# Patient Record
Sex: MALE | Race: BLACK OR AFRICAN AMERICAN | Employment: UNEMPLOYED | URBAN - METROPOLITAN AREA
[De-identification: names, ages, dates, MRNs, and addresses within clinical notes are randomized per-mention and may not be internally consistent; named-entity substitution may affect disease eponyms.]

---

## 2021-01-01 ENCOUNTER — APPOINTMENT (OUTPATIENT)
Dept: GENERAL RADIOLOGY | Age: 66
DRG: 252 | End: 2021-01-01
Attending: INTERNAL MEDICINE
Payer: MEDICARE

## 2021-01-01 ENCOUNTER — ANESTHESIA (OUTPATIENT)
Dept: SURGERY | Age: 66
DRG: 252 | End: 2021-01-01
Payer: MEDICARE

## 2021-01-01 ENCOUNTER — ANESTHESIA EVENT (OUTPATIENT)
Dept: SURGERY | Age: 66
DRG: 252 | End: 2021-01-01
Payer: MEDICARE

## 2021-01-01 ENCOUNTER — APPOINTMENT (OUTPATIENT)
Dept: NON INVASIVE DIAGNOSTICS | Age: 66
DRG: 871 | End: 2021-01-01
Attending: NURSE PRACTITIONER
Payer: MEDICARE

## 2021-01-01 ENCOUNTER — APPOINTMENT (OUTPATIENT)
Dept: CT IMAGING | Age: 66
DRG: 252 | End: 2021-01-01
Attending: EMERGENCY MEDICINE
Payer: MEDICARE

## 2021-01-01 ENCOUNTER — HOSPITAL ENCOUNTER (INPATIENT)
Age: 66
LOS: 5 days | Discharge: NURSING, REHAB OR LONG-TERM CARE | DRG: 252 | End: 2021-11-12
Attending: EMERGENCY MEDICINE | Admitting: INTERNAL MEDICINE
Payer: MEDICARE

## 2021-01-01 ENCOUNTER — APPOINTMENT (OUTPATIENT)
Dept: GENERAL RADIOLOGY | Age: 66
DRG: 252 | End: 2021-01-01
Attending: EMERGENCY MEDICINE
Payer: MEDICARE

## 2021-01-01 ENCOUNTER — APPOINTMENT (OUTPATIENT)
Dept: CT IMAGING | Age: 66
DRG: 871 | End: 2021-01-01
Attending: INTERNAL MEDICINE
Payer: MEDICARE

## 2021-01-01 ENCOUNTER — APPOINTMENT (OUTPATIENT)
Dept: GENERAL RADIOLOGY | Age: 66
DRG: 871 | End: 2021-01-01
Attending: INTERNAL MEDICINE
Payer: MEDICARE

## 2021-01-01 ENCOUNTER — APPOINTMENT (OUTPATIENT)
Dept: NON INVASIVE DIAGNOSTICS | Age: 66
DRG: 252 | End: 2021-01-01
Attending: INTERNAL MEDICINE
Payer: MEDICARE

## 2021-01-01 ENCOUNTER — HOSPITAL ENCOUNTER (INPATIENT)
Age: 66
LOS: 2 days | DRG: 871 | End: 2021-12-12
Attending: STUDENT IN AN ORGANIZED HEALTH CARE EDUCATION/TRAINING PROGRAM | Admitting: INTERNAL MEDICINE
Payer: MEDICARE

## 2021-01-01 ENCOUNTER — APPOINTMENT (OUTPATIENT)
Dept: GENERAL RADIOLOGY | Age: 66
DRG: 252 | End: 2021-01-01
Attending: ANESTHESIOLOGY
Payer: MEDICARE

## 2021-01-01 ENCOUNTER — APPOINTMENT (OUTPATIENT)
Dept: CT IMAGING | Age: 66
DRG: 252 | End: 2021-01-01
Attending: INTERNAL MEDICINE
Payer: MEDICARE

## 2021-01-01 ENCOUNTER — APPOINTMENT (OUTPATIENT)
Dept: GENERAL RADIOLOGY | Age: 66
DRG: 871 | End: 2021-01-01
Attending: EMERGENCY MEDICINE
Payer: MEDICARE

## 2021-01-01 ENCOUNTER — APPOINTMENT (OUTPATIENT)
Dept: VASCULAR SURGERY | Age: 66
DRG: 252 | End: 2021-01-01
Attending: EMERGENCY MEDICINE
Payer: MEDICARE

## 2021-01-01 ENCOUNTER — APPOINTMENT (OUTPATIENT)
Dept: CT IMAGING | Age: 66
DRG: 871 | End: 2021-01-01
Attending: EMERGENCY MEDICINE
Payer: MEDICARE

## 2021-01-01 VITALS
OXYGEN SATURATION: 100 % | HEIGHT: 69 IN | BODY MASS INDEX: 25.18 KG/M2 | RESPIRATION RATE: 16 BRPM | WEIGHT: 170 LBS | HEART RATE: 110 BPM | TEMPERATURE: 98.8 F | SYSTOLIC BLOOD PRESSURE: 139 MMHG | DIASTOLIC BLOOD PRESSURE: 56 MMHG

## 2021-01-01 VITALS
WEIGHT: 170 LBS | SYSTOLIC BLOOD PRESSURE: 39 MMHG | HEART RATE: 65 BPM | HEIGHT: 66 IN | OXYGEN SATURATION: 63 % | DIASTOLIC BLOOD PRESSURE: 17 MMHG | RESPIRATION RATE: 12 BRPM | BODY MASS INDEX: 27.32 KG/M2 | TEMPERATURE: 95.5 F

## 2021-01-01 DIAGNOSIS — A41.9 SEVERE SEPSIS (HCC): Primary | ICD-10-CM

## 2021-01-01 DIAGNOSIS — I21.4 NSTEMI (NON-ST ELEVATED MYOCARDIAL INFARCTION) (HCC): ICD-10-CM

## 2021-01-01 DIAGNOSIS — R65.20 SEVERE SEPSIS (HCC): Primary | ICD-10-CM

## 2021-01-01 DIAGNOSIS — E87.5 ACUTE HYPERKALEMIA: ICD-10-CM

## 2021-01-01 DIAGNOSIS — N28.9 ACUTE RENAL INSUFFICIENCY: ICD-10-CM

## 2021-01-01 DIAGNOSIS — I70.0 AORTIC OCCLUSION (HCC): Primary | ICD-10-CM

## 2021-01-01 LAB
ABO + RH BLD: NORMAL
ALBUMIN SERPL-MCNC: 1.8 G/DL (ref 3.5–5)
ALBUMIN SERPL-MCNC: 1.9 G/DL (ref 3.5–5)
ALBUMIN SERPL-MCNC: 1.9 G/DL (ref 3.5–5)
ALBUMIN SERPL-MCNC: 2.1 G/DL (ref 3.5–5)
ALBUMIN SERPL-MCNC: 2.6 G/DL (ref 3.5–5)
ALBUMIN SERPL-MCNC: 3.1 G/DL (ref 3.5–5)
ALBUMIN/GLOB SERPL: 0.5 {RATIO} (ref 1.1–2.2)
ALBUMIN/GLOB SERPL: 0.6 {RATIO} (ref 1.1–2.2)
ALBUMIN/GLOB SERPL: 0.6 {RATIO} (ref 1.1–2.2)
ALP SERPL-CCNC: 369 U/L (ref 45–117)
ALP SERPL-CCNC: 45 U/L (ref 45–117)
ALP SERPL-CCNC: 47 U/L (ref 45–117)
ALP SERPL-CCNC: 497 U/L (ref 45–117)
ALP SERPL-CCNC: 81 U/L (ref 45–117)
ALT SERPL-CCNC: 10 U/L (ref 12–78)
ALT SERPL-CCNC: 18 U/L (ref 12–78)
ALT SERPL-CCNC: 721 U/L (ref 12–78)
ALT SERPL-CCNC: 8 U/L (ref 12–78)
ALT SERPL-CCNC: 971 U/L (ref 12–78)
AMMONIA PLAS-SCNC: 12 UMOL/L
AMPHET UR QL SCN: NEGATIVE
ANION GAP SERPL CALC-SCNC: 10 MMOL/L (ref 5–15)
ANION GAP SERPL CALC-SCNC: 10 MMOL/L (ref 5–15)
ANION GAP SERPL CALC-SCNC: 20 MMOL/L (ref 5–15)
ANION GAP SERPL CALC-SCNC: 22 MMOL/L (ref 5–15)
ANION GAP SERPL CALC-SCNC: 23 MMOL/L (ref 5–15)
ANION GAP SERPL CALC-SCNC: 24 MMOL/L (ref 5–15)
ANION GAP SERPL CALC-SCNC: 25 MMOL/L (ref 5–15)
ANION GAP SERPL CALC-SCNC: 26 MMOL/L (ref 5–15)
ANION GAP SERPL CALC-SCNC: 8 MMOL/L (ref 5–15)
ANION GAP SERPL CALC-SCNC: 8 MMOL/L (ref 5–15)
ANION GAP SERPL CALC-SCNC: 9 MMOL/L (ref 5–15)
ANION GAP SERPL CALC-SCNC: 9 MMOL/L (ref 5–15)
APPEARANCE UR: ABNORMAL
APTT PPP: 26.2 SEC (ref 22.1–31)
APTT PPP: 32.4 SEC (ref 22.1–31)
APTT PPP: 50.3 SEC (ref 22.1–31)
APTT PPP: 52.5 SEC (ref 21.2–34.1)
APTT PPP: 79.4 SEC (ref 22.1–31)
APTT PPP: >130 SEC (ref 22.1–31)
ARTERIAL PATENCY WRIST A: ABNORMAL
AST SERPL W P-5'-P-CCNC: 1170 U/L (ref 15–37)
AST SERPL W P-5'-P-CCNC: 1542 U/L (ref 15–37)
AST SERPL-CCNC: 37 U/L (ref 15–37)
AST SERPL-CCNC: 39 U/L (ref 15–37)
AST SERPL-CCNC: 41 U/L (ref 15–37)
ATRIAL RATE: 92 BPM
AV R PG: 44.22 MMHG
BACTERIA SPEC CULT: NORMAL
BACTERIA SPEC CULT: NORMAL
BACTERIA URNS QL MICRO: NEGATIVE /HPF
BARBITURATES UR QL SCN: NEGATIVE
BASE DEFICIT BLDA-SCNC: 16.6 MMOL/L (ref 0–2)
BASE DEFICIT BLDA-SCNC: 6.9 MMOL/L (ref 0–2)
BASE DEFICIT BLDA-SCNC: 9.1 MMOL/L (ref 0–2)
BASOPHILS # BLD: 0 K/UL (ref 0–0.1)
BASOPHILS # BLD: 0.1 K/UL (ref 0–0.1)
BASOPHILS # BLD: 0.1 K/UL (ref 0–0.1)
BASOPHILS NFR BLD: 0 % (ref 0–1)
BASOPHILS NFR BLD: 1 % (ref 0–1)
BASOPHILS NFR BLD: 1 % (ref 0–1)
BDY SITE: ABNORMAL
BENZODIAZ UR QL: NEGATIVE
BILIRUB DIRECT SERPL-MCNC: 0.2 MG/DL (ref 0–0.2)
BILIRUB DIRECT SERPL-MCNC: 0.2 MG/DL (ref 0–0.2)
BILIRUB DIRECT SERPL-MCNC: 1.5 MG/DL (ref 0–0.2)
BILIRUB SERPL-MCNC: 0.4 MG/DL (ref 0.2–1)
BILIRUB SERPL-MCNC: 0.5 MG/DL (ref 0.2–1)
BILIRUB SERPL-MCNC: 0.6 MG/DL (ref 0.2–1)
BILIRUB SERPL-MCNC: 2.2 MG/DL (ref 0.2–1)
BILIRUB SERPL-MCNC: 2.4 MG/DL (ref 0.2–1)
BILIRUB UR QL: NEGATIVE
BLOOD GROUP ANTIBODIES SERPL: NORMAL
BNP SERPL-MCNC: 1218 PG/ML
BUN SERPL-MCNC: 10 MG/DL (ref 6–20)
BUN SERPL-MCNC: 100 MG/DL (ref 6–20)
BUN SERPL-MCNC: 107 MG/DL (ref 6–20)
BUN SERPL-MCNC: 11 MG/DL (ref 6–20)
BUN SERPL-MCNC: 111 MG/DL (ref 6–20)
BUN SERPL-MCNC: 7 MG/DL (ref 6–20)
BUN SERPL-MCNC: 8 MG/DL (ref 6–20)
BUN SERPL-MCNC: 9 MG/DL (ref 6–20)
BUN SERPL-MCNC: 9 MG/DL (ref 6–20)
BUN SERPL-MCNC: 90 MG/DL (ref 6–20)
BUN SERPL-MCNC: 92 MG/DL (ref 6–20)
BUN SERPL-MCNC: 93 MG/DL (ref 6–20)
BUN/CREAT SERPL: 12 (ref 12–20)
BUN/CREAT SERPL: 13 (ref 12–20)
BUN/CREAT SERPL: 16 (ref 12–20)
BUN/CREAT SERPL: 25 (ref 12–20)
BUN/CREAT SERPL: 27 (ref 12–20)
BUN/CREAT SERPL: 29 (ref 12–20)
BUN/CREAT SERPL: 31 (ref 12–20)
BUN/CREAT SERPL: 31 (ref 12–20)
BUN/CREAT SERPL: 32 (ref 12–20)
BUN/CREAT SERPL: 9 (ref 12–20)
CA-I BLD-MCNC: 6 MG/DL (ref 8.5–10.1)
CA-I BLD-MCNC: 6.8 MG/DL (ref 8.5–10.1)
CA-I BLD-MCNC: 7 MG/DL (ref 8.5–10.1)
CA-I BLD-MCNC: 7.1 MG/DL (ref 8.5–10.1)
CA-I BLD-MCNC: 8.2 MG/DL (ref 8.5–10.1)
CA-I BLD-MCNC: 8.6 MG/DL (ref 8.5–10.1)
CALCIUM SERPL-MCNC: 10.1 MG/DL (ref 8.5–10.1)
CALCIUM SERPL-MCNC: 8.1 MG/DL (ref 8.5–10.1)
CALCIUM SERPL-MCNC: 8.1 MG/DL (ref 8.5–10.1)
CALCIUM SERPL-MCNC: 8.5 MG/DL (ref 8.5–10.1)
CALCIUM SERPL-MCNC: 9.2 MG/DL (ref 8.5–10.1)
CALCIUM SERPL-MCNC: 9.4 MG/DL (ref 8.5–10.1)
CALCULATED P AXIS, ECG09: 62 DEGREES
CALCULATED R AXIS, ECG10: -82 DEGREES
CALCULATED T AXIS, ECG11: 91 DEGREES
CANNABINOIDS UR QL SCN: POSITIVE
CHLORIDE SERPL-SCNC: 100 MMOL/L (ref 97–108)
CHLORIDE SERPL-SCNC: 92 MMOL/L (ref 97–108)
CHLORIDE SERPL-SCNC: 94 MMOL/L (ref 97–108)
CHLORIDE SERPL-SCNC: 94 MMOL/L (ref 97–108)
CHLORIDE SERPL-SCNC: 95 MMOL/L (ref 97–108)
CHLORIDE SERPL-SCNC: 96 MMOL/L (ref 97–108)
CHLORIDE SERPL-SCNC: 97 MMOL/L (ref 97–108)
CHOLEST SERPL-MCNC: 165 MG/DL
CO2 SERPL-SCNC: 10 MMOL/L (ref 21–32)
CO2 SERPL-SCNC: 10 MMOL/L (ref 21–32)
CO2 SERPL-SCNC: 12 MMOL/L (ref 21–32)
CO2 SERPL-SCNC: 14 MMOL/L (ref 21–32)
CO2 SERPL-SCNC: 15 MMOL/L (ref 21–32)
CO2 SERPL-SCNC: 16 MMOL/L (ref 21–32)
CO2 SERPL-SCNC: 23 MMOL/L (ref 21–32)
CO2 SERPL-SCNC: 24 MMOL/L (ref 21–32)
CO2 SERPL-SCNC: 26 MMOL/L (ref 21–32)
CO2 SERPL-SCNC: 28 MMOL/L (ref 21–32)
COCAINE UR QL SCN: POSITIVE
COLLECT DATE STL: 2
COLOR UR: ABNORMAL
COVID-19 RAPID TEST, COVR: NOT DETECTED
CREAT SERPL-MCNC: 0.57 MG/DL (ref 0.7–1.3)
CREAT SERPL-MCNC: 0.66 MG/DL (ref 0.7–1.3)
CREAT SERPL-MCNC: 0.76 MG/DL (ref 0.7–1.3)
CREAT SERPL-MCNC: 0.8 MG/DL (ref 0.7–1.3)
CREAT SERPL-MCNC: 0.81 MG/DL (ref 0.7–1.3)
CREAT SERPL-MCNC: 0.95 MG/DL (ref 0.7–1.3)
CREAT SERPL-MCNC: 2.91 MG/DL (ref 0.7–1.3)
CREAT SERPL-MCNC: 2.94 MG/DL (ref 0.7–1.3)
CREAT SERPL-MCNC: 2.98 MG/DL (ref 0.7–1.3)
CREAT SERPL-MCNC: 3.43 MG/DL (ref 0.7–1.3)
CREAT SERPL-MCNC: 3.93 MG/DL (ref 0.7–1.3)
CREAT SERPL-MCNC: 4.47 MG/DL (ref 0.7–1.3)
DIAGNOSIS, 93000: NORMAL
DIFFERENTIAL METHOD BLD: ABNORMAL
DRUG SCRN COMMENT,DRGCM: ABNORMAL
ECHO AO ASC DIAM: 2.68 CM
ECHO AO ROOT DIAM: 2.9 CM
ECHO AR MAX VEL PISA: 332.5 CM/S
ECHO AR MAX VEL PISA: 374 CM/S
ECHO AV AREA PEAK VELOCITY: 3.16 CM2
ECHO AV AREA VTI: 3.29 CM2
ECHO AV AREA/BSA PEAK VELOCITY: 1.6 CM2/M2
ECHO AV AREA/BSA VTI: 1.7 CM2/M2
ECHO AV MEAN GRADIENT: 2.59 MMHG
ECHO AV PEAK GRADIENT: 4.71 MMHG
ECHO AV PEAK VELOCITY: 108.43 CM/S
ECHO AV REGURGITANT PHT: 279.1 MS
ECHO AV REGURGITANT PHT: 361 MS
ECHO AV VTI: 14.48 CM
ECHO EST RA PRESSURE: 8 MMHG
ECHO LA MAJOR AXIS: 3.74 CM
ECHO LA MAJOR AXIS: 4.5 CM
ECHO LA MAJOR AXIS: 4.5 CM
ECHO LA MINOR AXIS: 1.94 CM
ECHO LV E' LATERAL VELOCITY: 3.05 CM/S
ECHO LV E' SEPTAL VELOCITY: 9.86 CM/S
ECHO LV EDV A2C: 187 CM3
ECHO LV ESV A2C: 133 CM3
ECHO LV INTERNAL DIMENSION DIASTOLIC: 5.72 CM (ref 4.2–5.9)
ECHO LV INTERNAL DIMENSION DIASTOLIC: 6.24 CM (ref 4.2–5.9)
ECHO LV INTERNAL DIMENSION SYSTOLIC: 5.1 CM
ECHO LV INTERNAL DIMENSION SYSTOLIC: 5.46 CM
ECHO LV IVSD: 0.89 CM (ref 0.6–1)
ECHO LV IVSD: 1.28 CM (ref 0.6–1)
ECHO LV MASS 2D: 235.7 G (ref 88–224)
ECHO LV MASS 2D: 305.3 G (ref 88–224)
ECHO LV MASS INDEX 2D: 122.1 G/M2 (ref 49–115)
ECHO LV MASS INDEX 2D: 163.3 G/M2 (ref 49–115)
ECHO LV POSTERIOR WALL DIASTOLIC: 0.94 CM (ref 0.6–1)
ECHO LV POSTERIOR WALL DIASTOLIC: 1.21 CM (ref 0.6–1)
ECHO LVOT DIAM: 1.96 CM
ECHO LVOT PEAK GRADIENT: 5.11 MMHG
ECHO LVOT PEAK VELOCITY: 112.98 CM/S
ECHO LVOT SV: 47.6 ML
ECHO LVOT VTI: 15.72 CM
ECHO MV A VELOCITY: 97.08 CM/S
ECHO MV AREA PHT: 14.37 CM2
ECHO MV AREA VTI: 4.56 CM2
ECHO MV E DECELERATION TIME (DT): 74.88 MS
ECHO MV E VELOCITY: 41.11 CM/S
ECHO MV E/A RATIO: 0.42
ECHO MV E/E' LATERAL: 13.48
ECHO MV E/E' RATIO (AVERAGED): 8.82
ECHO MV E/E' SEPTAL: 4.17
ECHO MV MAX VELOCITY: 82.72 CM/S
ECHO MV MEAN GRADIENT: 1.1 MMHG
ECHO MV PEAK GRADIENT: 2.74 MMHG
ECHO MV PRESSURE HALF TIME (PHT): 15.31 MS
ECHO MV REGURGITANT VTIA: 151.61 CM
ECHO MV VTI: 10.44 CM
ECHO RA AREA 4C: 22.21 CM2
ECHO RIGHT VENTRICULAR SYSTOLIC PRESSURE (RVSP): 53 MMHG
ECHO RV INTERNAL DIMENSION: 4.26 CM
ECHO TV MAX VELOCITY: 334 CM/S
ECHO TV MEAN GRADIENT: 65.5 MMHG
ECHO TV REGURGITANT MAX VELOCITY: 141.26 CM/S
ECHO TV REGURGITANT MAX VELOCITY: 494.34 CM/S
ECHO TV REGURGITANT MAX VELOCITY: 510.85 CM/S
ECHO TV REGURGITANT PEAK GRADIENT: 45 MMHG
ECHO TV REGURGITANT PEAK GRADIENT: 7.98 MMHG
EOSINOPHIL # BLD: 0 K/UL (ref 0–0.4)
EOSINOPHIL # BLD: 0.1 K/UL (ref 0–0.4)
EOSINOPHIL NFR BLD: 0 % (ref 0–7)
EOSINOPHIL NFR BLD: 1 % (ref 0–7)
EOSINOPHIL NFR BLD: 2 % (ref 0–7)
EPAP/CPAP/PEEP, PAPEEP: 0
EPAP/CPAP/PEEP, PAPEEP: 6
ERYTHROCYTE [DISTWIDTH] IN BLOOD BY AUTOMATED COUNT: 12.9 % (ref 11.5–14.5)
ERYTHROCYTE [DISTWIDTH] IN BLOOD BY AUTOMATED COUNT: 13 % (ref 11.5–14.5)
ERYTHROCYTE [DISTWIDTH] IN BLOOD BY AUTOMATED COUNT: 13.1 % (ref 11.5–14.5)
ERYTHROCYTE [DISTWIDTH] IN BLOOD BY AUTOMATED COUNT: 13.2 % (ref 11.5–14.5)
ERYTHROCYTE [DISTWIDTH] IN BLOOD BY AUTOMATED COUNT: 15.7 % (ref 11.5–14.5)
ERYTHROCYTE [DISTWIDTH] IN BLOOD BY AUTOMATED COUNT: 15.9 % (ref 11.5–14.5)
ERYTHROCYTE [DISTWIDTH] IN BLOOD BY AUTOMATED COUNT: 16 % (ref 11.5–14.5)
EST. AVERAGE GLUCOSE BLD GHB EST-MCNC: 157 MG/DL
FIO2 ON VENT: 100 %
FIO2 ON VENT: 100 %
GAS FLOW.O2 O2 DELIVERY SYS: 15 L/MIN
GAS FLOW.O2 O2 DELIVERY SYS: 6 L/MIN
GAS FLOW.O2 SETTING OXYMISER: 14 L/MIN
GLOBULIN SER CALC-MCNC: 3.5 G/DL (ref 2–4)
GLOBULIN SER CALC-MCNC: 3.7 G/DL (ref 2–4)
GLOBULIN SER CALC-MCNC: 3.8 G/DL (ref 2–4)
GLOBULIN SER CALC-MCNC: 4.6 G/DL (ref 2–4)
GLOBULIN SER CALC-MCNC: 5.8 G/DL (ref 2–4)
GLUCOSE BLD STRIP.AUTO-MCNC: 108 MG/DL (ref 65–117)
GLUCOSE BLD STRIP.AUTO-MCNC: 126 MG/DL (ref 65–117)
GLUCOSE BLD STRIP.AUTO-MCNC: 138 MG/DL (ref 65–117)
GLUCOSE BLD STRIP.AUTO-MCNC: 149 MG/DL (ref 65–117)
GLUCOSE BLD STRIP.AUTO-MCNC: 154 MG/DL (ref 65–117)
GLUCOSE BLD STRIP.AUTO-MCNC: 158 MG/DL (ref 65–117)
GLUCOSE BLD STRIP.AUTO-MCNC: 166 MG/DL (ref 65–117)
GLUCOSE BLD STRIP.AUTO-MCNC: 170 MG/DL (ref 65–117)
GLUCOSE BLD STRIP.AUTO-MCNC: 176 MG/DL (ref 65–117)
GLUCOSE BLD STRIP.AUTO-MCNC: 177 MG/DL (ref 65–117)
GLUCOSE BLD STRIP.AUTO-MCNC: 181 MG/DL (ref 65–117)
GLUCOSE BLD STRIP.AUTO-MCNC: 182 MG/DL (ref 65–117)
GLUCOSE BLD STRIP.AUTO-MCNC: 188 MG/DL (ref 65–117)
GLUCOSE BLD STRIP.AUTO-MCNC: 189 MG/DL (ref 65–117)
GLUCOSE BLD STRIP.AUTO-MCNC: 191 MG/DL (ref 65–117)
GLUCOSE BLD STRIP.AUTO-MCNC: 194 MG/DL (ref 65–117)
GLUCOSE BLD STRIP.AUTO-MCNC: 197 MG/DL (ref 65–117)
GLUCOSE BLD STRIP.AUTO-MCNC: 211 MG/DL (ref 65–117)
GLUCOSE BLD STRIP.AUTO-MCNC: 213 MG/DL (ref 65–117)
GLUCOSE BLD STRIP.AUTO-MCNC: 266 MG/DL (ref 65–117)
GLUCOSE BLD STRIP.AUTO-MCNC: 290 MG/DL (ref 65–117)
GLUCOSE BLD STRIP.AUTO-MCNC: 292 MG/DL (ref 65–117)
GLUCOSE BLD STRIP.AUTO-MCNC: 80 MG/DL (ref 65–117)
GLUCOSE BLD STRIP.AUTO-MCNC: 88 MG/DL (ref 65–117)
GLUCOSE BLD STRIP.AUTO-MCNC: 93 MG/DL (ref 65–117)
GLUCOSE SERPL-MCNC: 107 MG/DL (ref 65–100)
GLUCOSE SERPL-MCNC: 122 MG/DL (ref 65–100)
GLUCOSE SERPL-MCNC: 144 MG/DL (ref 65–100)
GLUCOSE SERPL-MCNC: 145 MG/DL (ref 65–100)
GLUCOSE SERPL-MCNC: 146 MG/DL (ref 65–100)
GLUCOSE SERPL-MCNC: 148 MG/DL (ref 65–100)
GLUCOSE SERPL-MCNC: 150 MG/DL (ref 65–100)
GLUCOSE SERPL-MCNC: 164 MG/DL (ref 65–100)
GLUCOSE SERPL-MCNC: 173 MG/DL (ref 65–100)
GLUCOSE SERPL-MCNC: 245 MG/DL (ref 65–100)
GLUCOSE SERPL-MCNC: 298 MG/DL (ref 65–100)
GLUCOSE SERPL-MCNC: 299 MG/DL (ref 65–100)
GLUCOSE UR STRIP.AUTO-MCNC: 50 MG/DL
HBA1C MFR BLD: 7.1 % (ref 4–5.6)
HCO3 BLDA-SCNC: 12 MMOL/L (ref 22–26)
HCO3 BLDA-SCNC: 17 MMOL/L (ref 22–26)
HCO3 BLDA-SCNC: 17 MMOL/L (ref 22–26)
HCT VFR BLD AUTO: 24 % (ref 36.6–50.3)
HCT VFR BLD AUTO: 24.5 % (ref 36.6–50.3)
HCT VFR BLD AUTO: 25.6 % (ref 36.6–50.3)
HCT VFR BLD AUTO: 26.5 % (ref 36.6–50.3)
HCT VFR BLD AUTO: 26.5 % (ref 36.6–50.3)
HCT VFR BLD AUTO: 29.3 % (ref 36.6–50.3)
HCT VFR BLD AUTO: 33.2 % (ref 36.6–50.3)
HCT VFR BLD AUTO: 38.8 % (ref 36.6–50.3)
HCT VFR BLD AUTO: 39.5 % (ref 36.6–50.3)
HCT VFR BLD AUTO: 42.4 % (ref 36.6–50.3)
HDLC SERPL-MCNC: 37 MG/DL
HDLC SERPL: 4.5 {RATIO} (ref 0–5)
HEMOCCULT SP1 STL QL: POSITIVE
HGB BLD-MCNC: 10.6 G/DL (ref 12.1–17)
HGB BLD-MCNC: 13.1 G/DL (ref 12.1–17)
HGB BLD-MCNC: 13.1 G/DL (ref 12.1–17)
HGB BLD-MCNC: 14.3 G/DL (ref 12.1–17)
HGB BLD-MCNC: 8.2 G/DL (ref 12.1–17)
HGB BLD-MCNC: 8.7 G/DL (ref 12.1–17)
HGB BLD-MCNC: 8.7 G/DL (ref 12.1–17)
HGB BLD-MCNC: 9.4 G/DL (ref 12.1–17)
HGB UR QL STRIP: ABNORMAL
HYALINE CASTS URNS QL MICRO: ABNORMAL /LPF (ref 0–5)
IMM GRANULOCYTES # BLD AUTO: 0 K/UL
IMM GRANULOCYTES # BLD AUTO: 0 K/UL (ref 0–0.04)
IMM GRANULOCYTES # BLD AUTO: 0 K/UL (ref 0–0.04)
IMM GRANULOCYTES # BLD AUTO: 0.1 K/UL (ref 0–0.04)
IMM GRANULOCYTES # BLD AUTO: 0.1 K/UL (ref 0–0.04)
IMM GRANULOCYTES NFR BLD AUTO: 0 %
IMM GRANULOCYTES NFR BLD AUTO: 0 % (ref 0–0.5)
IMM GRANULOCYTES NFR BLD AUTO: 1 % (ref 0–0.5)
IPAP/PIP, IPAPIP: 0
KETONES UR QL STRIP.AUTO: 5 MG/DL
LACTATE SERPL-SCNC: 11.6 MMOL/L (ref 0.4–2)
LACTATE SERPL-SCNC: 12.3 MMOL/L (ref 0.4–2)
LACTATE SERPL-SCNC: 12.5 MMOL/L (ref 0.4–2)
LDLC SERPL CALC-MCNC: 110.8 MG/DL (ref 0–100)
LEFT ARM BP: 140 MMHG
LEUKOCYTE ESTERASE UR QL STRIP.AUTO: NEGATIVE
LVOT MG: 2.14 MMHG
LYMPHOCYTES # BLD: 1.2 K/UL (ref 0.8–3.5)
LYMPHOCYTES # BLD: 1.4 K/UL (ref 0.8–3.5)
LYMPHOCYTES # BLD: 1.6 K/UL (ref 0.8–3.5)
LYMPHOCYTES # BLD: 1.8 K/UL (ref 0.8–3.5)
LYMPHOCYTES # BLD: 2.3 K/UL (ref 0.8–3.5)
LYMPHOCYTES # BLD: 2.5 K/UL (ref 0.8–3.5)
LYMPHOCYTES # BLD: 2.7 K/UL (ref 0.8–3.5)
LYMPHOCYTES NFR BLD: 11 % (ref 12–49)
LYMPHOCYTES NFR BLD: 11 % (ref 12–49)
LYMPHOCYTES NFR BLD: 12 % (ref 12–49)
LYMPHOCYTES NFR BLD: 13 % (ref 12–49)
LYMPHOCYTES NFR BLD: 20 % (ref 12–49)
LYMPHOCYTES NFR BLD: 33 % (ref 12–49)
LYMPHOCYTES NFR BLD: 7 % (ref 12–49)
MAGNESIUM SERPL-MCNC: 1.2 MG/DL (ref 1.6–2.4)
MAGNESIUM SERPL-MCNC: 1.3 MG/DL (ref 1.6–2.4)
MAGNESIUM SERPL-MCNC: 2.3 MG/DL (ref 1.6–2.4)
MAGNESIUM SERPL-MCNC: 2.3 MG/DL (ref 1.6–2.4)
MCH RBC QN AUTO: 28.7 PG (ref 26–34)
MCH RBC QN AUTO: 29.2 PG (ref 26–34)
MCH RBC QN AUTO: 29.2 PG (ref 26–34)
MCH RBC QN AUTO: 29.3 PG (ref 26–34)
MCH RBC QN AUTO: 29.4 PG (ref 26–34)
MCH RBC QN AUTO: 29.5 PG (ref 26–34)
MCH RBC QN AUTO: 29.5 PG (ref 26–34)
MCH RBC QN AUTO: 29.7 PG (ref 26–34)
MCH RBC QN AUTO: 29.8 PG (ref 26–34)
MCHC RBC AUTO-ENTMCNC: 31.9 G/DL (ref 30–36.5)
MCHC RBC AUTO-ENTMCNC: 32 G/DL (ref 30–36.5)
MCHC RBC AUTO-ENTMCNC: 32.1 G/DL (ref 30–36.5)
MCHC RBC AUTO-ENTMCNC: 32.8 G/DL (ref 30–36.5)
MCHC RBC AUTO-ENTMCNC: 33.2 G/DL (ref 30–36.5)
MCHC RBC AUTO-ENTMCNC: 33.5 G/DL (ref 30–36.5)
MCHC RBC AUTO-ENTMCNC: 33.7 G/DL (ref 30–36.5)
MCHC RBC AUTO-ENTMCNC: 33.8 G/DL (ref 30–36.5)
MCHC RBC AUTO-ENTMCNC: 34.2 G/DL (ref 30–36.5)
MCV RBC AUTO: 87.3 FL (ref 80–99)
MCV RBC AUTO: 87.4 FL (ref 80–99)
MCV RBC AUTO: 87.5 FL (ref 80–99)
MCV RBC AUTO: 87.6 FL (ref 80–99)
MCV RBC AUTO: 88.2 FL (ref 80–99)
MCV RBC AUTO: 89.6 FL (ref 80–99)
MCV RBC AUTO: 90.4 FL (ref 80–99)
MCV RBC AUTO: 91.1 FL (ref 80–99)
MCV RBC AUTO: 92 FL (ref 80–99)
METAMYELOCYTES NFR BLD MANUAL: 2 %
METHADONE UR QL: NEGATIVE
MONOCYTES # BLD: 0.4 K/UL (ref 0–1)
MONOCYTES # BLD: 0.6 K/UL (ref 0–1)
MONOCYTES # BLD: 1.1 K/UL (ref 0–1)
MONOCYTES # BLD: 1.4 K/UL (ref 0–1)
MONOCYTES # BLD: 1.4 K/UL (ref 0–1)
MONOCYTES # BLD: 1.5 K/UL (ref 0–1)
MONOCYTES # BLD: 1.6 K/UL (ref 0–1)
MONOCYTES NFR BLD: 10 % (ref 5–13)
MONOCYTES NFR BLD: 14 % (ref 5–13)
MONOCYTES NFR BLD: 14 % (ref 5–13)
MONOCYTES NFR BLD: 2 % (ref 5–13)
MONOCYTES NFR BLD: 6 % (ref 5–13)
MONOCYTES NFR BLD: 7 % (ref 5–13)
MONOCYTES NFR BLD: 9 % (ref 5–13)
MRSA DNA SPEC QL NAA+PROBE: NOT DETECTED
MUCOUS THREADS URNS QL MICRO: ABNORMAL /LPF
MYELOCYTES NFR BLD MANUAL: 1 %
NEUTS SEG # BLD: 12.2 K/UL (ref 1.8–8)
NEUTS SEG # BLD: 18.6 K/UL (ref 1.8–8)
NEUTS SEG # BLD: 18.8 K/UL (ref 1.8–8)
NEUTS SEG # BLD: 19.2 K/UL (ref 1.8–8)
NEUTS SEG # BLD: 3.9 K/UL (ref 1.8–8)
NEUTS SEG # BLD: 4.4 K/UL (ref 1.8–8)
NEUTS SEG # BLD: 7.7 K/UL (ref 1.8–8)
NEUTS SEG NFR BLD: 50 % (ref 32–75)
NEUTS SEG NFR BLD: 68 % (ref 32–75)
NEUTS SEG NFR BLD: 72 % (ref 32–75)
NEUTS SEG NFR BLD: 78 % (ref 32–75)
NEUTS SEG NFR BLD: 82 % (ref 32–75)
NEUTS SEG NFR BLD: 84 % (ref 32–75)
NEUTS SEG NFR BLD: 86 % (ref 32–75)
NITRITE UR QL STRIP.AUTO: NEGATIVE
NRBC # BLD: 0 K/UL (ref 0–0.01)
NRBC # BLD: 0.3 K/UL (ref 0–0.01)
NRBC # BLD: 0.31 K/UL (ref 0–0.01)
NRBC # BLD: 0.34 K/UL (ref 0–0.01)
NRBC # BLD: 0.64 K/UL
NRBC BLD MANUAL-RTO: 3 PER 100 WBC
NRBC BLD-RTO: 0 PER 100 WBC
NRBC BLD-RTO: 1.3 PER 100 WBC
NRBC BLD-RTO: 1.4 PER 100 WBC
NRBC BLD-RTO: 1.6 PER 100 WBC
OPIATES UR QL: NEGATIVE
P-R INTERVAL, ECG05: 164 MS
PCO2 BLDA: 11 MMHG (ref 35–45)
PCO2 BLDA: 17 MMHG (ref 35–45)
PCO2 BLDA: 27 MMHG (ref 35–45)
PCP UR QL: NEGATIVE
PERFORMED BY, TECHID: ABNORMAL
PERFORMED BY, TECHID: NORMAL
PH BLDA: 7.38 [PH] (ref 7.35–7.45)
PH BLDA: 7.41 [PH] (ref 7.35–7.45)
PH BLDA: 7.49 [PH] (ref 7.35–7.45)
PH UR STRIP: 5 [PH] (ref 5–8)
PHOSPHATE SERPL-MCNC: 1.9 MG/DL (ref 2.6–4.7)
PHOSPHATE SERPL-MCNC: 2.1 MG/DL (ref 2.6–4.7)
PHOSPHATE SERPL-MCNC: 5.9 MG/DL (ref 2.6–4.7)
PLATELET # BLD AUTO: 113 K/UL (ref 150–400)
PLATELET # BLD AUTO: 113 K/UL (ref 150–400)
PLATELET # BLD AUTO: 121 K/UL (ref 150–400)
PLATELET # BLD AUTO: 163 K/UL (ref 150–400)
PLATELET # BLD AUTO: 166 K/UL (ref 150–400)
PLATELET # BLD AUTO: 195 K/UL (ref 150–400)
PLATELET # BLD AUTO: 226 K/UL (ref 150–400)
PLATELET # BLD AUTO: 235 K/UL (ref 150–400)
PLATELET # BLD AUTO: 237 K/UL (ref 150–400)
PLATELET COMMENTS,PCOM: ABNORMAL
PMV BLD AUTO: 10.6 FL (ref 8.9–12.9)
PMV BLD AUTO: 10.8 FL (ref 8.9–12.9)
PMV BLD AUTO: 11 FL (ref 8.9–12.9)
PMV BLD AUTO: 11 FL (ref 8.9–12.9)
PMV BLD AUTO: 11.2 FL (ref 8.9–12.9)
PMV BLD AUTO: 11.3 FL (ref 8.9–12.9)
PMV BLD AUTO: 13 FL (ref 8.9–12.9)
PO2 BLDA: 138 MMHG (ref 75–100)
PO2 BLDA: 214 MMHG (ref 75–100)
PO2 BLDA: 488 MMHG (ref 75–100)
POTASSIUM SERPL-SCNC: 3.3 MMOL/L (ref 3.5–5.1)
POTASSIUM SERPL-SCNC: 3.5 MMOL/L (ref 3.5–5.1)
POTASSIUM SERPL-SCNC: 3.8 MMOL/L (ref 3.5–5.1)
POTASSIUM SERPL-SCNC: 3.8 MMOL/L (ref 3.5–5.1)
POTASSIUM SERPL-SCNC: 4.2 MMOL/L (ref 3.5–5.1)
POTASSIUM SERPL-SCNC: 4.4 MMOL/L (ref 3.5–5.1)
POTASSIUM SERPL-SCNC: 5.7 MMOL/L (ref 3.5–5.1)
POTASSIUM SERPL-SCNC: 6.1 MMOL/L (ref 3.5–5.1)
POTASSIUM SERPL-SCNC: 6.4 MMOL/L (ref 3.5–5.1)
POTASSIUM SERPL-SCNC: 6.4 MMOL/L (ref 3.5–5.1)
POTASSIUM SERPL-SCNC: 6.6 MMOL/L (ref 3.5–5.1)
POTASSIUM SERPL-SCNC: 6.6 MMOL/L (ref 3.5–5.1)
PROCALCITONIN SERPL-MCNC: 4.55 NG/ML
PROCALCITONIN SERPL-MCNC: <0.05 NG/ML
PROT SERPL-MCNC: 5.3 G/DL (ref 6.4–8.2)
PROT SERPL-MCNC: 5.6 G/DL (ref 6.4–8.2)
PROT SERPL-MCNC: 5.9 G/DL (ref 6.4–8.2)
PROT SERPL-MCNC: 7.2 G/DL (ref 6.4–8.2)
PROT SERPL-MCNC: 8.9 G/DL (ref 6.4–8.2)
PROT UR STRIP-MCNC: >300 MG/DL
Q-T INTERVAL, ECG07: 442 MS
QRS DURATION, ECG06: 154 MS
QTC CALCULATION (BEZET), ECG08: 546 MS
RBC # BLD AUTO: 2.75 M/UL (ref 4.1–5.7)
RBC # BLD AUTO: 2.8 M/UL (ref 4.1–5.7)
RBC # BLD AUTO: 2.81 M/UL (ref 4.1–5.7)
RBC # BLD AUTO: 2.93 M/UL (ref 4.1–5.7)
RBC # BLD AUTO: 3.27 M/UL (ref 4.1–5.7)
RBC # BLD AUTO: 3.61 M/UL (ref 4.1–5.7)
RBC # BLD AUTO: 4.44 M/UL (ref 4.1–5.7)
RBC # BLD AUTO: 4.48 M/UL (ref 4.1–5.7)
RBC # BLD AUTO: 4.84 M/UL (ref 4.1–5.7)
RBC #/AREA URNS HPF: ABNORMAL /HPF (ref 0–5)
RBC MORPH BLD: ABNORMAL
RIGHT ABI: 0.24
RIGHT ARM BP: 143 MMHG
RIGHT POSTERIOR TIBIAL: 34 MMHG
RIGHT TBI: 0
RIGHT TOE PRESSURE: 0 MMHG
SAO2 % BLD: 101 %
SAO2 % BLD: 101 %
SAO2 % BLD: >101 %
SAO2% DEVICE SAO2% SENSOR NAME: ABNORMAL
SAO2% DEVICE SAO2% SENSOR NAME: ABNORMAL
SERVICE CMNT-IMP: ABNORMAL
SERVICE CMNT-IMP: NORMAL
SODIUM SERPL-SCNC: 130 MMOL/L (ref 136–145)
SODIUM SERPL-SCNC: 131 MMOL/L (ref 136–145)
SODIUM SERPL-SCNC: 131 MMOL/L (ref 136–145)
SODIUM SERPL-SCNC: 132 MMOL/L (ref 136–145)
SODIUM SERPL-SCNC: 134 MMOL/L (ref 136–145)
SODIUM SERPL-SCNC: 134 MMOL/L (ref 136–145)
SOURCE, COVRS: NORMAL
SOURCE, COVRS: NORMAL
SP GR UR REFRACTOMETRY: 1.02 (ref 1–1.03)
SPECIAL REQUESTS,SREQ: NORMAL
SPECIMEN EXP DATE BLD: NORMAL
SPECIMEN SOURCE: NORMAL
THERAPEUTIC RANGE,PTTT: ABNORMAL SEC (ref 82–109)
THERAPEUTIC RANGE,PTTT: ABNORMAL SECS (ref 58–77)
THERAPEUTIC RANGE,PTTT: NORMAL SECS (ref 58–77)
TRIGL SERPL-MCNC: 86 MG/DL (ref ?–150)
TROPONIN-HIGH SENSITIVITY: 1040 NG/L (ref 0–76)
TROPONIN-HIGH SENSITIVITY: 1591 NG/L (ref 0–76)
TROPONIN-HIGH SENSITIVITY: 214 NG/L (ref 0–76)
TROPONIN-HIGH SENSITIVITY: 301 NG/L (ref 0–76)
TROPONIN-HIGH SENSITIVITY: 4093 NG/L (ref 0–76)
UA: UC IF INDICATED,UAUC: ABNORMAL
UROBILINOGEN UR QL STRIP.AUTO: 2 EU/DL (ref 0.1–1)
VANCOMYCIN SERPL-MCNC: 15.7 UG/ML
VAS RIGHT DORSALIS PEDIS BP: 0 MMHG
VENTILATION MODE VENT: ABNORMAL
VENTRICULAR RATE, ECG03: 92 BPM
VLDLC SERPL CALC-MCNC: 17.2 MG/DL
VT SETTING VENT: 420 ML
WBC # BLD AUTO: 10.3 K/UL (ref 4.1–11.1)
WBC # BLD AUTO: 10.6 K/UL (ref 4.1–11.1)
WBC # BLD AUTO: 15.6 K/UL (ref 4.1–11.1)
WBC # BLD AUTO: 21.3 K/UL (ref 4.1–11.1)
WBC # BLD AUTO: 22.4 K/UL (ref 4.1–11.1)
WBC # BLD AUTO: 22.9 K/UL (ref 4.1–11.1)
WBC # BLD AUTO: 6.3 K/UL (ref 4.1–11.1)
WBC # BLD AUTO: 7.1 K/UL (ref 4.1–11.1)
WBC # BLD AUTO: 7.7 K/UL (ref 4.1–11.1)
WBC URNS QL MICRO: ABNORMAL /HPF (ref 0–4)

## 2021-01-01 PROCEDURE — 36415 COLL VENOUS BLD VENIPUNCTURE: CPT

## 2021-01-01 PROCEDURE — 51798 US URINE CAPACITY MEASURE: CPT

## 2021-01-01 PROCEDURE — 74011000250 HC RX REV CODE- 250: Performed by: INTERNAL MEDICINE

## 2021-01-01 PROCEDURE — 74011250636 HC RX REV CODE- 250/636: Performed by: STUDENT IN AN ORGANIZED HEALTH CARE EDUCATION/TRAINING PROGRAM

## 2021-01-01 PROCEDURE — 65270000029 HC RM PRIVATE

## 2021-01-01 PROCEDURE — 77030018673: Performed by: SURGERY

## 2021-01-01 PROCEDURE — 80076 HEPATIC FUNCTION PANEL: CPT

## 2021-01-01 PROCEDURE — 74011250637 HC RX REV CODE- 250/637: Performed by: INTERNAL MEDICINE

## 2021-01-01 PROCEDURE — 82962 GLUCOSE BLOOD TEST: CPT

## 2021-01-01 PROCEDURE — 73630 X-RAY EXAM OF FOOT: CPT

## 2021-01-01 PROCEDURE — 83036 HEMOGLOBIN GLYCOSYLATED A1C: CPT

## 2021-01-01 PROCEDURE — 71045 X-RAY EXAM CHEST 1 VIEW: CPT

## 2021-01-01 PROCEDURE — 83735 ASSAY OF MAGNESIUM: CPT

## 2021-01-01 PROCEDURE — 74011000250 HC RX REV CODE- 250: Performed by: SURGERY

## 2021-01-01 PROCEDURE — 85025 COMPLETE CBC W/AUTO DIFF WBC: CPT

## 2021-01-01 PROCEDURE — 74011250636 HC RX REV CODE- 250/636: Performed by: NURSE ANESTHETIST, CERTIFIED REGISTERED

## 2021-01-01 PROCEDURE — 2709999900 HC NON-CHARGEABLE SUPPLY

## 2021-01-01 PROCEDURE — 74011250636 HC RX REV CODE- 250/636: Performed by: SURGERY

## 2021-01-01 PROCEDURE — 75635 CT ANGIO ABDOMINAL ARTERIES: CPT

## 2021-01-01 PROCEDURE — 76010000134 HC OR TIME 3.5 TO 4 HR: Performed by: SURGERY

## 2021-01-01 PROCEDURE — 77030013798 HC KT TRNSDUC PRSSR EDWD -B: Performed by: ANESTHESIOLOGY

## 2021-01-01 PROCEDURE — 04UK0JZ SUPPLEMENT RIGHT FEMORAL ARTERY WITH SYNTHETIC SUBSTITUTE, OPEN APPROACH: ICD-10-PCS | Performed by: SURGERY

## 2021-01-01 PROCEDURE — 74011000636 HC RX REV CODE- 636: Performed by: EMERGENCY MEDICINE

## 2021-01-01 PROCEDURE — 74011250637 HC RX REV CODE- 250/637: Performed by: STUDENT IN AN ORGANIZED HEALTH CARE EDUCATION/TRAINING PROGRAM

## 2021-01-01 PROCEDURE — 77030008463 HC STPLR SKN PROX J&J -B: Performed by: SURGERY

## 2021-01-01 PROCEDURE — 74011250636 HC RX REV CODE- 250/636: Performed by: INTERNAL MEDICINE

## 2021-01-01 PROCEDURE — 80061 LIPID PANEL: CPT

## 2021-01-01 PROCEDURE — 97535 SELF CARE MNGMENT TRAINING: CPT

## 2021-01-01 PROCEDURE — 87641 MR-STAPH DNA AMP PROBE: CPT

## 2021-01-01 PROCEDURE — C1757 CATH, THROMBECTOMY/EMBOLECT: HCPCS | Performed by: SURGERY

## 2021-01-01 PROCEDURE — 36600 WITHDRAWAL OF ARTERIAL BLOOD: CPT

## 2021-01-01 PROCEDURE — 65620000000 HC RM CCU GENERAL

## 2021-01-01 PROCEDURE — 94762 N-INVAS EAR/PLS OXIMTRY CONT: CPT

## 2021-01-01 PROCEDURE — 80048 BASIC METABOLIC PNL TOTAL CA: CPT

## 2021-01-01 PROCEDURE — 81001 URINALYSIS AUTO W/SCOPE: CPT

## 2021-01-01 PROCEDURE — 74011636637 HC RX REV CODE- 636/637: Performed by: SURGERY

## 2021-01-01 PROCEDURE — 74011636637 HC RX REV CODE- 636/637: Performed by: INTERNAL MEDICINE

## 2021-01-01 PROCEDURE — 76210000017 HC OR PH I REC 1.5 TO 2 HR: Performed by: SURGERY

## 2021-01-01 PROCEDURE — 77010033678 HC OXYGEN DAILY

## 2021-01-01 PROCEDURE — 76060000038 HC ANESTHESIA 3.5 TO 4 HR: Performed by: SURGERY

## 2021-01-01 PROCEDURE — 74011000250 HC RX REV CODE- 250: Performed by: HOSPITALIST

## 2021-01-01 PROCEDURE — 74011250637 HC RX REV CODE- 250/637: Performed by: SURGERY

## 2021-01-01 PROCEDURE — 74011000250 HC RX REV CODE- 250

## 2021-01-01 PROCEDURE — 96365 THER/PROPH/DIAG IV INF INIT: CPT

## 2021-01-01 PROCEDURE — C1751 CATH, INF, PER/CENT/MIDLINE: HCPCS | Performed by: ANESTHESIOLOGY

## 2021-01-01 PROCEDURE — 82272 OCCULT BLD FECES 1-3 TESTS: CPT

## 2021-01-01 PROCEDURE — 85730 THROMBOPLASTIN TIME PARTIAL: CPT

## 2021-01-01 PROCEDURE — 97165 OT EVAL LOW COMPLEX 30 MIN: CPT

## 2021-01-01 PROCEDURE — 82803 BLOOD GASES ANY COMBINATION: CPT

## 2021-01-01 PROCEDURE — 96375 TX/PRO/DX INJ NEW DRUG ADDON: CPT

## 2021-01-01 PROCEDURE — 77030002924 HC SUT GORTX WLGO -B: Performed by: SURGERY

## 2021-01-01 PROCEDURE — 93321 DOPPLER ECHO F-UP/LMTD STD: CPT

## 2021-01-01 PROCEDURE — 83605 ASSAY OF LACTIC ACID: CPT

## 2021-01-01 PROCEDURE — 93005 ELECTROCARDIOGRAM TRACING: CPT

## 2021-01-01 PROCEDURE — 80053 COMPREHEN METABOLIC PANEL: CPT

## 2021-01-01 PROCEDURE — 70450 CT HEAD/BRAIN W/O DYE: CPT

## 2021-01-01 PROCEDURE — 99285 EMERGENCY DEPT VISIT HI MDM: CPT

## 2021-01-01 PROCEDURE — 87635 SARS-COV-2 COVID-19 AMP PRB: CPT

## 2021-01-01 PROCEDURE — 74011000250 HC RX REV CODE- 250: Performed by: NURSE ANESTHETIST, CERTIFIED REGISTERED

## 2021-01-01 PROCEDURE — 86901 BLOOD TYPING SEROLOGIC RH(D): CPT

## 2021-01-01 PROCEDURE — 85027 COMPLETE CBC AUTOMATED: CPT

## 2021-01-01 PROCEDURE — 74011636637 HC RX REV CODE- 636/637: Performed by: STUDENT IN AN ORGANIZED HEALTH CARE EDUCATION/TRAINING PROGRAM

## 2021-01-01 PROCEDURE — 97530 THERAPEUTIC ACTIVITIES: CPT

## 2021-01-01 PROCEDURE — 94760 N-INVAS EAR/PLS OXIMETRY 1: CPT

## 2021-01-01 PROCEDURE — 87040 BLOOD CULTURE FOR BACTERIA: CPT

## 2021-01-01 PROCEDURE — 65610000006 HC RM INTENSIVE CARE

## 2021-01-01 PROCEDURE — 2709999900 HC NON-CHARGEABLE SUPPLY: Performed by: SURGERY

## 2021-01-01 PROCEDURE — 80202 ASSAY OF VANCOMYCIN: CPT

## 2021-01-01 PROCEDURE — 77030008684 HC TU ET CUF COVD -B: Performed by: ANESTHESIOLOGY

## 2021-01-01 PROCEDURE — 84145 PROCALCITONIN (PCT): CPT

## 2021-01-01 PROCEDURE — 84484 ASSAY OF TROPONIN QUANT: CPT

## 2021-01-01 PROCEDURE — 77030038692 HC WND DEB SYS IRMX -B: Performed by: SURGERY

## 2021-01-01 PROCEDURE — 77030014008 HC SPNG HEMSTAT J&J -C: Performed by: SURGERY

## 2021-01-01 PROCEDURE — 97161 PT EVAL LOW COMPLEX 20 MIN: CPT

## 2021-01-01 PROCEDURE — 85018 HEMOGLOBIN: CPT

## 2021-01-01 PROCEDURE — 74011000258 HC RX REV CODE- 258: Performed by: INTERNAL MEDICINE

## 2021-01-01 PROCEDURE — 77030005513 HC CATH URETH FOL11 MDII -B: Performed by: SURGERY

## 2021-01-01 PROCEDURE — 84100 ASSAY OF PHOSPHORUS: CPT

## 2021-01-01 PROCEDURE — 77030019908 HC STETH ESOPH SIMS -A: Performed by: ANESTHESIOLOGY

## 2021-01-01 PROCEDURE — 83880 ASSAY OF NATRIURETIC PEPTIDE: CPT

## 2021-01-01 PROCEDURE — 80307 DRUG TEST PRSMV CHEM ANLYZR: CPT

## 2021-01-01 PROCEDURE — 77030002986 HC SUT PROL J&J -A: Performed by: SURGERY

## 2021-01-01 PROCEDURE — 77030010938 HC CLP LIG TELE -A: Performed by: SURGERY

## 2021-01-01 PROCEDURE — C1768 GRAFT, VASCULAR: HCPCS | Performed by: SURGERY

## 2021-01-01 PROCEDURE — 77030026438 HC STYL ET INTUB CARD -A: Performed by: ANESTHESIOLOGY

## 2021-01-01 PROCEDURE — 74011000250 HC RX REV CODE- 250: Performed by: STUDENT IN AN ORGANIZED HEALTH CARE EDUCATION/TRAINING PROGRAM

## 2021-01-01 PROCEDURE — 93306 TTE W/DOPPLER COMPLETE: CPT | Performed by: INTERNAL MEDICINE

## 2021-01-01 PROCEDURE — 77030005401 HC CATH RAD ARRO -A: Performed by: ANESTHESIOLOGY

## 2021-01-01 PROCEDURE — 03150J6 BYPASS RIGHT AXILLARY ARTERY TO RIGHT UPPER LEG ARTERY WITH SYNTHETIC SUBSTITUTE, OPEN APPROACH: ICD-10-PCS | Performed by: SURGERY

## 2021-01-01 PROCEDURE — 80069 RENAL FUNCTION PANEL: CPT

## 2021-01-01 PROCEDURE — 93922 UPR/L XTREMITY ART 2 LEVELS: CPT

## 2021-01-01 PROCEDURE — 77030002916 HC SUT ETHLN J&J -A: Performed by: SURGERY

## 2021-01-01 PROCEDURE — 82140 ASSAY OF AMMONIA: CPT

## 2021-01-01 PROCEDURE — 77030031139 HC SUT VCRL2 J&J -A: Performed by: SURGERY

## 2021-01-01 PROCEDURE — 87086 URINE CULTURE/COLONY COUNT: CPT

## 2021-01-01 PROCEDURE — 93306 TTE W/DOPPLER COMPLETE: CPT

## 2021-01-01 DEVICE — STRETCH VASCULAR GRAFT TW RR 8MMX100CM 70CM RINGS
Type: IMPLANTABLE DEVICE | Site: ARTERIAL | Status: FUNCTIONAL
Brand: GORE-TEX   VASCULAR GRAFT

## 2021-01-01 RX ORDER — SODIUM CHLORIDE 9 MG/ML
75 INJECTION, SOLUTION INTRAVENOUS CONTINUOUS
Status: DISCONTINUED | OUTPATIENT
Start: 2021-01-01 | End: 2021-01-01

## 2021-01-01 RX ORDER — POLYETHYLENE GLYCOL 3350 17 G/17G
17 POWDER, FOR SOLUTION ORAL DAILY PRN
Status: DISCONTINUED | OUTPATIENT
Start: 2021-01-01 | End: 2021-01-01 | Stop reason: HOSPADM

## 2021-01-01 RX ORDER — IBUPROFEN 200 MG
1 TABLET ORAL EVERY 24 HOURS
COMMUNITY

## 2021-01-01 RX ORDER — ATORVASTATIN CALCIUM 80 MG/1
80 TABLET, FILM COATED ORAL DAILY
COMMUNITY

## 2021-01-01 RX ORDER — PROPOFOL 10 MG/ML
INJECTION, EMULSION INTRAVENOUS AS NEEDED
Status: DISCONTINUED | OUTPATIENT
Start: 2021-01-01 | End: 2021-01-01 | Stop reason: HOSPADM

## 2021-01-01 RX ORDER — HEPARIN SODIUM 10000 [USP'U]/100ML
12-25 INJECTION, SOLUTION INTRAVENOUS
Status: DISCONTINUED | OUTPATIENT
Start: 2021-01-01 | End: 2021-01-01 | Stop reason: CLARIF

## 2021-01-01 RX ORDER — HEPARIN SODIUM 1000 [USP'U]/ML
INJECTION, SOLUTION INTRAVENOUS; SUBCUTANEOUS AS NEEDED
Status: DISCONTINUED | OUTPATIENT
Start: 2021-01-01 | End: 2021-01-01 | Stop reason: HOSPADM

## 2021-01-01 RX ORDER — NITROGLYCERIN 0.4 MG/1
0.4 TABLET SUBLINGUAL
COMMUNITY
End: 2021-01-01

## 2021-01-01 RX ORDER — GUAIFENESIN 100 MG/5ML
81 LIQUID (ML) ORAL DAILY
Status: DISCONTINUED | OUTPATIENT
Start: 2021-01-01 | End: 2021-01-01 | Stop reason: HOSPADM

## 2021-01-01 RX ORDER — DEXTROSE 50 % IN WATER (D50W) INTRAVENOUS SYRINGE
25 ONCE
Status: COMPLETED | OUTPATIENT
Start: 2021-01-01 | End: 2021-01-01

## 2021-01-01 RX ORDER — MIDAZOLAM HYDROCHLORIDE 1 MG/ML
INJECTION, SOLUTION INTRAMUSCULAR; INTRAVENOUS AS NEEDED
Status: DISCONTINUED | OUTPATIENT
Start: 2021-01-01 | End: 2021-01-01 | Stop reason: HOSPADM

## 2021-01-01 RX ORDER — METOPROLOL TARTRATE 25 MG/1
12.5 TABLET, FILM COATED ORAL EVERY 12 HOURS
Status: DISCONTINUED | OUTPATIENT
Start: 2021-01-01 | End: 2021-01-01

## 2021-01-01 RX ORDER — SUCCINYLCHOLINE CHLORIDE 20 MG/ML
INJECTION INTRAMUSCULAR; INTRAVENOUS AS NEEDED
Status: DISCONTINUED | OUTPATIENT
Start: 2021-01-01 | End: 2021-01-01 | Stop reason: HOSPADM

## 2021-01-01 RX ORDER — SODIUM BICARBONATE 1 MEQ/ML
50 SYRINGE (ML) INTRAVENOUS ONCE
Status: COMPLETED | OUTPATIENT
Start: 2021-01-01 | End: 2021-01-01

## 2021-01-01 RX ORDER — SODIUM CHLORIDE 0.9 % (FLUSH) 0.9 %
5-10 SYRINGE (ML) INJECTION AS NEEDED
Status: DISCONTINUED | OUTPATIENT
Start: 2021-01-01 | End: 2021-01-01 | Stop reason: HOSPADM

## 2021-01-01 RX ORDER — HEPARIN SODIUM 1000 [USP'U]/ML
40 INJECTION, SOLUTION INTRAVENOUS; SUBCUTANEOUS AS NEEDED
Status: DISCONTINUED | OUTPATIENT
Start: 2021-01-01 | End: 2021-01-01

## 2021-01-01 RX ORDER — ONDANSETRON 2 MG/ML
4 INJECTION INTRAMUSCULAR; INTRAVENOUS
Status: DISCONTINUED | OUTPATIENT
Start: 2021-01-01 | End: 2021-01-01 | Stop reason: HOSPADM

## 2021-01-01 RX ORDER — HEPARIN SODIUM 10000 [USP'U]/100ML
12-25 INJECTION, SOLUTION INTRAVENOUS
Status: DISCONTINUED | OUTPATIENT
Start: 2021-01-01 | End: 2021-01-01

## 2021-01-01 RX ORDER — NOREPINEPHRINE BITARTRATE/D5W 8 MG/250ML
.5-3 PLASTIC BAG, INJECTION (ML) INTRAVENOUS
Status: DISCONTINUED | OUTPATIENT
Start: 2021-01-01 | End: 2021-01-01

## 2021-01-01 RX ORDER — FUROSEMIDE 10 MG/ML
40 INJECTION INTRAMUSCULAR; INTRAVENOUS 2 TIMES DAILY
Status: DISCONTINUED | OUTPATIENT
Start: 2021-01-01 | End: 2021-01-01

## 2021-01-01 RX ORDER — PROPOFOL 10 MG/ML
INJECTION, EMULSION INTRAVENOUS
Status: DISCONTINUED
Start: 2021-01-01 | End: 2021-01-01 | Stop reason: HOSPADM

## 2021-01-01 RX ORDER — ATORVASTATIN CALCIUM 40 MG/1
40 TABLET, FILM COATED ORAL
Status: DISCONTINUED | OUTPATIENT
Start: 2021-01-01 | End: 2021-01-01 | Stop reason: HOSPADM

## 2021-01-01 RX ORDER — HYDROMORPHONE HYDROCHLORIDE 1 MG/ML
.2-.5 INJECTION, SOLUTION INTRAMUSCULAR; INTRAVENOUS; SUBCUTANEOUS
Status: DISCONTINUED | OUTPATIENT
Start: 2021-01-01 | End: 2021-01-01 | Stop reason: HOSPADM

## 2021-01-01 RX ORDER — MORPHINE SULFATE 2 MG/ML
2 INJECTION, SOLUTION INTRAMUSCULAR; INTRAVENOUS
Status: DISCONTINUED | OUTPATIENT
Start: 2021-01-01 | End: 2021-01-01 | Stop reason: HOSPADM

## 2021-01-01 RX ORDER — SODIUM CHLORIDE, SODIUM LACTATE, POTASSIUM CHLORIDE, CALCIUM CHLORIDE 600; 310; 30; 20 MG/100ML; MG/100ML; MG/100ML; MG/100ML
INJECTION, SOLUTION INTRAVENOUS
Status: DISCONTINUED | OUTPATIENT
Start: 2021-01-01 | End: 2021-01-01 | Stop reason: HOSPADM

## 2021-01-01 RX ORDER — LOSARTAN POTASSIUM 25 MG/1
25 TABLET ORAL DAILY
COMMUNITY
End: 2021-01-01

## 2021-01-01 RX ORDER — LABETALOL 100 MG/1
100 TABLET, FILM COATED ORAL 2 TIMES DAILY
Status: DISCONTINUED | OUTPATIENT
Start: 2021-01-01 | End: 2021-01-01

## 2021-01-01 RX ORDER — FUROSEMIDE 40 MG/1
40 TABLET ORAL DAILY
COMMUNITY
End: 2021-01-01

## 2021-01-01 RX ORDER — ASPIRIN 300 MG/1
300 SUPPOSITORY RECTAL DAILY
Status: DISCONTINUED | OUTPATIENT
Start: 2021-01-01 | End: 2021-01-01 | Stop reason: HOSPADM

## 2021-01-01 RX ORDER — HEPARIN SODIUM 1000 [USP'U]/ML
80 INJECTION, SOLUTION INTRAVENOUS; SUBCUTANEOUS AS NEEDED
Status: DISCONTINUED | OUTPATIENT
Start: 2021-01-01 | End: 2021-01-01

## 2021-01-01 RX ORDER — ISOSORBIDE MONONITRATE 30 MG/1
30 TABLET, EXTENDED RELEASE ORAL DAILY
COMMUNITY
End: 2021-01-01

## 2021-01-01 RX ORDER — LIDOCAINE HYDROCHLORIDE 20 MG/ML
INJECTION, SOLUTION EPIDURAL; INFILTRATION; INTRACAUDAL; PERINEURAL AS NEEDED
Status: DISCONTINUED | OUTPATIENT
Start: 2021-01-01 | End: 2021-01-01 | Stop reason: HOSPADM

## 2021-01-01 RX ORDER — SODIUM BICARBONATE 1 MEQ/ML
100 SYRINGE (ML) INTRAVENOUS ONCE
Status: COMPLETED | OUTPATIENT
Start: 2021-01-01 | End: 2021-01-01

## 2021-01-01 RX ORDER — ISOSORBIDE MONONITRATE 30 MG/1
30 TABLET, EXTENDED RELEASE ORAL DAILY
Status: DISCONTINUED | OUTPATIENT
Start: 2021-01-01 | End: 2021-01-01 | Stop reason: HOSPADM

## 2021-01-01 RX ORDER — CEPHALEXIN 500 MG/1
500 CAPSULE ORAL 4 TIMES DAILY
Qty: 8 CAPSULE | Refills: 0 | Status: SHIPPED
Start: 2021-01-01 | End: 2021-01-01

## 2021-01-01 RX ORDER — METOPROLOL TARTRATE 25 MG/1
25 TABLET, FILM COATED ORAL EVERY 12 HOURS
Status: DISCONTINUED | OUTPATIENT
Start: 2021-01-01 | End: 2021-01-01

## 2021-01-01 RX ORDER — METOPROLOL SUCCINATE 25 MG/1
25 TABLET, EXTENDED RELEASE ORAL DAILY
COMMUNITY
End: 2021-01-01

## 2021-01-01 RX ORDER — OXYCODONE AND ACETAMINOPHEN 5; 325 MG/1; MG/1
2 TABLET ORAL
Status: DISCONTINUED | OUTPATIENT
Start: 2021-01-01 | End: 2021-01-01 | Stop reason: HOSPADM

## 2021-01-01 RX ORDER — CALCIUM GLUCONATE 20 MG/ML
2 INJECTION, SOLUTION INTRAVENOUS ONCE
Status: COMPLETED | OUTPATIENT
Start: 2021-01-01 | End: 2021-01-01

## 2021-01-01 RX ORDER — ONDANSETRON 4 MG/1
4 TABLET, ORALLY DISINTEGRATING ORAL
Status: DISCONTINUED | OUTPATIENT
Start: 2021-01-01 | End: 2021-01-01 | Stop reason: HOSPADM

## 2021-01-01 RX ORDER — FENTANYL CITRATE 50 UG/ML
25 INJECTION, SOLUTION INTRAMUSCULAR; INTRAVENOUS
Status: DISCONTINUED | OUTPATIENT
Start: 2021-01-01 | End: 2021-01-01 | Stop reason: HOSPADM

## 2021-01-01 RX ORDER — FENTANYL CITRATE 50 UG/ML
INJECTION, SOLUTION INTRAMUSCULAR; INTRAVENOUS AS NEEDED
Status: DISCONTINUED | OUTPATIENT
Start: 2021-01-01 | End: 2021-01-01 | Stop reason: HOSPADM

## 2021-01-01 RX ORDER — HYDROMORPHONE HYDROCHLORIDE 2 MG/ML
INJECTION, SOLUTION INTRAMUSCULAR; INTRAVENOUS; SUBCUTANEOUS AS NEEDED
Status: DISCONTINUED | OUTPATIENT
Start: 2021-01-01 | End: 2021-01-01 | Stop reason: HOSPADM

## 2021-01-01 RX ORDER — FENTANYL CITRATE 50 UG/ML
50 INJECTION, SOLUTION INTRAMUSCULAR; INTRAVENOUS AS NEEDED
Status: DISCONTINUED | OUTPATIENT
Start: 2021-01-01 | End: 2021-01-01

## 2021-01-01 RX ORDER — HEPARIN SODIUM 1000 [USP'U]/ML
2000 INJECTION, SOLUTION INTRAVENOUS; SUBCUTANEOUS AS NEEDED
Status: DISCONTINUED | OUTPATIENT
Start: 2021-01-01 | End: 2021-01-01

## 2021-01-01 RX ORDER — ACETAMINOPHEN 325 MG/1
650 TABLET ORAL
Status: DISCONTINUED | OUTPATIENT
Start: 2021-01-01 | End: 2021-01-01 | Stop reason: HOSPADM

## 2021-01-01 RX ORDER — HEPARIN 100 UNIT/ML
SYRINGE INTRAVENOUS
Status: DISPENSED
Start: 2021-01-01 | End: 2021-01-01

## 2021-01-01 RX ORDER — NOREPINEPHRINE BITARTRATE/D5W 8 MG/250ML
.5-3 PLASTIC BAG, INJECTION (ML) INTRAVENOUS
Status: DISCONTINUED | OUTPATIENT
Start: 2021-01-01 | End: 2021-01-01 | Stop reason: HOSPADM

## 2021-01-01 RX ORDER — ENOXAPARIN SODIUM 100 MG/ML
1 INJECTION SUBCUTANEOUS EVERY 12 HOURS
Status: DISCONTINUED | OUTPATIENT
Start: 2021-01-01 | End: 2021-01-01

## 2021-01-01 RX ORDER — ESMOLOL HYDROCHLORIDE 10 MG/ML
INJECTION INTRAVENOUS AS NEEDED
Status: DISCONTINUED | OUTPATIENT
Start: 2021-01-01 | End: 2021-01-01 | Stop reason: HOSPADM

## 2021-01-01 RX ORDER — MAGNESIUM SULFATE HEPTAHYDRATE 40 MG/ML
2 INJECTION, SOLUTION INTRAVENOUS ONCE
Status: COMPLETED | OUTPATIENT
Start: 2021-01-01 | End: 2021-01-01

## 2021-01-01 RX ORDER — SODIUM CHLORIDE 0.9 % (FLUSH) 0.9 %
5-40 SYRINGE (ML) INJECTION EVERY 8 HOURS
Status: DISCONTINUED | OUTPATIENT
Start: 2021-01-01 | End: 2021-01-01 | Stop reason: HOSPADM

## 2021-01-01 RX ORDER — ROCURONIUM BROMIDE 10 MG/ML
INJECTION, SOLUTION INTRAVENOUS AS NEEDED
Status: DISCONTINUED | OUTPATIENT
Start: 2021-01-01 | End: 2021-01-01 | Stop reason: HOSPADM

## 2021-01-01 RX ORDER — INSULIN LISPRO 100 [IU]/ML
INJECTION, SOLUTION INTRAVENOUS; SUBCUTANEOUS
Status: DISCONTINUED | OUTPATIENT
Start: 2021-01-01 | End: 2021-01-01 | Stop reason: HOSPADM

## 2021-01-01 RX ORDER — CALCIUM GLUCONATE 20 MG/ML
1 INJECTION, SOLUTION INTRAVENOUS ONCE
Status: COMPLETED | OUTPATIENT
Start: 2021-01-01 | End: 2021-01-01

## 2021-01-01 RX ORDER — ONDANSETRON 2 MG/ML
INJECTION INTRAMUSCULAR; INTRAVENOUS AS NEEDED
Status: DISCONTINUED | OUTPATIENT
Start: 2021-01-01 | End: 2021-01-01 | Stop reason: HOSPADM

## 2021-01-01 RX ORDER — ASPIRIN 81 MG/1
81 TABLET ORAL DAILY
COMMUNITY

## 2021-01-01 RX ORDER — LORAZEPAM 2 MG/ML
1 INJECTION INTRAMUSCULAR
Status: DISCONTINUED | OUTPATIENT
Start: 2021-01-01 | End: 2021-01-01 | Stop reason: HOSPADM

## 2021-01-01 RX ORDER — LIDOCAINE HYDROCHLORIDE 10 MG/ML
0.1 INJECTION, SOLUTION EPIDURAL; INFILTRATION; INTRACAUDAL; PERINEURAL AS NEEDED
Status: DISCONTINUED | OUTPATIENT
Start: 2021-01-01 | End: 2021-01-01

## 2021-01-01 RX ORDER — HYDROCORTISONE SODIUM SUCCINATE 100 MG/2ML
100 INJECTION, POWDER, FOR SOLUTION INTRAMUSCULAR; INTRAVENOUS EVERY 8 HOURS
Status: DISCONTINUED | OUTPATIENT
Start: 2021-01-01 | End: 2021-01-01 | Stop reason: HOSPADM

## 2021-01-01 RX ORDER — NOREPINEPHRINE BITARTRATE/D5W 8 MG/250ML
PLASTIC BAG, INJECTION (ML) INTRAVENOUS
Status: COMPLETED
Start: 2021-01-01 | End: 2021-01-01

## 2021-01-01 RX ORDER — EPINEPHRINE 0.1 MG/ML
INJECTION INTRACARDIAC; INTRAVENOUS
Status: DISCONTINUED
Start: 2021-01-01 | End: 2021-01-01 | Stop reason: WASHOUT

## 2021-01-01 RX ORDER — ONDANSETRON 2 MG/ML
4 INJECTION INTRAMUSCULAR; INTRAVENOUS AS NEEDED
Status: DISCONTINUED | OUTPATIENT
Start: 2021-01-01 | End: 2021-01-01 | Stop reason: HOSPADM

## 2021-01-01 RX ORDER — LORAZEPAM 2 MG/ML
2 INJECTION INTRAMUSCULAR
Status: DISCONTINUED | OUTPATIENT
Start: 2021-01-01 | End: 2021-01-01 | Stop reason: HOSPADM

## 2021-01-01 RX ORDER — MAGNESIUM SULFATE 100 %
4 CRYSTALS MISCELLANEOUS AS NEEDED
Status: DISCONTINUED | OUTPATIENT
Start: 2021-01-01 | End: 2021-01-01 | Stop reason: HOSPADM

## 2021-01-01 RX ORDER — SPIRONOLACTONE 25 MG/1
12.5 TABLET ORAL 2 TIMES DAILY
COMMUNITY
End: 2021-01-01

## 2021-01-01 RX ORDER — ACETAMINOPHEN 650 MG/1
650 SUPPOSITORY RECTAL
Status: DISCONTINUED | OUTPATIENT
Start: 2021-01-01 | End: 2021-01-01 | Stop reason: HOSPADM

## 2021-01-01 RX ORDER — PROTAMINE SULFATE 10 MG/ML
INJECTION, SOLUTION INTRAVENOUS AS NEEDED
Status: DISCONTINUED | OUTPATIENT
Start: 2021-01-01 | End: 2021-01-01 | Stop reason: HOSPADM

## 2021-01-01 RX ORDER — LEVOFLOXACIN 5 MG/ML
750 INJECTION, SOLUTION INTRAVENOUS
Status: DISCONTINUED | OUTPATIENT
Start: 2021-01-01 | End: 2021-01-01 | Stop reason: HOSPADM

## 2021-01-01 RX ORDER — CALCIUM GLUCONATE 94 MG/ML
1 INJECTION, SOLUTION INTRAVENOUS
Status: COMPLETED | OUTPATIENT
Start: 2021-01-01 | End: 2021-01-01

## 2021-01-01 RX ORDER — METOPROLOL SUCCINATE 25 MG/1
25 TABLET, EXTENDED RELEASE ORAL DAILY
Status: DISCONTINUED | OUTPATIENT
Start: 2021-01-01 | End: 2021-01-01

## 2021-01-01 RX ORDER — CEFAZOLIN SODIUM 1 G/3ML
INJECTION, POWDER, FOR SOLUTION INTRAMUSCULAR; INTRAVENOUS AS NEEDED
Status: DISCONTINUED | OUTPATIENT
Start: 2021-01-01 | End: 2021-01-01 | Stop reason: HOSPADM

## 2021-01-01 RX ORDER — ENOXAPARIN SODIUM 100 MG/ML
30 INJECTION SUBCUTANEOUS EVERY 24 HOURS
Status: DISCONTINUED | OUTPATIENT
Start: 2021-01-01 | End: 2021-01-01

## 2021-01-01 RX ORDER — SODIUM BICARBONATE 1 MEQ/ML
SYRINGE (ML) INTRAVENOUS
Status: COMPLETED
Start: 2021-01-01 | End: 2021-01-01

## 2021-01-01 RX ORDER — DEXTROSE MONOHYDRATE AND SODIUM CHLORIDE 5; .45 G/100ML; G/100ML
75 INJECTION, SOLUTION INTRAVENOUS CONTINUOUS
Status: DISCONTINUED | OUTPATIENT
Start: 2021-01-01 | End: 2021-01-01

## 2021-01-01 RX ORDER — LOSARTAN POTASSIUM 25 MG/1
25 TABLET ORAL DAILY
Status: DISCONTINUED | OUTPATIENT
Start: 2021-01-01 | End: 2021-01-01 | Stop reason: HOSPADM

## 2021-01-01 RX ORDER — IBUPROFEN 200 MG
1 TABLET ORAL EVERY 24 HOURS
Status: DISCONTINUED | OUTPATIENT
Start: 2021-01-01 | End: 2021-01-01 | Stop reason: HOSPADM

## 2021-01-01 RX ORDER — SODIUM CHLORIDE, SODIUM LACTATE, POTASSIUM CHLORIDE, CALCIUM CHLORIDE 600; 310; 30; 20 MG/100ML; MG/100ML; MG/100ML; MG/100ML
25 INJECTION, SOLUTION INTRAVENOUS CONTINUOUS
Status: DISCONTINUED | OUTPATIENT
Start: 2021-01-01 | End: 2021-01-01

## 2021-01-01 RX ORDER — NOREPINEPHRINE BITARTRATE/D5W 8 MG/250ML
.5-16 PLASTIC BAG, INJECTION (ML) INTRAVENOUS
Status: DISCONTINUED | OUTPATIENT
Start: 2021-01-01 | End: 2021-01-01

## 2021-01-01 RX ORDER — DEXTROSE 50 % IN WATER (D50W) INTRAVENOUS SYRINGE
12.5-25 AS NEEDED
Status: DISCONTINUED | OUTPATIENT
Start: 2021-01-01 | End: 2021-01-01 | Stop reason: HOSPADM

## 2021-01-01 RX ORDER — FUROSEMIDE 40 MG/1
40 TABLET ORAL DAILY
Status: DISCONTINUED | OUTPATIENT
Start: 2021-01-01 | End: 2021-01-01

## 2021-01-01 RX ORDER — MIDAZOLAM HYDROCHLORIDE 1 MG/ML
1 INJECTION, SOLUTION INTRAMUSCULAR; INTRAVENOUS AS NEEDED
Status: DISCONTINUED | OUTPATIENT
Start: 2021-01-01 | End: 2021-01-01

## 2021-01-01 RX ORDER — HEPARIN SODIUM 10000 [USP'U]/100ML
18-36 INJECTION, SOLUTION INTRAVENOUS
Status: DISCONTINUED | OUTPATIENT
Start: 2021-01-01 | End: 2021-01-01

## 2021-01-01 RX ORDER — HEPARIN SODIUM 1000 [USP'U]/ML
4000 INJECTION, SOLUTION INTRAVENOUS; SUBCUTANEOUS AS NEEDED
Status: DISCONTINUED | OUTPATIENT
Start: 2021-01-01 | End: 2021-01-01

## 2021-01-01 RX ORDER — PHENYLEPHRINE HCL IN 0.9% NACL 0.4MG/10ML
SYRINGE (ML) INTRAVENOUS
Status: DISCONTINUED | OUTPATIENT
Start: 2021-01-01 | End: 2021-01-01 | Stop reason: HOSPADM

## 2021-01-01 RX ORDER — ENOXAPARIN SODIUM 100 MG/ML
40 INJECTION SUBCUTANEOUS EVERY 24 HOURS
Status: DISCONTINUED | OUTPATIENT
Start: 2021-01-01 | End: 2021-01-01 | Stop reason: HOSPADM

## 2021-01-01 RX ORDER — SODIUM CHLORIDE, SODIUM LACTATE, POTASSIUM CHLORIDE, CALCIUM CHLORIDE 600; 310; 30; 20 MG/100ML; MG/100ML; MG/100ML; MG/100ML
25 INJECTION, SOLUTION INTRAVENOUS CONTINUOUS
Status: DISCONTINUED | OUTPATIENT
Start: 2021-01-01 | End: 2021-01-01 | Stop reason: HOSPADM

## 2021-01-01 RX ORDER — SODIUM CHLORIDE 0.9 % (FLUSH) 0.9 %
5-40 SYRINGE (ML) INJECTION AS NEEDED
Status: DISCONTINUED | OUTPATIENT
Start: 2021-01-01 | End: 2021-01-01 | Stop reason: HOSPADM

## 2021-01-01 RX ORDER — METFORMIN HYDROCHLORIDE 500 MG/1
1000 TABLET, EXTENDED RELEASE ORAL 2 TIMES DAILY WITH MEALS
COMMUNITY

## 2021-01-01 RX ADMIN — Medication 10 MCG/MIN: at 18:06

## 2021-01-01 RX ADMIN — ASPIRIN 81 MG: 81 TABLET, CHEWABLE ORAL at 09:55

## 2021-01-01 RX ADMIN — PROPOFOL 60 MG: 10 INJECTION, EMULSION INTRAVENOUS at 14:14

## 2021-01-01 RX ADMIN — INSULIN LISPRO 2 UNITS: 100 INJECTION, SOLUTION INTRAVENOUS; SUBCUTANEOUS at 17:07

## 2021-01-01 RX ADMIN — HEPARIN SODIUM 18 UNITS/KG/HR: 10000 INJECTION, SOLUTION INTRAVENOUS at 20:53

## 2021-01-01 RX ADMIN — Medication 10 ML: at 18:40

## 2021-01-01 RX ADMIN — PIPERACILLIN SODIUM AND TAZOBACTAM SODIUM 3.38 G: 3; .375 INJECTION, POWDER, LYOPHILIZED, FOR SOLUTION INTRAVENOUS at 00:38

## 2021-01-01 RX ADMIN — Medication 7 MCG/MIN: at 12:22

## 2021-01-01 RX ADMIN — Medication 3 MCG/MIN: at 16:28

## 2021-01-01 RX ADMIN — FENTANYL CITRATE 50 MCG: 50 INJECTION, SOLUTION INTRAMUSCULAR; INTRAVENOUS at 14:42

## 2021-01-01 RX ADMIN — SODIUM BICARBONATE: 84 INJECTION, SOLUTION INTRAVENOUS at 18:43

## 2021-01-01 RX ADMIN — SODIUM BICARBONATE: 84 INJECTION, SOLUTION INTRAVENOUS at 04:00

## 2021-01-01 RX ADMIN — FUROSEMIDE 40 MG: 10 INJECTION, SOLUTION INTRAMUSCULAR; INTRAVENOUS at 17:12

## 2021-01-01 RX ADMIN — CEFAZOLIN SODIUM 2 G: 1 INJECTION, POWDER, FOR SOLUTION INTRAMUSCULAR; INTRAVENOUS at 18:39

## 2021-01-01 RX ADMIN — ROCURONIUM BROMIDE 35 MG: 10 INJECTION INTRAVENOUS at 14:30

## 2021-01-01 RX ADMIN — ACETAMINOPHEN 650 MG: 325 TABLET ORAL at 16:33

## 2021-01-01 RX ADMIN — ONDANSETRON 4 MG: 2 INJECTION INTRAMUSCULAR; INTRAVENOUS at 04:51

## 2021-01-01 RX ADMIN — Medication 5 MCG/MIN: at 10:37

## 2021-01-01 RX ADMIN — Medication 8 MCG/MIN: at 14:13

## 2021-01-01 RX ADMIN — INSULIN LISPRO 2 UNITS: 100 INJECTION, SOLUTION INTRAVENOUS; SUBCUTANEOUS at 11:50

## 2021-01-01 RX ADMIN — ONDANSETRON HYDROCHLORIDE 4 MG: 2 INJECTION, SOLUTION INTRAMUSCULAR; INTRAVENOUS at 17:55

## 2021-01-01 RX ADMIN — Medication 10 ML: at 14:24

## 2021-01-01 RX ADMIN — DEXTROSE MONOHYDRATE 25 G: 25 INJECTION, SOLUTION INTRAVENOUS at 04:35

## 2021-01-01 RX ADMIN — DEXTROSE MONOHYDRATE 25 G: 25 INJECTION, SOLUTION INTRAVENOUS at 01:02

## 2021-01-01 RX ADMIN — Medication 20 MCG/MIN: at 14:14

## 2021-01-01 RX ADMIN — SODIUM BICARBONATE 50 MEQ: 84 INJECTION, SOLUTION INTRAVENOUS at 15:48

## 2021-01-01 RX ADMIN — MIDAZOLAM HYDROCHLORIDE 2 MG: 1 INJECTION, SOLUTION INTRAMUSCULAR; INTRAVENOUS at 10:43

## 2021-01-01 RX ADMIN — HYDROMORPHONE HYDROCHLORIDE 0.2 MG: 2 INJECTION, SOLUTION INTRAMUSCULAR; INTRAVENOUS; SUBCUTANEOUS at 15:44

## 2021-01-01 RX ADMIN — Medication 10 MCG/MIN: at 18:16

## 2021-01-01 RX ADMIN — CALCIUM GLUCONATE 1 G: 98 INJECTION, SOLUTION INTRAVENOUS at 15:48

## 2021-01-01 RX ADMIN — Medication 10 ML: at 17:07

## 2021-01-01 RX ADMIN — CALCIUM GLUCONATE 2 G: 20 INJECTION, SOLUTION INTRAVENOUS at 02:45

## 2021-01-01 RX ADMIN — Medication 120 MCG/MIN: at 02:05

## 2021-01-01 RX ADMIN — METOPROLOL TARTRATE 12.5 MG: 25 TABLET, FILM COATED ORAL at 22:32

## 2021-01-01 RX ADMIN — ENOXAPARIN SODIUM 80 MG: 80 INJECTION SUBCUTANEOUS at 11:23

## 2021-01-01 RX ADMIN — SODIUM BICARBONATE 100 MEQ: 84 INJECTION, SOLUTION INTRAVENOUS at 06:13

## 2021-01-01 RX ADMIN — IOPAMIDOL 100 ML: 755 INJECTION, SOLUTION INTRAVENOUS at 19:01

## 2021-01-01 RX ADMIN — CEFAZOLIN 2 G: 1 INJECTION, POWDER, FOR SOLUTION INTRAMUSCULAR; INTRAVENOUS; PARENTERAL at 14:30

## 2021-01-01 RX ADMIN — ACETAMINOPHEN 650 MG: 325 TABLET ORAL at 02:56

## 2021-01-01 RX ADMIN — SODIUM CHLORIDE 250 ML: 9 INJECTION, SOLUTION INTRAVENOUS at 11:19

## 2021-01-01 RX ADMIN — PIPERACILLIN SODIUM AND TAZOBACTAM SODIUM 3.38 G: 3; .375 INJECTION, POWDER, LYOPHILIZED, FOR SOLUTION INTRAVENOUS at 09:23

## 2021-01-01 RX ADMIN — Medication 10 ML: at 03:54

## 2021-01-01 RX ADMIN — INSULIN LISPRO 2 UNITS: 100 INJECTION, SOLUTION INTRAVENOUS; SUBCUTANEOUS at 09:07

## 2021-01-01 RX ADMIN — CALCIUM GLUCONATE 1000 MG: 20 INJECTION, SOLUTION INTRAVENOUS at 01:03

## 2021-01-01 RX ADMIN — PHENYLEPHRINE HYDROCHLORIDE 300 MCG/MIN: 10 INJECTION INTRAVENOUS at 06:53

## 2021-01-01 RX ADMIN — Medication 4 MCG/MIN: at 20:59

## 2021-01-01 RX ADMIN — CEFAZOLIN SODIUM 2 G: 1 INJECTION, POWDER, FOR SOLUTION INTRAMUSCULAR; INTRAVENOUS at 23:09

## 2021-01-01 RX ADMIN — PIPERACILLIN SODIUM AND TAZOBACTAM SODIUM 3.38 G: 3; .375 INJECTION, POWDER, LYOPHILIZED, FOR SOLUTION INTRAVENOUS at 18:18

## 2021-01-01 RX ADMIN — SODIUM ZIRCONIUM CYCLOSILICATE 15 G: 10 POWDER, FOR SUSPENSION ORAL at 02:49

## 2021-01-01 RX ADMIN — SODIUM CHLORIDE 75 ML/HR: 9 INJECTION, SOLUTION INTRAVENOUS at 19:04

## 2021-01-01 RX ADMIN — METOPROLOL TARTRATE 25 MG: 25 TABLET, FILM COATED ORAL at 09:07

## 2021-01-01 RX ADMIN — HEPARIN SODIUM 12 UNITS/KG/HR: 10000 INJECTION, SOLUTION INTRAVENOUS at 04:03

## 2021-01-01 RX ADMIN — HEPARIN SODIUM 6170 UNITS: 1000 INJECTION INTRAVENOUS; SUBCUTANEOUS at 01:29

## 2021-01-01 RX ADMIN — CEFEPIME HYDROCHLORIDE 2 G: 2 INJECTION, POWDER, FOR SOLUTION INTRAVENOUS at 15:15

## 2021-01-01 RX ADMIN — ASPIRIN 81 MG: 81 TABLET, CHEWABLE ORAL at 09:07

## 2021-01-01 RX ADMIN — Medication 120 MCG/MIN: at 05:17

## 2021-01-01 RX ADMIN — INSULIN LISPRO 3 UNITS: 100 INJECTION, SOLUTION INTRAVENOUS; SUBCUTANEOUS at 12:22

## 2021-01-01 RX ADMIN — INSULIN HUMAN 10 UNITS: 100 INJECTION, SOLUTION PARENTERAL at 01:02

## 2021-01-01 RX ADMIN — INSULIN LISPRO 2 UNITS: 100 INJECTION, SOLUTION INTRAVENOUS; SUBCUTANEOUS at 08:33

## 2021-01-01 RX ADMIN — DEXTROSE MONOHYDRATE 25 G: 25 INJECTION, SOLUTION INTRAVENOUS at 15:49

## 2021-01-01 RX ADMIN — HEPARIN SODIUM 1000 UNITS: 1000 INJECTION, SOLUTION INTRAVENOUS; SUBCUTANEOUS at 16:40

## 2021-01-01 RX ADMIN — SODIUM CHLORIDE 75 ML/HR: 9 INJECTION, SOLUTION INTRAVENOUS at 10:11

## 2021-01-01 RX ADMIN — Medication 10 ML: at 06:10

## 2021-01-01 RX ADMIN — INSULIN LISPRO 2 UNITS: 100 INJECTION, SOLUTION INTRAVENOUS; SUBCUTANEOUS at 11:23

## 2021-01-01 RX ADMIN — CEFAZOLIN SODIUM 2 G: 1 INJECTION, POWDER, FOR SOLUTION INTRAMUSCULAR; INTRAVENOUS at 10:09

## 2021-01-01 RX ADMIN — CEFAZOLIN SODIUM 2 G: 1 INJECTION, POWDER, FOR SOLUTION INTRAMUSCULAR; INTRAVENOUS at 16:33

## 2021-01-01 RX ADMIN — MAGNESIUM SULFATE IN WATER 2 G: 40 INJECTION, SOLUTION INTRAVENOUS at 03:58

## 2021-01-01 RX ADMIN — ATORVASTATIN CALCIUM 40 MG: 40 TABLET, FILM COATED ORAL at 23:12

## 2021-01-01 RX ADMIN — HEPARIN SODIUM 5000 UNITS: 1000 INJECTION, SOLUTION INTRAVENOUS; SUBCUTANEOUS at 15:42

## 2021-01-01 RX ADMIN — CEFAZOLIN SODIUM 2 G: 1 INJECTION, POWDER, FOR SOLUTION INTRAMUSCULAR; INTRAVENOUS at 22:39

## 2021-01-01 RX ADMIN — ASPIRIN 81 MG: 81 TABLET, CHEWABLE ORAL at 08:34

## 2021-01-01 RX ADMIN — INSULIN LISPRO 2 UNITS: 100 INJECTION, SOLUTION INTRAVENOUS; SUBCUTANEOUS at 07:55

## 2021-01-01 RX ADMIN — ASPIRIN 300 MG: 300 SUPPOSITORY RECTAL at 09:23

## 2021-01-01 RX ADMIN — Medication 6 MCG/MIN: at 16:50

## 2021-01-01 RX ADMIN — FUROSEMIDE 40 MG: 10 INJECTION, SOLUTION INTRAMUSCULAR; INTRAVENOUS at 08:44

## 2021-01-01 RX ADMIN — LABETALOL HYDROCHLORIDE 100 MG: 100 TABLET, FILM COATED ORAL at 18:10

## 2021-01-01 RX ADMIN — ENOXAPARIN SODIUM 40 MG: 80 INJECTION SUBCUTANEOUS at 23:01

## 2021-01-01 RX ADMIN — ENOXAPARIN SODIUM 80 MG: 80 INJECTION SUBCUTANEOUS at 23:09

## 2021-01-01 RX ADMIN — Medication 100 MEQ: at 06:13

## 2021-01-01 RX ADMIN — ATORVASTATIN CALCIUM 40 MG: 40 TABLET, FILM COATED ORAL at 23:01

## 2021-01-01 RX ADMIN — Medication 10 ML: at 23:12

## 2021-01-01 RX ADMIN — Medication 10 ML: at 06:00

## 2021-01-01 RX ADMIN — Medication 10 ML: at 21:53

## 2021-01-01 RX ADMIN — INSULIN HUMAN 10 UNITS: 100 INJECTION, SOLUTION PARENTERAL at 15:48

## 2021-01-01 RX ADMIN — CEFAZOLIN SODIUM 2 G: 1 INJECTION, POWDER, FOR SOLUTION INTRAMUSCULAR; INTRAVENOUS at 09:07

## 2021-01-01 RX ADMIN — SODIUM CHLORIDE, POTASSIUM CHLORIDE, SODIUM LACTATE AND CALCIUM CHLORIDE: 600; 310; 30; 20 INJECTION, SOLUTION INTRAVENOUS at 14:02

## 2021-01-01 RX ADMIN — SODIUM BICARBONATE: 84 INJECTION, SOLUTION INTRAVENOUS at 11:59

## 2021-01-01 RX ADMIN — ATORVASTATIN CALCIUM 40 MG: 40 TABLET, FILM COATED ORAL at 21:53

## 2021-01-01 RX ADMIN — INSULIN HUMAN 5 UNITS: 100 INJECTION, SOLUTION PARENTERAL at 04:35

## 2021-01-01 RX ADMIN — SODIUM CHLORIDE 250 ML: 9 INJECTION, SOLUTION INTRAVENOUS at 14:53

## 2021-01-01 RX ADMIN — SODIUM CHLORIDE 500 ML: 9 INJECTION, SOLUTION INTRAVENOUS at 14:45

## 2021-01-01 RX ADMIN — Medication 120 MCG/MIN: at 06:05

## 2021-01-01 RX ADMIN — SUCCINYLCHOLINE CHLORIDE 120 MG: 20 INJECTION, SOLUTION INTRAMUSCULAR; INTRAVENOUS at 14:14

## 2021-01-01 RX ADMIN — SODIUM CHLORIDE 75 ML/HR: 9 INJECTION, SOLUTION INTRAVENOUS at 20:53

## 2021-01-01 RX ADMIN — FENTANYL CITRATE 100 MCG: 50 INJECTION, SOLUTION INTRAMUSCULAR; INTRAVENOUS at 10:44

## 2021-01-01 RX ADMIN — Medication 10 ML: at 23:01

## 2021-01-01 RX ADMIN — Medication 40 ML: at 22:50

## 2021-01-01 RX ADMIN — ISOSORBIDE MONONITRATE 30 MG: 30 TABLET, EXTENDED RELEASE ORAL at 08:34

## 2021-01-01 RX ADMIN — INSULIN LISPRO 2 UNITS: 100 INJECTION, SOLUTION INTRAVENOUS; SUBCUTANEOUS at 16:42

## 2021-01-01 RX ADMIN — VANCOMYCIN HYDROCHLORIDE 1000 MG: 1 INJECTION, POWDER, LYOPHILIZED, FOR SOLUTION INTRAVENOUS at 15:17

## 2021-01-01 RX ADMIN — PIPERACILLIN SODIUM AND TAZOBACTAM SODIUM 3.38 G: 3; .375 INJECTION, POWDER, LYOPHILIZED, FOR SOLUTION INTRAVENOUS at 17:14

## 2021-01-01 RX ADMIN — INSULIN LISPRO 2 UNITS: 100 INJECTION, SOLUTION INTRAVENOUS; SUBCUTANEOUS at 08:34

## 2021-01-01 RX ADMIN — SODIUM CHLORIDE 1000 ML: 9 INJECTION, SOLUTION INTRAVENOUS at 16:53

## 2021-01-01 RX ADMIN — LOSARTAN POTASSIUM 25 MG: 25 TABLET, FILM COATED ORAL at 09:07

## 2021-01-01 RX ADMIN — ENOXAPARIN SODIUM 80 MG: 80 INJECTION SUBCUTANEOUS at 22:35

## 2021-01-01 RX ADMIN — FENTANYL CITRATE 50 MCG: 50 INJECTION, SOLUTION INTRAMUSCULAR; INTRAVENOUS at 14:14

## 2021-01-01 RX ADMIN — HYDROMORPHONE HYDROCHLORIDE 0.4 MG: 2 INJECTION, SOLUTION INTRAMUSCULAR; INTRAVENOUS; SUBCUTANEOUS at 15:37

## 2021-01-01 RX ADMIN — ACETAMINOPHEN 650 MG: 325 TABLET ORAL at 03:31

## 2021-01-01 RX ADMIN — LEVOFLOXACIN 750 MG: 5 INJECTION, SOLUTION INTRAVENOUS at 16:56

## 2021-01-01 RX ADMIN — ATORVASTATIN CALCIUM 40 MG: 40 TABLET, FILM COATED ORAL at 22:35

## 2021-01-01 RX ADMIN — ENOXAPARIN SODIUM 30 MG: 100 INJECTION SUBCUTANEOUS at 18:18

## 2021-01-01 RX ADMIN — Medication 10 ML: at 21:58

## 2021-01-01 RX ADMIN — LABETALOL HYDROCHLORIDE 100 MG: 100 TABLET, FILM COATED ORAL at 08:34

## 2021-01-01 RX ADMIN — Medication 15 MCG/MIN: at 14:51

## 2021-01-01 RX ADMIN — CEFAZOLIN SODIUM 2 G: 1 INJECTION, POWDER, FOR SOLUTION INTRAMUSCULAR; INTRAVENOUS at 09:58

## 2021-01-01 RX ADMIN — ASPIRIN 81 MG: 81 TABLET, CHEWABLE ORAL at 08:35

## 2021-01-01 RX ADMIN — SODIUM CHLORIDE 1 MCG/MIN: 9 INJECTION, SOLUTION INTRAVENOUS at 03:00

## 2021-01-01 RX ADMIN — ROCURONIUM BROMIDE 10 MG: 10 INJECTION INTRAVENOUS at 15:31

## 2021-01-01 RX ADMIN — LIDOCAINE HYDROCHLORIDE 100 MG: 20 INJECTION, SOLUTION INTRAVENOUS at 14:13

## 2021-01-01 RX ADMIN — INSULIN LISPRO 2 UNITS: 100 INJECTION, SOLUTION INTRAVENOUS; SUBCUTANEOUS at 09:55

## 2021-01-01 RX ADMIN — ESMOLOL HYDROCHLORIDE 20 MG: 10 INJECTION, SOLUTION INTRAVENOUS at 17:16

## 2021-01-01 RX ADMIN — Medication 10 ML: at 05:17

## 2021-01-01 RX ADMIN — LABETALOL HYDROCHLORIDE 100 MG: 100 TABLET, FILM COATED ORAL at 09:25

## 2021-01-01 RX ADMIN — HYDROMORPHONE HYDROCHLORIDE 0.4 MG: 2 INJECTION, SOLUTION INTRAMUSCULAR; INTRAVENOUS; SUBCUTANEOUS at 15:09

## 2021-01-01 RX ADMIN — INSULIN LISPRO 3 UNITS: 100 INJECTION, SOLUTION INTRAVENOUS; SUBCUTANEOUS at 18:10

## 2021-01-01 RX ADMIN — SODIUM BICARBONATE 50 MEQ: 84 INJECTION, SOLUTION INTRAVENOUS at 23:18

## 2021-01-01 RX ADMIN — INSULIN LISPRO 2 UNITS: 100 INJECTION, SOLUTION INTRAVENOUS; SUBCUTANEOUS at 23:11

## 2021-01-01 RX ADMIN — HEPARIN SODIUM 21 UNITS/KG/HR: 10000 INJECTION, SOLUTION INTRAVENOUS at 00:09

## 2021-01-01 RX ADMIN — VANCOMYCIN HYDROCHLORIDE 750 MG: 750 INJECTION, POWDER, LYOPHILIZED, FOR SOLUTION INTRAVENOUS at 10:46

## 2021-01-01 RX ADMIN — PROTAMINE SULFATE 25 MG: 10 INJECTION, SOLUTION INTRAVENOUS at 16:58

## 2021-01-01 RX ADMIN — PHENYLEPHRINE HYDROCHLORIDE 30 MCG/MIN: 10 INJECTION INTRAVENOUS at 02:30

## 2021-01-01 RX ADMIN — HEPARIN SODIUM 17 UNITS/KG/HR: 10000 INJECTION, SOLUTION INTRAVENOUS at 19:42

## 2021-01-01 RX ADMIN — ROCURONIUM BROMIDE 5 MG: 10 INJECTION INTRAVENOUS at 14:13

## 2021-01-01 RX ADMIN — SODIUM CHLORIDE 75 ML/HR: 9 INJECTION, SOLUTION INTRAVENOUS at 09:57

## 2021-01-01 RX ADMIN — MORPHINE SULFATE 2 MG: 2 INJECTION, SOLUTION INTRAMUSCULAR; INTRAVENOUS at 04:10

## 2021-01-01 RX ADMIN — CEFAZOLIN SODIUM 2 G: 1 INJECTION, POWDER, FOR SOLUTION INTRAMUSCULAR; INTRAVENOUS at 23:01

## 2021-01-01 RX ADMIN — LOSARTAN POTASSIUM 25 MG: 25 TABLET, FILM COATED ORAL at 09:00

## 2021-01-01 RX ADMIN — HYDROCORTISONE SODIUM SUCCINATE 100 MG: 100 INJECTION, POWDER, FOR SOLUTION INTRAMUSCULAR; INTRAVENOUS at 02:44

## 2021-01-01 RX ADMIN — SODIUM CHLORIDE 500 ML: 9 INJECTION, SOLUTION INTRAVENOUS at 15:22

## 2021-11-07 PROBLEM — I73.9 PAD (PERIPHERAL ARTERY DISEASE) (HCC): Status: ACTIVE | Noted: 2021-01-01

## 2021-11-07 NOTE — ED PROVIDER NOTES
EMERGENCY DEPARTMENT HISTORY AND PHYSICAL EXAM      Date: 11/7/2021  Patient Name: Garret Garcia    History of Presenting Illness     Chief Complaint   Patient presents with    Toe Pain     pt states his toe nail came off on his right foot and is worried about the pain and the infection. pt is homeless and left AKA. HPI: Garret Garcia, 77 y.o. male with history of poorly controlled diabetes status post left AKA and apparent known right big toe infection presenting to ED with loss of right big toenail as well as purulent drainage from right toe. He states he is on antibiotics for this. He states he is followed by a physician for this but he is not sure who. He otherwise feels well. No old records on file here or at [facility]. There are no other complaints, changes, or physical findings at this time. PCP: None    No current facility-administered medications on file prior to encounter. No current outpatient medications on file prior to encounter. Past History     Past Medical History:  No past medical history on file. Past Surgical History:  No past surgical history on file. Family History:  No family history on file. Social History:  Social History     Tobacco Use    Smoking status: Not on file    Smokeless tobacco: Not on file   Substance Use Topics    Alcohol use: Not on file    Drug use: Not on file       Allergies:  No Known Allergies      Review of Systems   Review of Systems   Constitutional: Negative for chills and fever. HENT: Negative for sore throat. Eyes: Negative for redness. Respiratory: Negative for shortness of breath. Cardiovascular: Negative for chest pain. Gastrointestinal: Negative for abdominal pain. Genitourinary: Negative for dysuria. Musculoskeletal: Negative for back pain. Skin: Positive for wound. Neurological: Negative for syncope. Psychiatric/Behavioral: The patient is not nervous/anxious.     All other systems reviewed and are negative. Physical Exam   Physical Exam  Vitals and nursing note reviewed. Constitutional:       Appearance: Normal appearance. HENT:      Head: Normocephalic and atraumatic. Mouth/Throat:      Mouth: Mucous membranes are moist.   Cardiovascular:      Rate and Rhythm: Normal rate and regular rhythm. Pulmonary:      Effort: Pulmonary effort is normal.      Breath sounds: Normal breath sounds. Abdominal:      Palpations: Abdomen is soft. Tenderness: There is no abdominal tenderness. Musculoskeletal:         General: No deformity. Cervical back: Neck supple. Comments: L AKA. R big toe w/o toenail and vaseline in place, no obvious purulent drainage noted, no erythema/warmth. Skin:     General: Skin is warm and dry. Neurological:      General: No focal deficit present. Mental Status: He is alert. Psychiatric:         Mood and Affect: Mood normal.         Behavior: Behavior normal.         Diagnostic Study Results     Labs -     Recent Results (from the past 24 hour(s))   CBC WITH AUTOMATED DIFF    Collection Time: 11/07/21  3:00 PM   Result Value Ref Range    WBC 6.3 4.1 - 11.1 K/uL    RBC 4.84 4.10 - 5.70 M/uL    HGB 14.3 12.1 - 17.0 g/dL    HCT 42.4 36.6 - 50.3 %    MCV 87.6 80.0 - 99.0 FL    MCH 29.5 26.0 - 34.0 PG    MCHC 33.7 30.0 - 36.5 g/dL    RDW 12.9 11.5 - 14.5 %    PLATELET 155 371 - 095 K/uL    MPV 11.0 8.9 - 12.9 FL    NRBC 0.0 0  WBC    ABSOLUTE NRBC 0.00 0.00 - 0.01 K/uL    NEUTROPHILS 68 32 - 75 %    LYMPHOCYTES 20 12 - 49 %    MONOCYTES 9 5 - 13 %    EOSINOPHILS 1 0 - 7 %    BASOPHILS 1 0 - 1 %    IMMATURE GRANULOCYTES 1 (H) 0.0 - 0.5 %    ABS. NEUTROPHILS 4.4 1.8 - 8.0 K/UL    ABS. LYMPHOCYTES 1.2 0.8 - 3.5 K/UL    ABS. MONOCYTES 0.6 0.0 - 1.0 K/UL    ABS. EOSINOPHILS 0.0 0.0 - 0.4 K/UL    ABS. BASOPHILS 0.1 0.0 - 0.1 K/UL    ABS. IMM.  GRANS. 0.0 0.00 - 0.04 K/UL    DF AUTOMATED     METABOLIC PANEL, COMPREHENSIVE    Collection Time: 11/07/21  3:00 PM Result Value Ref Range    Sodium 130 (L) 136 - 145 mmol/L    Potassium 4.4 3.5 - 5.1 mmol/L    Chloride 97 97 - 108 mmol/L    CO2 24 21 - 32 mmol/L    Anion gap 9 5 - 15 mmol/L    Glucose 122 (H) 65 - 100 mg/dL    BUN 11 6 - 20 MG/DL    Creatinine 0.95 0.70 - 1.30 MG/DL    BUN/Creatinine ratio 12 12 - 20      GFR est AA >60 >60 ml/min/1.73m2    GFR est non-AA >60 >60 ml/min/1.73m2    Calcium 10.1 8.5 - 10.1 MG/DL    Bilirubin, total 0.4 0.2 - 1.0 MG/DL    ALT (SGPT) 18 12 - 78 U/L    AST (SGOT) 41 (H) 15 - 37 U/L    Alk. phosphatase 81 45 - 117 U/L    Protein, total 8.9 (H) 6.4 - 8.2 g/dL    Albumin 3.1 (L) 3.5 - 5.0 g/dL    Globulin 5.8 (H) 2.0 - 4.0 g/dL    A-G Ratio 0.5 (L) 1.1 - 2.2     ANKLE BRACHIAL INDEX    Collection Time: 11/07/21  4:58 PM   Result Value Ref Range    Left arm  mmHg    Right arm  mmHg    Right posterior tibial 34 mmHg    Right dorsalis pedis BP 0 mmHg    Right toe pressure 0 mmHg    Right SHARLENE 0.24     Right TBI 0.00        Radiologic Studies -   CTA ABD ART W RUNOFF W WO CONT   Final Result   Vascular:   1. Acute occlusion of the infrarenal abdominal aorta, with occlusion of nearly   all lower extremity vessels as above. There is reconstitution of the right   popliteal artery and right proximal runoff vessels from profunda femoris   collaterals. The runoff vessels are not well opacified distally, which may be   due to occlusion or flow limitation from the proximal occlusions. Abdomen/Pelvis:   1. No other acute process in the abdomen or pelvis. 2. Few small patchy groundglass opacities in the bilateral lower lobes, likely   infectious or inflammatory. The findings were called to Dr. Rosario Botello on 11/7/2021 7:16 PM by Daron Sam MD.  789         ANKLE BRACHIAL INDEX         XR FOOT RT MIN 3 V   Final Result   No acute bony abnormality. First digit soft tissue swelling without   soft tissue gas.       CT HEAD WO CONT    (Results Pending)     CT Results  (Last 48 hours)               11/07/21 1901  CTA ABD ART W RUNOFF W WO CONT Final result    Impression:  Vascular:   1. Acute occlusion of the infrarenal abdominal aorta, with occlusion of nearly   all lower extremity vessels as above. There is reconstitution of the right   popliteal artery and right proximal runoff vessels from profunda femoris   collaterals. The runoff vessels are not well opacified distally, which may be   due to occlusion or flow limitation from the proximal occlusions. Abdomen/Pelvis:   1. No other acute process in the abdomen or pelvis. 2. Few small patchy groundglass opacities in the bilateral lower lobes, likely   infectious or inflammatory. The findings were called to Dr. Freddy Wheeler on 11/7/2021 7:16 PM by Lynder Leventhal, MD.  789           Narrative:  EXAM:  CTA ABD ART W RUNOFF W WO CONT       INDICATION: decreased pulses       COMPARISON: None. CONTRAST:  100 mL of Isovue-370. TECHNIQUE:    Following the uneventful intravenous administration of contrast, thin axial   images were obtained through the abdomen, pelvis, and lower extremities. Coronal   and sagittal reconstructions were generated. MIP reconstructions were also   generated. Oral contrast was not administered. CT dose reduction was achieved   through use of a standardized protocol tailored for this examination and   automatic exposure control for dose modulation. FINDINGS:    Vascular:    Abdominal aorta: There is acute occlusion of the abdominal aorta just distal to   the origins of the renal arteries. There is severe calcific atherosclerosis of   the distal thoracic aorta and abdominal aorta. Celiac trunk: Patent. SMA: Patent. Renal arteries: Patent. LEDY: Occluded at the origin, reconstituted from SMA collaterals. Right lower extremity:   ?   Common iliac artery: Occluded. External iliac artery: Occluded. Internal iliac artery: Occluded. Common femoral artery: Occluded.  There is occlusion of a femoral-femoral bypass   graft. Profunda femoral artery: Patent with discontinuous opacification from collateral   vessels. Superficial femoral artery: Occluded. Popliteal artery: Reconstituted from collateral vessels. Runoff vessels: Patent proximally, not well-visualized at the mid and distal   aspect, which may be due to occlusion or flow-limiting lesion from proximal   occlusions. ? Left lower extremity:   Status post left above-the-knee amputation. ? Common iliac artery: Occluded. External iliac artery: Occluded. Internal iliac artery: Occluded. Common femoral artery: Occluded. There is occlusion of a femoral-femoral bypass   graft. There is occlusion of a left axillary femoral bypass graft. Profunda femoral artery: Patent with discontinuous opacification from collateral   vessels. Superficial femoral artery: Occluded. ? Abdomen/Pelvis:   Lower chest: Few small patchy areas of groundglass opacity in the bilateral   lower lobes. No pleural effusion. Liver:  No focal liver lesions. Biliary system: Gallbladder is unremarkable. No intrahepatic or extrahepatic   biliary ductal dilatation. Spleen: Normal.       Pancreas: Normal.       Kidneys/Ureters/Bladder: No renal masses. No renal or ureteral calculi. No   hydronephrosis or hydroureter. The bladder is normal.       Adrenals: Normal.       Stomach/bowel: No dilation or abnormal wall thickening is present. No free   intraperitoneal air noted. Reproductive Organs: Prostate is mildly enlarged. Nodes: No pathologically enlarged lymph nodes. Fluid: No free fluid. Bones/Soft Tissue: No acute fractures or aggressive osseous lesions are seen. Multilevel degenerative disc disease in the lumbar spine. CXR Results  (Last 48 hours)    None            Medical Decision Making   I am the first provider for this patient.     I reviewed the vital signs, available nursing notes, past medical history, past surgical history, family history and social history. Vital Signs-Reviewed the patient's vital signs. Patient Vitals for the past 24 hrs:   Temp Pulse Resp BP SpO2   11/07/21 1833 98.6 °F (37 °C) 90 14 133/70 100 %   11/07/21 1802  76  (!) 140/49 99 %   11/07/21 1700  89 18 136/81 100 %   11/07/21 1359 97.8 °F (36.6 °C) (!) 110 18 121/77 92 %         Provider Notes (Medical Decision Making):   76 yo pw primary cc of toe infection. Already has had AKA, reportedly gets care [facility] but no records on file. RLE w no pulse, cold foot but no sig pain and has been ambulating outside in cold. SHARLENE notable for extremely low flow, likely chronic since no sig pain and AKA on L but will plan for vascular consult. No signs osteo, sepsis or other on labs. Care transferred to Dr Melvin Marie at end of shift pending vascular eval, likely admission. ED Course:     Initial assessment performed. The patients presenting problems have been discussed, and they are in agreement with the care plan formulated and outlined with them. I have encouraged them to ask questions as they arise throughout their visit. Disposition:  admit    PLAN:  1. There are no discharge medications for this patient.     2.   Follow-up Information    None       Return to ED if worse     Diagnosis     Clinical Impression: aortic occlusion

## 2021-11-07 NOTE — PROGRESS NOTES
Called re: worsening R foot wound. Has been present for some time. Now reportedly with some purulence.  Patient is also a diabetic with a difficult social situation  --Agree with admission to medicine service  --Recommend CTA Abdomen pelvis w/ RLE runoff  --Will come up with a formal plan plan tomorrow once imaging has been obtained

## 2021-11-07 NOTE — PROGRESS NOTES
ED [facility] Vascular  Preliminary Report: Ankle Brachial Index    Pressure (mmHg) Right    Left    Brachial:  143   140  Ankle PTA:  34     Ankle DPA:  Unable to obtain     Great Toe:  0       Waveform:  Right   Left  Thigh:   abnormal     Calf:   abnormal     Ankle:   abnormal     Metatarsal:  abnormal    Great Toe:  Absent      Right SHARLENE:   0.24  Left SHARLENE:  Right TBI: 0  Left TBI:    Unable to obtain left SHARLENE/TBI due to LT AKA. Right SHARLENE appears critical unable to obtain toe pressures and right DP. Preliminary verbally given to ED Dr. Carpenter Later. Final report to follow.     CFA = Common Femoral Artery  PTA = Posterior Tibial Artery  DPA = Dorsalis Pedis Artery  SHARLENE = Ankle Brachial Index  TBI = Toe Brachial Index  NC = Non-compressible

## 2021-11-08 NOTE — H&P
Hospitalist Admission Note    NAME: Prashant Nguyen   :  1955   MRN:  [id number]     Date/Time:  2021 8:23 PM    Patient PCP: None  ________________________________________________________________________    My assessment of this patient's clinical condition and my plan of care is as follows. Assessment / Plan:  Acute occlusion of infrarenal abdominal aorta  Right leg ischemia with chronic ulcer  History of peripheral vascular disease s/p left AKA  -Vascular surgery was consulted and they recommended heparin drip and keeping the patient n.p.o.  -Start aspirin. Keep n.p.o. from midnight. Continue heparin drip per protocol  -Check hemoglobin A1c and fasting lipid panel  -Patient is currently hemodynamically stable--consult wound care    Acute encephalopathy may be metabolic  -Patient is currently drowsy but wakes up to commands but is having difficulty maintaining alertness  -Does not answer my questions appropriately and goes back to sleep  -Check urine drug screen. Check CT head. Ammonia level. Check urinalysis with reflex culture    Incidental bilateral lung infiltrates infiltrates on CT abdomen  -Patient does not report any shortness of breath but has cough  -Check for SARS-CoV-2 rapid and if negative, check PCR  -Get a chest x-ray.  -He has no leukocytosis or fever so holding off on antibiotics  -Check procalcitonin level in a.m. tomorrow    Diabetes mellitus type 2  Hypertension  -Check hemoglobin A1c. Start insulin sliding scale with blood sugar checks  -Currently blood pressure is controlled    I tried to reach both patient's son and also daughter numbers listed but both numbers are not accurate and were attended by people that do not know the patient.   Case management consulted for family search          Code Status: Full code by default  Surrogate Decision Maker: Unknown    DVT Prophylaxis: Heparin drip      Baseline: Patient is homeless        Subjective:   CHIEF COMPLAINT: Right leg pain    HISTORY OF PRESENT ILLNESS:     Joy Adan is a 77 y.o.   male who presents with past medical's of hypertension, diabetes mellitus is coming the hospital chief complaints of right leg pain. Patient is a poor historian so information is limited. He reports that he started having right leg pain since the last 1 week or so. He does not clearly describe the nature of pain. He does not answer my questions appropriately and goes back to sleep. Information is limited secondary to his drowsiness. On arrival to ED, he was noted to be tachycardic. On labs CBC was normal.  BMP was normal.  LFTs are normal.  He had a CTA which shows evidence of acute occlusion of infrarenal abdominal aorta with occlusion of nearly all lower extremity results. Vascular surgery was consulted and the recommended heparin drip and keeping the patient NPO. We were asked to admit for work up and evaluation of the above problems. Past medical history  Hypertension  Diabetes mellitus  Homeless    Past surgical history  Unknown    Social History     Tobacco Use    Smoking status: Not on file    Smokeless tobacco: Not on file   Substance Use Topics    Alcohol use: Not on file   Unknown as patient is a poor historian    Family history  Unknown as patient is a poor historian    No Known Allergies     Prior to Admission medications    Not on File       REVIEW OF SYSTEMS:     I am not able to complete the review of systems because:    The patient is intubated and sedated   y The patient has altered mental status due to his acute medical problems    The patient has baseline aphasia from prior stroke(s)    The patient has baseline dementia and is not reliable historian    The patient is in acute medical distress and unable to provide information           Total of 12 systems reviewed as follows:       POSITIVE= underlined text  Negative = text not underlined  General:  fever, chills, sweats, generalized weakness, weight loss/gain,      loss of appetite   Eyes:    blurred vision, eye pain, loss of vision, double vision  ENT:    rhinorrhea, pharyngitis   Respiratory:   cough, sputum production, SOB, VELASCO, wheezing, pleuritic pain   Cardiology:   chest pain, palpitations, orthopnea, PND, edema, syncope   Gastrointestinal:  abdominal pain , N/V, diarrhea, dysphagia, constipation, bleeding   Genitourinary:  frequency, urgency, dysuria, hematuria, incontinence   Muskuloskeletal :  arthralgia, myalgia, back pain  Hematology:  easy bruising, nose or gum bleeding, lymphadenopathy   Dermatological: rash, ulceration, pruritis, color change / jaundice  Endocrine:   hot flashes or polydipsia   Neurological:  headache, dizziness, confusion, focal weakness, paresthesia,     Speech difficulties, memory loss, gait difficulty  Psychological: Feelings of anxiety, depression, agitation    Objective:   VITALS:    Visit Vitals  /70   Pulse 90   Temp 98.6 °F (37 °C)   Resp 14   Ht 5' 9\" (1.753 m)   Wt 77.1 kg (170 lb)   SpO2 100%   BMI 25.10 kg/m²       PHYSICAL EXAM:    General:    no distress, appears stated age. HEENT: Atraumatic, anicteric sclerae, pink conjunctivae     No oral ulcers, mucosa moist, throat clear, dentition fair  Neck:  Supple, symmetrical,  thyroid: non tender  Lungs:   Clear to auscultation bilaterally. No Wheezing or Rhonchi. No rales. Chest wall:  No tenderness  No Accessory muscle use. Heart:   Regular  rhythm,  No  murmur   No edema  Abdomen:   Soft, non-tender. Not distended. Bowel sounds normal  Extremities: Right leg is cold to touch  Skin:     Not pale.   Not Jaundiced  No rashes   Psych:  Drowsy  Neurologic: Drowsy, wakes up to commands, does not answer my questions appropriately, follows commands    _______________________________________________________________________  Care Plan discussed with:    Comments   Patient y    Family      RN y    Care Manager                    Consultant: _______________________________________________________________________  Expected  Disposition:   Home with Family y   HH/PT/OT/RN    SNF/LTC    CODI    ________________________________________________________________________  TOTAL TIME:  61 Minutes    Critical Care Provided     Minutes non procedure based      Comments    y Reviewed previous records   >50% of visit spent in counseling and coordination of care y Discussion with patient and/or family and questions answered       ________________________________________________________________________  Signed: Familia Queen MD    Procedures: see electronic medical records for all procedures/Xrays and details which were not copied into this note but were reviewed prior to creation of Plan. LAB DATA REVIEWED:    Recent Results (from the past 24 hour(s))   CBC WITH AUTOMATED DIFF    Collection Time: 11/07/21  3:00 PM   Result Value Ref Range    WBC 6.3 4.1 - 11.1 K/uL    RBC 4.84 4.10 - 5.70 M/uL    HGB 14.3 12.1 - 17.0 g/dL    HCT 42.4 36.6 - 50.3 %    MCV 87.6 80.0 - 99.0 FL    MCH 29.5 26.0 - 34.0 PG    MCHC 33.7 30.0 - 36.5 g/dL    RDW 12.9 11.5 - 14.5 %    PLATELET 543 902 - 971 K/uL    MPV 11.0 8.9 - 12.9 FL    NRBC 0.0 0  WBC    ABSOLUTE NRBC 0.00 0.00 - 0.01 K/uL    NEUTROPHILS 68 32 - 75 %    LYMPHOCYTES 20 12 - 49 %    MONOCYTES 9 5 - 13 %    EOSINOPHILS 1 0 - 7 %    BASOPHILS 1 0 - 1 %    IMMATURE GRANULOCYTES 1 (H) 0.0 - 0.5 %    ABS. NEUTROPHILS 4.4 1.8 - 8.0 K/UL    ABS. LYMPHOCYTES 1.2 0.8 - 3.5 K/UL    ABS. MONOCYTES 0.6 0.0 - 1.0 K/UL    ABS. EOSINOPHILS 0.0 0.0 - 0.4 K/UL    ABS. BASOPHILS 0.1 0.0 - 0.1 K/UL    ABS. IMM.  GRANS. 0.0 0.00 - 0.04 K/UL    DF AUTOMATED     METABOLIC PANEL, COMPREHENSIVE    Collection Time: 11/07/21  3:00 PM   Result Value Ref Range    Sodium 130 (L) 136 - 145 mmol/L    Potassium 4.4 3.5 - 5.1 mmol/L    Chloride 97 97 - 108 mmol/L    CO2 24 21 - 32 mmol/L    Anion gap 9 5 - 15 mmol/L    Glucose 122 (H) 65 - 100 mg/dL    BUN 11 6 - 20 MG/DL    Creatinine 0.95 0.70 - 1.30 MG/DL    BUN/Creatinine ratio 12 12 - 20      GFR est AA >60 >60 ml/min/1.73m2    GFR est non-AA >60 >60 ml/min/1.73m2    Calcium 10.1 8.5 - 10.1 MG/DL    Bilirubin, total 0.4 0.2 - 1.0 MG/DL    ALT (SGPT) 18 12 - 78 U/L    AST (SGOT) 41 (H) 15 - 37 U/L    Alk.  phosphatase 81 45 - 117 U/L    Protein, total 8.9 (H) 6.4 - 8.2 g/dL    Albumin 3.1 (L) 3.5 - 5.0 g/dL    Globulin 5.8 (H) 2.0 - 4.0 g/dL    A-G Ratio 0.5 (L) 1.1 - 2.2     ANKLE BRACHIAL INDEX    Collection Time: 11/07/21  4:58 PM   Result Value Ref Range    Left arm  mmHg    Right arm  mmHg    Right posterior tibial 34 mmHg    Right dorsalis pedis BP 0 mmHg    Right toe pressure 0 mmHg    Right SHARLENE 0.24     Right TBI 0.00

## 2021-11-08 NOTE — ED NOTES
Patient signed out to me by Dr. Walker Ordonez pending vascular consultation for abnormal ABIs and foot pain. ED Course as of 11/07/21 2139   [facility] Nov 07, 2021   1647 Per vascular tech, critical SHARLENE on right [QUIQUE]   1812 Discussed with vascular surgery, Dr. Shae Lema recommends, CTA with runoff, hospitalist admit 1 [facility] Per radiology: Aorta occluded below the renal arteries [QUIQUE]   1944 Spoke with Dr. Emilie Garrison, vascular surgery, regarding CT results. NPO after midnight, heparin gtt, admit to medicine. Pt still able to wiggle foot/toes so less likely an acute occlusion [QUIQUE]   2006 Total critical care time: 45 minutes excluding separately billed procedures. Due to high probability of imminent or life threatening deterioration, the patient required my highest level of preparedness to intervene emergently. Vital organ system affected: vascular  Critical intervention: specialist consult, heparin gtt   [QUIQUE]      ED Course User Index  Marysol Beach MD     Discussed with Dr. Cecilia Carey, hospitalist, for admission.   Olga Lawler MD

## 2021-11-08 NOTE — PROGRESS NOTES
Transition of Care Plan:    RUR: 7% low   Disposition: unable to determine, daily planet   Follow up appointments: new PCP  DME needed: Pt owns a wheel chair   Transportation at Discharge: wheel chair [employer] or means to access home:  Pt is homeless       IM Medicare Letter: N/a   Is patient a BCPI-A Bundle: If yes, was Bundle Letter given?:     Caregiver Contact: inactive numbers on chart, pt declined providing a contact   Discharge Caregiver contacted prior to discharge? Reason for Admission:  Acute occlusion of infrarenal abdominal aorta   Right leg ischemia with chronic ulcer                      RUR Score:   7%                  Plan for utilizing home health:  Not indicated at this time        PCP: First and Last name:  None     Name of Practice: Pt has no PCP   Are you a current patient: Yes/No: N/a   Approximate date of last visit: N/a     Can you participate in a virtual visit with your PCP: N/a                    Current Advanced Directive/Advance Care Plan: Full Code      Healthcare Decision Maker:   Click here to complete [employer] including selection of the Healthcare Decision Maker Relationship (ie \"Primary\")                             Transition of Care Plan:      Pt is a 77year old male admitted to [facility] on 11/7/2021. CM met with Pt at bedside to complete initial assessment. Pt reported that he has been homeless since August 2021. Pt reports that he had an apartment but could not afford the rent. Pt reported that he receives about $1200 a month. Pt is uninsured. [employer] will meet Pt in the AM.     CM inquired if Pt had any family or anyone to list as an emergency contact, Pt declined. Pt stated that he has a son and daughter who he hasn't spoken to in about a year. Pt has his wheelchair at bedside.              Care Management Interventions  PCP Verified by CM: No  Transition of Care Consult (CM Consult): Discharge Planning  Support Systems: No Support Systems  Discharge Location  Discharge Placement: Unable to determine at this time      Heather Phillips, [facility], [address] [place], [place] [id number]

## 2021-11-08 NOTE — PROGRESS NOTES
Spiritual Care Partner Volunteer visited patient at [facility] in [facility] 2 MED TELE on 11/8/2021     Documented by:  Rosa Hendricks M.Div,., Zach 605 Provider   Paging Service [phone])

## 2021-11-08 NOTE — PROGRESS NOTES
Bedside shift change report given to Bhumika daly (oncoming nurse) by Emi Suarez (offgoing nurse). Report included the following information SBAR, Kardex, Procedure Summary, Intake/Output, MAR, Recent Results and Cardiac Rhythm NSR.

## 2021-11-08 NOTE — PROGRESS NOTES
End of Shift Note    Bedside shift change report given to Brenda [place] [id number] (oncoming nurse) by Danitza Null RN (offgoing nurse). Report included the following information SBAR, Kardex, Intake/Output, MAR, Accordion, Recent Results and Med Rec Status    Shift worked:  7pm-7am     Shift summary and any significant changes:     Pt admitted from ED. NPO  After midnight.  Vascular surgery on board Heparin drip on progress     Concerns for physician to address:  As above     Zone phone for oncoming shift:            Danitza Null RN

## 2021-11-08 NOTE — CONSULTS
Vascular Surgery Consult Note  11/8/2021    Subjective: Ellen Barr is a 77 y.o. with PMHx of PAD (s/p L AKA) and DM who we have been asked to evaluate for a pulseless right foot. According to the ED note, the patient presented on 11/7/21 with complaints of R toe pain and drainage after his hallux toenail had fallen off. He reported ABX treatment but was unable to recall the abx or his provider. SHARLENE completed: R 0.24; TBI 0. A CTA A/P w/ runoff revealed acute occlusion of infrarenal aorta with occlusion of fem-fem bypass, and b/l SFA occlusions w/ right reconstitution from collateral vessels. This morning, Mr. Jeff Tavarez was not cooperative with exam and possibly confused. Inez Brennan He is a poor historian and unable to relay vascular hx of both bypass and L AKA. He denied pain. No past medical history on file. No past surgical history on file. No family history on file. Social History     Tobacco Use    Smoking status: Not on file    Smokeless tobacco: Not on file   Substance Use Topics    Alcohol use: Not on file       Prior to Admission medications    Medication Sig Start Date End Date Taking? Authorizing Provider   aspirin delayed-release 81 mg tablet Take 81 mg by mouth daily. Yes Provider, Historical   isosorbide mononitrate ER (IMDUR) 30 mg tablet Take 30 mg by mouth daily. Yes Provider, Historical   metFORMIN ER (GLUCOPHAGE XR) 500 mg tablet Take 1,000 mg by mouth two (2) times daily (with meals). Yes Provider, Historical   spironolactone (ALDACTONE) 25 mg tablet Take 12.5 mg by mouth two (2) times a day. Yes Provider, Historical   losartan (COZAAR) 25 mg tablet Take 25 mg by mouth daily. Yes Provider, Historical   atorvastatin (LIPITOR) 80 mg tablet Take 80 mg by mouth daily. Yes Provider, Historical   nitroglycerin (NITROSTAT) 0.4 mg SL tablet 0.4 mg by SubLINGual route every five (5) minutes as needed for Chest Pain. Up to 3 doses.    Yes Provider, Historical   metoprolol succinate (TOPROL-XL) 25 mg XL tablet Take 25 mg by mouth daily. Yes Provider, Historical   furosemide (LASIX) 40 mg tablet Take 40 mg by mouth daily. Yes Provider, Historical   nicotine (NICODERM CQ) 21 mg/24 hr 1 Patch by TransDERmal route every twenty-four (24) hours. Yes Provider, Historical     No Known Allergies     Review of Systems:  Review of Systems   Unable to perform ROS: Other   Patient not cooperative. Objective:       Patient Vitals for the past 24 hrs:   BP Temp Pulse Resp SpO2 Height Weight   11/08/21 1112 (!) 104/54 98.6 °F (37 °C) 89 18 99 %     11/08/21 0739 122/86 98.8 °F (37.1 °C) 84 16 100 %     11/08/21 0338 (!) 136/96 98 °F (36.7 °C) (!) 110 18 100 %     11/08/21 0005 134/60 98.7 °F (37.1 °C) 89 18 100 %     11/07/21 2146 137/64 98.1 °F (36.7 °C) 91 18 98 %     11/07/21 1833 133/70 98.6 °F (37 °C) 90 14 100 %     11/07/21 1802 (!) 140/49  76  99 %     11/07/21 1700 136/81  89 18 100 %     11/07/21 1359 121/77 97.8 °F (36.6 °C) (!) 110 18 92 % 5' 9\" (1.753 m) 77.1 kg (170 lb)     ---------------------------------------------------------------------------------------------------------    Physical Exam:   Physical Exam  Vitals and nursing note reviewed. Constitutional:       General: He is not in acute distress. Appearance: He is well-developed. He is not ill-appearing, toxic-appearing or diaphoretic. Cardiovascular:      Rate and Rhythm: Normal rate. Pulses:           Femoral pulses are 0 on the right side and 0 on the left side. Dorsalis pedis pulses are 0 on the right side and 0 on the left side. Posterior tibial pulses are 0 on the right side and 0 on the left side. Pulmonary:      Effort: No respiratory distress. Abdominal:      General: Abdomen is flat. There is no distension. Palpations: Abdomen is soft. Tenderness: There is no abdominal tenderness. There is no guarding.    Musculoskeletal:      Right lower leg: No edema. Left lower leg: No edema. Feet:      Comments: Rt toe- no drainage noted, no foul odor. Skin:     General: Skin is dry. Psychiatric:         Behavior: Behavior is uncooperative. Pertinent Test Results:   Recent Results (from the past 24 hour(s))   CBC WITH AUTOMATED DIFF    Collection Time: 11/07/21  3:00 PM   Result Value Ref Range    WBC 6.3 4.1 - 11.1 K/uL    RBC 4.84 4.10 - 5.70 M/uL    HGB 14.3 12.1 - 17.0 g/dL    HCT 42.4 36.6 - 50.3 %    MCV 87.6 80.0 - 99.0 FL    MCH 29.5 26.0 - 34.0 PG    MCHC 33.7 30.0 - 36.5 g/dL    RDW 12.9 11.5 - 14.5 %    PLATELET 739 191 - 919 K/uL    MPV 11.0 8.9 - 12.9 FL    NRBC 0.0 0  WBC    ABSOLUTE NRBC 0.00 0.00 - 0.01 K/uL    NEUTROPHILS 68 32 - 75 %    LYMPHOCYTES 20 12 - 49 %    MONOCYTES 9 5 - 13 %    EOSINOPHILS 1 0 - 7 %    BASOPHILS 1 0 - 1 %    IMMATURE GRANULOCYTES 1 (H) 0.0 - 0.5 %    ABS. NEUTROPHILS 4.4 1.8 - 8.0 K/UL    ABS. LYMPHOCYTES 1.2 0.8 - 3.5 K/UL    ABS. MONOCYTES 0.6 0.0 - 1.0 K/UL    ABS. EOSINOPHILS 0.0 0.0 - 0.4 K/UL    ABS. BASOPHILS 0.1 0.0 - 0.1 K/UL    ABS. IMM. GRANS. 0.0 0.00 - 0.04 K/UL    DF AUTOMATED     METABOLIC PANEL, COMPREHENSIVE    Collection Time: 11/07/21  3:00 PM   Result Value Ref Range    Sodium 130 (L) 136 - 145 mmol/L    Potassium 4.4 3.5 - 5.1 mmol/L    Chloride 97 97 - 108 mmol/L    CO2 24 21 - 32 mmol/L    Anion gap 9 5 - 15 mmol/L    Glucose 122 (H) 65 - 100 mg/dL    BUN 11 6 - 20 MG/DL    Creatinine 0.95 0.70 - 1.30 MG/DL    BUN/Creatinine ratio 12 12 - 20      GFR est AA >60 >60 ml/min/1.73m2    GFR est non-AA >60 >60 ml/min/1.73m2    Calcium 10.1 8.5 - 10.1 MG/DL    Bilirubin, total 0.4 0.2 - 1.0 MG/DL    ALT (SGPT) 18 12 - 78 U/L    AST (SGOT) 41 (H) 15 - 37 U/L    Alk.  phosphatase 81 45 - 117 U/L    Protein, total 8.9 (H) 6.4 - 8.2 g/dL    Albumin 3.1 (L) 3.5 - 5.0 g/dL    Globulin 5.8 (H) 2.0 - 4.0 g/dL    A-G Ratio 0.5 (L) 1.1 - 2.2     CULTURE, BLOOD    Collection Time: 11/07/21  3:00 PM    Specimen: Blood   Result Value Ref Range    Special Requests: NO SPECIAL REQUESTS      Culture result: NO GROWTH AFTER 16 HOURS     ANKLE BRACHIAL INDEX    Collection Time: 11/07/21  4:58 PM   Result Value Ref Range    Left arm  mmHg    Right arm  mmHg    Right posterior tibial 34 mmHg    Right dorsalis pedis BP 0 mmHg    Right toe pressure 0 mmHg    Right SHARLENE 0.24     Right TBI 0.00    PTT    Collection Time: 11/07/21  8:58 PM   Result Value Ref Range    aPTT 26.2 22.1 - 31.0 sec    aPTT, therapeutic range     58.0 - 77.0 SECS   GLUCOSE, POC    Collection Time: 11/07/21  9:35 PM   Result Value Ref Range    Glucose (POC) 108 65 - 117 mg/dL    Performed by German Ziegler (TRV LPN)    SLSUT-36 RAPID TEST    Collection Time: 11/07/21 10:59 PM   Result Value Ref Range    Specimen source Nasopharyngeal      COVID-19 rapid test Not detected NOTD     PTT    Collection Time: 11/08/21  2:49 AM   Result Value Ref Range    aPTT 50.3 (H) 22.1 - 31.0 sec    aPTT, therapeutic range     58.0 - 77.0 SECS   CBC WITH AUTOMATED DIFF    Collection Time: 11/08/21  2:49 AM   Result Value Ref Range    WBC 7.7 4.1 - 11.1 K/uL    RBC 4.44 4.10 - 5.70 M/uL    HGB 13.1 12.1 - 17.0 g/dL    HCT 38.8 36.6 - 50.3 %    MCV 87.4 80.0 - 99.0 FL    MCH 29.5 26.0 - 34.0 PG    MCHC 33.8 30.0 - 36.5 g/dL    RDW 13.0 11.5 - 14.5 %    PLATELET 971 215 - 566 K/uL    MPV 10.6 8.9 - 12.9 FL    NRBC 0.0 0  WBC    ABSOLUTE NRBC 0.00 0.00 - 0.01 K/uL    NEUTROPHILS 50 32 - 75 %    LYMPHOCYTES 33 12 - 49 %    MONOCYTES 14 (H) 5 - 13 %    EOSINOPHILS 2 0 - 7 %    BASOPHILS 1 0 - 1 %    IMMATURE GRANULOCYTES 0 0.0 - 0.5 %    ABS. NEUTROPHILS 3.9 1.8 - 8.0 K/UL    ABS. LYMPHOCYTES 2.5 0.8 - 3.5 K/UL    ABS. MONOCYTES 1.1 (H) 0.0 - 1.0 K/UL    ABS. EOSINOPHILS 0.1 0.0 - 0.4 K/UL    ABS. BASOPHILS 0.1 0.0 - 0.1 K/UL    ABS. IMM.  GRANS. 0.0 0.00 - 0.04 K/UL    DF AUTOMATED     METABOLIC PANEL, BASIC    Collection Time: 11/08/21  2:55 AM   Result Value Ref Range    Sodium 132 (L) 136 - 145 mmol/L    Potassium 3.5 3.5 - 5.1 mmol/L    Chloride 100 97 - 108 mmol/L    CO2 23 21 - 32 mmol/L    Anion gap 9 5 - 15 mmol/L    Glucose 107 (H) 65 - 100 mg/dL    BUN 8 6 - 20 MG/DL    Creatinine 0.66 (L) 0.70 - 1.30 MG/DL    BUN/Creatinine ratio 12 12 - 20      GFR est AA >60 >60 ml/min/1.73m2    GFR est non-AA >60 >60 ml/min/1.73m2    Calcium 9.4 8.5 - 10.1 MG/DL   HEMOGLOBIN A1C WITH EAG    Collection Time: 11/08/21  2:55 AM   Result Value Ref Range    Hemoglobin A1c 7.1 (H) 4.0 - 5.6 %    Est. average glucose 157 mg/dL   LIPID PANEL    Collection Time: 11/08/21  2:55 AM   Result Value Ref Range    Cholesterol, total 165 <200 MG/DL    Triglyceride 86 <150 MG/DL    HDL Cholesterol 37 MG/DL    LDL, calculated 110.8 (H) 0 - 100 MG/DL    VLDL, calculated 17.2 MG/DL    CHOL/HDL Ratio 4.5 0.0 - 5.0     AMMONIA    Collection Time: 11/08/21  2:55 AM   Result Value Ref Range    Ammonia 12 <32 UMOL/L   PROCALCITONIN    Collection Time: 11/08/21  2:55 AM   Result Value Ref Range    Procalcitonin <0.05 ng/mL   GLUCOSE, POC    Collection Time: 11/08/21  7:50 AM   Result Value Ref Range    Glucose (POC) 149 (H) 65 - 117 mg/dL    Performed by Danae Munoz    PTT    Collection Time: 11/08/21 10:25 AM   Result Value Ref Range    aPTT 79.4 (H) 22.1 - 31.0 sec    aPTT, therapeutic range     58.0 - 77.0 SECS   DRUG SCREEN, URINE    Collection Time: 11/08/21 11:10 AM   Result Value Ref Range    AMPHETAMINES Negative NEG      BARBITURATES Negative NEG      BENZODIAZEPINES Negative NEG      COCAINE Positive (A) NEG      METHADONE Negative NEG      OPIATES Negative NEG      PCP(PHENCYCLIDINE) Negative NEG      THC (TH-CANNABINOL) Positive (A) NEG      Drug screen comment (NOTE)    GLUCOSE, POC    Collection Time: 11/08/21 12:30 PM   Result Value Ref Range    Glucose (POC) 80 65 - 117 mg/dL    Performed by Ida Schultz (ABIGAIL RN)        Assessment/Plan:   Occlusion of infrarenal abdominal aorta  Right leg ischemia with chronic ulcer  History of peripheral vascular disease s/p left AKA  - SHARLENE: R 0.24, TBI 0. L surgically absent  - CTA a/p w/ runoff 11/7/21: infrarenal abdominal aorta occlusion with b/l LE occlusions  - reconstitution with collateral vessels below poplitealA  - Right toe dressing CDI, no drainage.  - wound care consult  - numbness, decreased movement  - on heparin gtt  - aPTT 79.4  - NS 75mL/hr  - asa/statin  - OR in AM for R ax-fem and fem-pop bypass  - NPO at midnight  - verify consent    Acute encephalopathy may be metabolic  - drowsy, uncooperative  - tox screen positive for THC and cocaine  - urine drug screen positive for THC and cocaine  - blood cx NG      Diabetes mellitus type 2  - HA1C; 7.1  - SSI    Hypertension    VTE Prophylaxis: Heparin gtt    Disposition:  NPO, OR in am w/ Dr. Kasandra Lopes    Signed By: Silviano Wayne NP     November 8, 2021

## 2021-11-08 NOTE — PROGRESS NOTES
S/p remote L ax-fem and fem-fem at [facility] which he says never worked and resulted in a L AKA  He now has ischemic rest pain R foot with a threatened great toe  No patent named vessel on CTA from suprarenal aorta to R reconstituted profunda and above knee popliteal   Strong R axillary and brachial pulses  RLE vein harvest for CABG   He needs a R ax-fem and fem-pop PTFE bypass if he is to avoid R AKA    Have posted for tomorrow afternoon

## 2021-11-08 NOTE — WOUND CARE
Wound care nurse consult: consult placed to wound care by Dr Gerber Thomas for \" rt foot ulcer\". Patient is a 78 y/o AAM admitted for PAD with pain in right leg and foot. Vascular surgery also consulted to see patient and please see their notes for their assessments. No past medical history on file. PMHX: PAD with left AKA  DM type 2  Htn  Patient is homeless and toxicology + for cocaine and THC    Right foot/1st toe:      Dr Alisha Garcia plans on taking patient to [facility] lab tomorrow. Wound is not currently draining. Please defer to any wound care to Vascular team and they may consult [facility] team if the feel our input is needed after surgery.     Rip Hathaway RN, [employer]

## 2021-11-08 NOTE — PROGRESS NOTES
Hospitalist Progress Note    NAME: Marianne Sanchez   :  1955   MRN:  [id number]       Assessment / Plan:  Shortness of breath  -Will stop fluids. Give laisx. Get CXR  -Check bnp  -start ativan and ciwa protocol for cocaine as high risk of withdrawal.        Acute occlusion of infrarenal abdominal aorta  Right leg ischemia with chronic ulcer  History of peripheral vascular disease s/p left AKA  -Continue heparin drip. Vascular surgery is following  -Continue aspirin, Lipitor  -Wound care consulted    Acute encephalopathy may be metabolic  -Mental status is improved now. He is alert, awake and oriented x1  -Ammonia level is normal.  UA is pending  -UDS positive for cocaine and marijuana. Monitor for withdrawal.     Incidental bilateral lung infiltrates infiltrates on CT abdomen  -SARS-CoV-2 negative. Check chest x-ray     Diabetes mellitus type 2  Hypertension  -Hemoglobin A1c 7.1. Start insulin sliding scale with blood sugar checks  -Continue home losartan. Hold metoprolol due to cocaine use.                Code Status: Full code by default  Surrogate Decision Maker: Unknown     DVT Prophylaxis: Heparin drip        Baseline: Patient is homeless    Disposition:  MARIA T:   Barriers: Clinical improvement, patient is homeless      Body mass index is 25.1 kg/m². Subjective:     Chief Complaint / Reason for Physician Visit  Reports right foot pain. No fever. He is more alert and awake now but still has some confusion      Review of Systems:  Symptom Y/N Comments  Symptom Y/N Comments   Fever/Chills    Chest Pain     Poor Appetite    Edema     Cough    Abdominal Pain     Sputum    Joint Pain     SOB/VELASCO    Pruritis/Rash     Nausea/vomit    Tolerating PT/OT     Diarrhea    Tolerating Diet     Constipation    Other       Could NOT obtain due to:      Objective:     VITALS:   Last 24hrs VS reviewed since prior progress note.  Most recent are:  Patient Vitals for the past 24 hrs:   Temp Pulse Resp BP SpO2   11/08/21 1112 98.6 °F (37 °C) 89 18 (!) 104/54 99 %   11/08/21 0739 98.8 °F (37.1 °C) 84 16 122/86 100 %   11/08/21 0338 98 °F (36.7 °C) (!) 110 18 (!) 136/96 100 %   11/08/21 0005 98.7 °F (37.1 °C) 89 18 134/60 100 %   11/07/21 2146 98.1 °F (36.7 °C) 91 18 137/64 98 %   11/07/21 1833 98.6 °F (37 °C) 90 14 133/70 100 %   11/07/21 1802  76  (!) 140/49 99 %   11/07/21 1700  89 18 136/81 100 %   11/07/21 1359 97.8 °F (36.6 °C) (!) 110 18 121/77 92 %       Intake/Output Summary (Last 24 hours) at 11/8/2021 1316  Last data filed at 11/8/2021 0008  Gross per 24 hour   Intake    Output 325 ml   Net -325 ml        PHYSICAL EXAM:  General: Alert, cooperative, no acute distress    EENT:  EOMI. Anicteric sclerae. MMM  Resp:  CTA bilaterally, no wheezing or rales.   No accessory muscle use  CV:  Regular  rhythm,  No edema  GI:  Soft, Non distended, Non tender.  +Bowel sounds  Neurologic:  Alert and awake,  Psych:   Pleasant  Skin:  Right foot is cold    Reviewed most current lab test results and cultures  YES  Reviewed most current radiology test results   YES  Review and summation of old records today    NO  Reviewed patient's current orders and MAR    YES  PMH/SH reviewed - no change compared to H&P          Current Facility-Administered Medications:     atorvastatin (LIPITOR) tablet 40 mg, 40 mg, Oral, QHS, Olivia Belle MD    [START ON 11/9/2021] furosemide (LASIX) tablet 40 mg, 40 mg, Oral, DAILY, Olivia Belle MD    [START ON 11/9/2021] isosorbide mononitrate ER (IMDUR) tablet 30 mg, 30 mg, Oral, DAILY, Olivia Belle MD    [START ON 11/9/2021] losartan (COZAAR) tablet 25 mg, 25 mg, Oral, DAILY, Olivia Belle MD    [START ON 11/9/2021] metoprolol succinate (TOPROL-XL) XL tablet 25 mg, 25 mg, Oral, DAILY, Olivia Belle MD    nicotine (NICODERM CQ) 21 mg/24 hr patch 1 Patch, 1 Patch, TransDERmal, Q24H, Mainor Belle MD    heparin 25,000 units in D5W 250 ml infusion, 18-36 Units/kg/hr, IntraVENous, TITRATE, Andrei Link MD, Last Rate: 13.1 mL/hr at 11/08/21 1116, 17 Units/kg/hr at 11/08/21 1116    heparin (porcine) 1,000 unit/mL injection 3,080 Units, 40 Units/kg, IntraVENous, PRN **OR** heparin (porcine) 1,000 unit/mL injection 6,170 Units, 80 Units/kg, IntraVENous, PRN, Dorys Mendieta MD    sodium chloride (NS) flush 5-40 mL, 5-40 mL, IntraVENous, Q8H, Mainor Belle MD, 10 mL at 11/08/21 0517    sodium chloride (NS) flush 5-40 mL, 5-40 mL, IntraVENous, PRN, Andrei Link MD    acetaminophen (TYLENOL) tablet 650 mg, 650 mg, Oral, Q6H PRN, 650 mg at 11/08/21 0331 **OR** acetaminophen (TYLENOL) suppository 650 mg, 650 mg, Rectal, Q6H PRN, Naomie Moro, Eliberto Schilder, MD    polyethylene glycol (MIRALAX) packet 17 g, 17 g, Oral, DAILY PRN, Naomie Moro, Eliberto Schilder, MD    ondansetron (ZOFRAN ODT) tablet 4 mg, 4 mg, Oral, Q8H PRN **OR** ondansetron (ZOFRAN) injection 4 mg, 4 mg, IntraVENous, Q6H PRN, Mainor Darby MD, 4 mg at 11/08/21 0451    aspirin chewable tablet 81 mg, 81 mg, Oral, DAILY, Mainor Belle MD, 81 mg at 11/08/21 0955    insulin lispro (HUMALOG) injection, , SubCUTAneous, AC&HS, Andrei Link MD, 2 Units at 11/08/21 0955    glucose chewable tablet 16 g, 4 Tablet, Oral, PRN, Naomie Moro, Eliberto Schilder, MD    dextrose (D50W) injection syrg 12.5-25 g, 12.5-25 g, IntraVENous, PRN, Naomie Moro, Eliberto Schilder, MD    glucagon (GLUCAGEN) injection 1 mg, 1 mg, IntraMUSCular, PRN, Naomie Moro, Eliberto Schilder, MD    0.9% sodium chloride infusion, 75 mL/hr, IntraVENous, CONTINUOUS, Andrei Link MD, Last Rate: 75 mL/hr at 11/08/21 0957, 75 mL/hr at 11/08/21 0957  ________________________________________________________________________  Care Plan discussed with:    Comments   Patient y    Family      RN y    Care Manager     Consultant                        Multidiciplinary team rounds were held today with , nursing, pharmacist and clinical coordinator.   Patient's plan of care was discussed; medications were reviewed and discharge planning was addressed. ________________________________________________________________________  Total NON critical care TIME: 35    Minutes    Total CRITICAL CARE TIME Spent:   Minutes non procedure based      Comments   >50% of visit spent in counseling and coordination of care     ________________________________________________________________________  Anna Benton MD     Procedures: see electronic medical records for all procedures/Xrays and details which were not copied into this note but were reviewed prior to creation of Plan. LABS:  I reviewed today's most current labs and imaging studies.   Pertinent labs include:  Recent Labs     11/08/21  0249 11/07/21  1500   WBC 7.7 6.3   HGB 13.1 14.3   HCT 38.8 42.4    235     Recent Labs     11/08/21  0255 11/07/21  1500   * 130*   K 3.5 4.4    97   CO2 23 24   * 122*   BUN 8 11   CREA 0.66* 0.95   CA 9.4 10.1   ALB  --  3.1*   TBILI  --  0.4   ALT  --  18       Signed: Anna Betnon MD

## 2021-11-09 NOTE — ANESTHESIA PROCEDURE NOTES
Central Line Placement    Start time: 11/9/2021 11:25 AM  End time: 11/9/2021 11:41 AM  Performed by: Yari Leija CRNA  Authorized by: Carole Vargas MD     Indications: vascular access, central pressure monitoring and need for vasopressors  Preanesthetic Checklist: patient identified, risks and benefits discussed, anesthesia consent, site marked, patient being monitored and timeout performed      Pre-procedure: All elements of maximal sterile barrier technique followed?  Yes    2% Chlorhexidine for cutaneous antisepsis, Hand hygiene performed prior to catheter insertion and Ultrasound guidance    Sterile Ultrasound Technique followed?: Yes            Procedure:   Prep:  Chlorhexidine  Location: internal jugular  Orientation:  Right  Patient position:  Trendelenburg  Catheter type:  Triple lumen  Catheter size:  7 Fr  Catheter length:  16 cm  Number of attempts:  1  Successful placement: Yes      Assessment:   Post-procedure:  Catheter secured and sterile dressing with CHG applied  Assessment:  Blood return through all ports  Insertion:  Uncomplicated  Patient tolerance:  Patient tolerated the procedure well with no immediate complications

## 2021-11-09 NOTE — ANESTHESIA PREPROCEDURE EVALUATION
Relevant Problems   CARDIOVASCULAR   (+) PAD (peripheral artery disease) (HCC)       Anesthetic History   No history of anesthetic complications            Review of Systems / Medical History  Patient summary reviewed, nursing notes reviewed and pertinent labs reviewed    Pulmonary  Within defined limits                 Neuro/Psych   Within defined limits           Cardiovascular      Valvular problems/murmurs: mitral insufficiency        PAD    Exercise tolerance: <4 METS  Comments: EF 20-25%, moderate MR   GI/Hepatic/Renal                Endo/Other    Diabetes: type 2         Other Findings              Physical Exam    Airway  Mallampati: II  TM Distance: 4 - 6 cm  Neck ROM: normal range of motion   Mouth opening: Normal     Cardiovascular    Rhythm: regular  Rate: normal         Dental    Dentition: Poor dentition     Pulmonary  Breath sounds clear to auscultation               Abdominal  GI exam deferred       Other Findings            Anesthetic Plan    ASA: 4  Anesthesia type: general    Monitoring Plan: Arterial line and CVP      Induction: Intravenous  Anesthetic plan and risks discussed with: Patient

## 2021-11-09 NOTE — PERIOP NOTES
TRANSFER - OUT REPORT:    Verbal report given to [facility]  on Diony Davis  being transferred to [facility](unit) for routine post - op       Report consisted of patients Situation, Background, Assessment and   Recommendations(SBAR). Information from the following report(s) SBAR, OR Summary, Procedure Summary, Intake/Output, MAR, Recent Results and Cardiac Rhythm NSR was reviewed with the receiving nurse. Opportunity for questions and clarification was provided.       Patient transported with:   Monitor  O2 @ 2 liters  Registered Nurse  [facility]

## 2021-11-09 NOTE — PROGRESS NOTES
Received critical aPTT results -greater 130 . Heparin infusion held at 0830 . Notified MD and pharmacist.    10.30    pt refused lab been drawn . Pt currently out of flour.

## 2021-11-09 NOTE — PERIOP NOTES
Patient admitted to [facility]. Patient stated name and date of birth and said today he was having work done on right side to foot. Patient aware of surgery, but unaware of year. This nurse and Luis Manuel Najeras witnessed patient verbalizing surgery being done today, in his own words.

## 2021-11-09 NOTE — PERIOP NOTES
Irrloanpt Wound Debridement and Cleansing System  Ref: [id number] Van: [id number] LOT: [id number] Expiration Date: 2023/05/31

## 2021-11-09 NOTE — ANESTHESIA PROCEDURE NOTES
Arterial Line Placement    Start time: 11/9/2021 12:30 PM  End time: 11/9/2021 12:37 PM  Performed by: Eusebio Torres MD  Authorized by:  Eusebio Torres MD     Pre-Procedure  Indications:  Arterial pressure monitoring  Preanesthetic Checklist: patient identified, risks and benefits discussed, anesthesia consent, site marked, patient being monitored, timeout performed and patient being monitored      Procedure:   Prep:  Chlorhexidine  Seldinger Technique?: Yes    Orientation:  Left  Location:  Brachical artery  Catheter size:  20 G  Number of attempts:  1  Cont Cardiac Output Sensor: No      Assessment:   Post-procedure:  Line secured and sterile dressing applied  Patient Tolerance:  Patient tolerated the procedure well with no immediate complications

## 2021-11-09 NOTE — PERIOP NOTES
TRANSFER - IN REPORT:    Verbal report received from Liana MORA(name) on Katheren Day  being received from Room 2142(unit) for ordered procedure      Report consisted of patients Situation, Background, Assessment and   Recommendations(SBAR). Information from the following report(s) SBAR, Kardex, Intake/Output, MAR, Recent Results, Med Rec Status, Cardiac Rhythm NSR/Tachy and Procedure Verification was reviewed with the receiving nurse. Opportunity for questions and clarification was provided. Assessment completed upon patients arrival to unit and care assumed.

## 2021-11-09 NOTE — PERIOP NOTES
Handoff Report from Operating Room to PACU    Report received from Shira MORA  and [address] [place] [address] regarding Gayle Ao. Surgeon(s):  Denisse Acevedo MD  And Procedure(s) (LRB):  RIGHT AXILLO-FEMORAL BYPASS (Right)  confirmed   with allergies and dressings discussed. Anesthesia type, drugs, patient history, complications, estimated blood loss, vital signs, intake and output, and last pain medication and reversal medications were reviewed.

## 2021-11-09 NOTE — ANESTHESIA POSTPROCEDURE EVALUATION
Procedure(s):  RIGHT AXILLO-FEMORAL BYPASS. general    Anesthesia Post Evaluation        Patient location during evaluation: PACU  Note status: Adequate. Level of consciousness: responsive to verbal stimuli and sleepy but conscious  Pain management: satisfactory to patient  Airway patency: patent  Anesthetic complications: no  Cardiovascular status: acceptable  Respiratory status: acceptable  Hydration status: acceptable  Comments: +Post-Anesthesia Evaluation and Assessment    Patient: Ellen Barr MRN: [id number]  SSN: [id number]   YOB: 1955  Age: 77 y.o. Sex: male      Cardiovascular Function/Vital Signs    BP (!) 132/53   Pulse 93   Temp 36.4 °C (97.6 °F)   Resp 19   Ht 5' 9\" (1.753 m)   Wt 77.1 kg (170 lb)   SpO2 100%   BMI 25.10 kg/m²     Patient is status post Procedure(s):  RIGHT AXILLO-FEMORAL BYPASS. Nausea/Vomiting: Controlled. Postoperative hydration reviewed and adequate. Pain:  Pain Scale 1: Visual (11/09/21 1750)  Pain Intensity 1: 0 (11/09/21 1044)   Managed. Neurological Status:   Neuro (WDL): Exceptions to WDL (L BKA) (11/09/21 1051)   At baseline. Mental Status and Level of Consciousness: Arousable. Pulmonary Status:   O2 Device: Nasal cannula (11/09/21 1757)   Adequate oxygenation and airway patent. Complications related to anesthesia: None    Post-anesthesia assessment completed. No concerns. Signed By: Shirin Hernandez MD    11/9/2021  Post anesthesia nausea and vomiting:  controlled      INITIAL Post-op Vital signs:   Vitals Value Taken Time   /46 11/09/21 1845   Temp 36.4 °C (97.6 °F) 11/09/21 1757   Pulse 98 11/09/21 1855   Resp 15 11/09/21 1855   SpO2 100 % 11/09/21 1845   Vitals shown include unvalidated device data.

## 2021-11-09 NOTE — BRIEF OP NOTE
Brief Postoperative Note    Patient: Jung Vergara  YOB: 1955  MRN: [id number]    Date of Procedure: 11/9/2021     Pre-Op Diagnosis: CLTI RLE    Post-Op Diagnosis: same      Procedure(s): 1. Right axillary=>above knee popliteal bypass (8mm ringed Propaten)                        2.  Right profunda femoris Carrel patch to PTFE graft right groin     Surgeon(s):  Don Krause MD    Anesthesia: General     Estimated Blood Loss (mL): 968OX    Complications: none    Findings: 2+ R PT pulse    Implants:   Implant Name Type Inv.  Item Serial No.  Lot No. LRB No. Used Action   GRAFT VASC STR STD WALL N RNGD STRTCH 8MM RAS 70CM JOSE J GORTX - [id number]  GRAFT VASC STR STD WALL N RNGD STRTCH 8MM RAS 70CM JOSE J Celina Darius [id number]  [employer] N/A Right 1 Implanted       Electronically Signed by Ralph Wick MD on 11/9/2021 at 5:48 PM

## 2021-11-09 NOTE — PROGRESS NOTES
Hospitalist Progress Note    NAME: Junior Jones   :  1955   MRN:  [id number]       Assessment / Plan:  Acute pulmonary edema  -Chest x-ray shows evidence of mild to moderate pulmonary edema. Check proBNP  -2D echo shows ejection fraction of 25 to 30% with moderate aortic valve regurgitation and also severe mitral valve regurgitation.  -Cards consulted    Polysubstance abuse  -High risk for withdrawal  -On CIWA protocol. Continue Ativan per CIWA protocol. Mild troponin elevation rule out non-STEMI  -Troponin peaked at 301 and is now trending down. Most likely type II non-STEMI  -Cardiology consulted  -Continue aspirin, Lipitor, labetalol    Acute occlusion of infrarenal abdominal aorta  Right leg ischemia with chronic ulcer  History of peripheral vascular disease s/p left AKA  -Continue heparin drip. Going to the OR today for vascular surgery.  -Continue aspirin, Lipitor  -Wound care     Acute encephalopathy may be metabolic  -Mental status is improved now. He is alert, awake and oriented x2  -Ammonia level is normal.  UA is still pending  -UDS positive for cocaine and marijuana. Monitor for withdrawal.     Incidental bilateral lung infiltrates infiltrates on CT abdomen  -SARS-CoV-2 negative. Chest x-ray shows mild pulmonary edema     Diabetes mellitus type 2  Hypertension  -Hemoglobin A1c 7.1. Start insulin sliding scale with blood sugar checks  -Continue home losartan. Hold metoprolol due to cocaine use. Change metoprolol to labetalol. Code Status: Full code by default  Surrogate Decision Maker: Unknown     DVT Prophylaxis: Heparin drip    Baseline: Patient is homeless    Disposition:  MARIA T:   Barriers: Clinical improvement, patient is homeless      Body mass index is 25.1 kg/m². Subjective:     Chief Complaint / Reason for Physician Visit  Reports right foot pain. No fever.   He is more alert and awake now but still has some confusion      Review of Systems:  Symptom Y/N Comments  Symptom Y/N Comments   Fever/Chills    Chest Pain     Poor Appetite    Edema     Cough    Abdominal Pain     Sputum    Joint Pain     SOB/VELASCO    Pruritis/Rash     Nausea/vomit    Tolerating PT/OT     Diarrhea    Tolerating Diet     Constipation    Other       Could NOT obtain due to:      Objective:     VITALS:   Last 24hrs VS reviewed since prior progress note.  Most recent are:  Patient Vitals for the past 24 hrs:   Temp Pulse Resp BP SpO2   11/09/21 1305  87 16 (!) 118/57 95 %   11/09/21 1300  88 14 124/62 94 %   11/09/21 1255  86 12 120/63 95 %   11/09/21 1250  86 12 (!) 102/57 96 %   11/09/21 1245  87 10 (!) 111/55 96 %   11/09/21 1240  88 12 (!) 116/50 98 %   11/09/21 1235  94 18 (!) 116/57 96 %   11/09/21 1230  88 18 113/68 97 %   11/09/21 1215  91 16 (!) 113/59 98 %   11/09/21 1210  92 14 (!) 112/57 94 %   11/09/21 1205  96 12 (!) 112/57 93 %   11/09/21 1200  81 10 (!) 98/58 94 %   11/09/21 1155  92 16 (!) 110/55 94 %   11/09/21 1150  86 14 101/62 96 %   11/09/21 1145  90 12 (!) 103/54 96 %   11/09/21 1140  87 17 (!) 107/57 96 %   11/09/21 1135  97 16 104/61 95 %   11/09/21 1130  96 14 109/64 95 %   11/09/21 1125  (!) 105 13 106/65 97 %   11/09/21 1120  98 13 107/66 98 %   11/09/21 1115  (!) 103 16 108/64 98 %   11/09/21 1110  96 19 (!) 94/56 99 %   11/09/21 1044 98.2 °F (36.8 °C) 80 11 104/65 93 %   11/09/21 0841 97.8 °F (36.6 °C) 90 20 (!) 140/65 100 %   11/09/21 0300 97.7 °F (36.5 °C) 70 16 (!) 150/60 97 %   11/09/21 0137 98.5 °F (36.9 °C) 72 16 128/62 94 %   11/08/21 2007 98.4 °F (36.9 °C) 73 16 (!) 105/48 95 %   11/08/21 1906    (!) 152/68    11/08/21 1839 97.8 °F (36.6 °C) (!) 108 18 (!) 152/68 98 %   11/08/21 1750 97.8 °F (36.6 °C) (!) 116 18 (!) 140/63 100 %   11/08/21 1645  (!) 137  (!) 162/79        Intake/Output Summary (Last 24 hours) at 11/9/2021 1402  Last data filed at 11/9/2021 0501  Gross per 24 hour   Intake    Output 1775 ml   Net -1775 ml PHYSICAL EXAM:  General: Alert, cooperative, no acute distress    EENT:  EOMI. Anicteric sclerae. MMM  Resp:  CTA bilaterally, no wheezing or rales.   No accessory muscle use  CV:  Regular  rhythm,  No edema  GI:  Soft, Non distended, Non tender.  +Bowel sounds  Neurologic:  Alert and awake,  Psych:   Pleasant  Skin:  Right foot is cold    Reviewed most current lab test results and cultures  YES  Reviewed most current radiology test results   YES  Review and summation of old records today    NO  Reviewed patient's current orders and MAR    YES  PMH/SH reviewed - no change compared to H&P          Current Facility-Administered Medications:     heparin (porcine) 1,000 unit/mL injection 3,080 Units, 40 Units/kg, IntraVENous, PRN **OR** heparin (porcine) 1,000 unit/mL injection 6,170 Units, 80 Units/kg, IntraVENous, PRN, Layla Mcmullen MD, 6,170 Units at 11/09/21 0129    atorvastatin (LIPITOR) tablet 40 mg, 40 mg, Oral, QHS, Mainor Belle MD, 40 mg at 11/08/21 2312    isosorbide mononitrate ER (IMDUR) tablet 30 mg, 30 mg, Oral, DAILY, Jose Daniel Belle MD    losartan (COZAAR) tablet 25 mg, 25 mg, Oral, DAILY, Jose Daniel Belle MD    nicotine (NICODERM CQ) 21 mg/24 hr patch 1 Patch, 1 Patch, TransDERmal, Q24H, Mainor Belle MD    LORazepam (ATIVAN) injection 1 mg, 1 mg, IntraVENous, Q4H PRN, CristineJose Daniel Younger MD    furosemide (LASIX) injection 40 mg, 40 mg, IntraVENous, BID, Mainor Belle MD, 40 mg at 11/08/21 1712    labetaloL (NORMODYNE) tablet 100 mg, 100 mg, Oral, BID, Mainor Belle MD, 100 mg at 11/09/21 0925    heparin 25,000 units in D5W 250 ml infusion, 18-36 Units/kg/hr, IntraVENous, TITRATE, Layla Mcmullen MD, Held at 11/09/21 0830    sodium chloride (NS) flush 5-40 mL, 5-40 mL, IntraVENous, Q8H, Mainor Belle MD, 10 mL at 11/08/21 2312    sodium chloride (NS) flush 5-40 mL, 5-40 mL, IntraVENous, PRN, Layla Mcmullen MD    acetaminophen (TYLENOL) tablet 650 mg, 650 mg, Oral, Q6H PRN, 650 mg at 11/09/21 0256 **OR** acetaminophen (TYLENOL) suppository 650 mg, 650 mg, Rectal, Q6H PRN, Saw Lopez MD    polyethylene glycol (MIRALAX) packet 17 g, 17 g, Oral, DAILY PRN, Saw Belle MD    ondansetron (ZOFRAN ODT) tablet 4 mg, 4 mg, Oral, Q8H PRN **OR** ondansetron (ZOFRAN) injection 4 mg, 4 mg, IntraVENous, Q6H PRN, Mainor Lopez MD, 4 mg at 11/08/21 0451    aspirin chewable tablet 81 mg, 81 mg, Oral, DAILY, Mainor Belle MD, 81 mg at 11/08/21 0955    insulin lispro (HUMALOG) injection, , SubCUTAneous, AC&HS, Kathryn Keller MD, 2 Units at 11/09/21 0755    glucose chewable tablet 16 g, 4 Tablet, Oral, PRN, Saw Lopez MD    dextrose (D50W) injection syrg 12.5-25 g, 12.5-25 g, IntraVENous, PRN, Saw Lopez MD    glucagon (GLUCAGEN) injection 1 mg, 1 mg, IntraMUSCular, PRN, Kathryn Keller MD  ________________________________________________________________________  Care Plan discussed with:    Comments   Patient y    Family      RN y    Care Manager     Consultant                        Multidiciplinary team rounds were held today with , nursing, pharmacist and clinical coordinator. Patient's plan of care was discussed; medications were reviewed and discharge planning was addressed. ________________________________________________________________________  Total NON critical care TIME: 35    Minutes    Total CRITICAL CARE TIME Spent:   Minutes non procedure based      Comments   >50% of visit spent in counseling and coordination of care     ________________________________________________________________________  Kylie Brown MD     Procedures: see electronic medical records for all procedures/Xrays and details which were not copied into this note but were reviewed prior to creation of Plan. LABS:  I reviewed today's most current labs and imaging studies.   Pertinent labs include:  Recent Labs     11/09/21  0630 11/08/21  0249 11/07/21  1500   WBC 7.1 7.7 6.3 HGB 13.1 13.1 14.3   HCT 39.5 38.8 42.4    226 235     Recent Labs     11/09/21  0630 11/08/21  0255 11/07/21  1500   * 132* 130*   K 3.8 3.5 4.4   CL 96* 100 97   CO2 28 23 24   * 107* 122*   BUN 10 8 11   CREA 0.80 0.66* 0.95   CA 9.2 9.4 10.1   ALB  --   --  3.1*   TBILI  --   --  0.4   ALT  --   --  18       Signed: Familia Queen MD

## 2021-11-09 NOTE — ROUTINE PROCESS
End of Shift Note    Bedside shift change report given to na  (oncoming nurse) by Mable Faustin RN (offgoing nurse). Report included the following information SBAR    Shift worked:  afternoon     Shift summary and any significant changes:     patient complained of sob. He had diminished lungs and crackles in bases. Notified Dr. Erickson Schwab and he ordered lasix which was given and patient relaxed and voided and was more comfortable with breathing. Heparin was off when I took over the care of the patient. PTT was sent and awaiting the results. Chest xray done. Awaiting results. Patient said doctor went over the planned procedure tomorrow but he does not know what he plans to do. No complaints of pain     Concerns for physician to address:  see above     Zone phone for oncoming shift:   na        Activity:  Activity Level: Bed Rest  Number times ambulated in hallways past shift: 0  Number of times OOB to chair past shift: 0    Cardiac:   Cardiac Monitoring: Yes      Cardiac Rhythm: Sinus Tachy    Access:   Current line(s): PIV     Genitourinary:   Urinary status: voiding    Respiratory:   O2 Device: None (Room air)  Chronic home O2 use?: NO  Incentive spirometer at bedside: NO     GI:     Current diet:  ADULT DIET Regular; 3 carb choices (45 gm/meal)  DIET NPO  DIET NPO  Passing flatus: NO  Tolerating current diet: YES       Pain Management:   Patient states pain is manageable on current regimen: N/A    Skin:  Parth Score: 17  Interventions: increase time out of bed    Patient Safety:  Fall Score:  Total Score: 3  Interventions: pt to call before getting OOB  High Fall Risk: Yes    Length of Stay:  Expected LOS: 3d 21h  Actual LOS: 1      Mable Faustin RN

## 2021-11-10 NOTE — PROGRESS NOTES
Vascular Surgery Progress Note  Awa Gar, AGACNP-BC          Admit Date: 2021   LOS: 3 days        Daily Progress Note: 11/10/2021    POD:1 Day Post-Op    S/P: Procedure(s):  Right axillary=>AKpopliteal bypass w PTFE  Right profunda femoris carrel patch to PTFE graft, right groin. Subjective: Jordan Thompson is a 77 y.o. with PMHx of PAD (s/p L AKA) and DM who was found to have a left ax-fem and  fem-fem bypass occlusion with threatened RLE ischemia. He underwent a Right axillary=>AKpopliteal bypass w PTFE and Right profunda femoris carrel patch to PTFE graft at the right groin on 21  for RLE CLTI under general anesthesia. He tolerated the procedure well with no complications. There were no acute events overnight. This morning Mr. Bebe Garcia appeared cantankerous. He reported RLE pain and sensitivity to his R foot, understandably. Denies numbness, tingling, chest pain, leg pain, nausea, vomiting, difficulty swallowing, headache, and dyspnea. Objective:     Vital signs  Temp (24hrs), Av.9 °F (36.6 °C), Min:97.3 °F (36.3 °C), Max:98.8 °F (37.1 °C)   11/10 0701 - 11/10 1900  In: 300 [P.O.:300]  Out: -   1901 - 11/10 0700  In: 1200 [I.V.:1200]  Out:  [Urine:1940]    Visit Vitals  BP (!) 129/51 (BP 1 Location: Left upper arm)   Pulse (!) 112   Temp 98.7 °F (37.1 °C)   Resp 18   Ht 5' 9\" (1.753 m)   Wt 77.1 kg (170 lb)   SpO2 100%   BMI 25.10 kg/m²    O2 Flow Rate (L/min): 2 l/min O2 Device: Nasal cannula     Pain control  Pain Assessment  Pain Scale 1: Numeric (0 - 10)  Pain Intensity 1: 0  Pain Onset 1: 2 days  Pain Location 1: Leg  Pain Orientation 1: Left  Pain Description 1: Aching  Pain Intervention(s) 1: Medication (see MAR)         Physical Exam  Vitals and nursing note reviewed. Constitutional:       General: He is not in acute distress. Appearance: He is well-developed. He is not ill-appearing, toxic-appearing or diaphoretic.    Cardiovascular:      Rate and Rhythm: Normal rate. Pulses:           Femoral pulses are 0 on the right side and 0 on the left side. Dorsalis pedis pulses are 0 on the right side. Posterior tibial pulses are 1+ on the right side. Pulmonary:      Effort: No respiratory distress. Chest:      Comments: Right axilla to right lateral chest to right lateral abdomen to groin, dressing in place, CDI. Skin warm. Abdominal:      General: Abdomen is flat. There is no distension. Palpations: Abdomen is soft. Tenderness: There is no abdominal tenderness. There is no guarding. Musculoskeletal:      Right lower leg: No edema. Left lower leg: No edema. Left Lower Extremity: Left leg is amputated below knee. Feet:      Comments: Rt foot, movement intact with sensation. Sensitive to touch. Warm. Skin:     General: Skin is dry. Psychiatric:         Behavior: Behavior is uncooperative.             24 hour results:    Recent Results (from the past 24 hour(s))   TYPE & SCREEN    Collection Time: 11/09/21  2:30 PM   Result Value Ref Range    Crossmatch Expiration 11/12/2021,2359     ABO/Rh(D) [place] POSITIVE     Antibody screen NEG    GLUCOSE, POC    Collection Time: 11/09/21  5:58 PM   Result Value Ref Range    Glucose (POC) 126 (H) 65 - 117 mg/dL    Performed by Marcie King" [place]    GLUCOSE, POC    Collection Time: 11/09/21  9:15 PM   Result Value Ref Range    Glucose (POC) 138 (H) 65 - 117 mg/dL    Performed by Natalio Mendez PCT    METABOLIC PANEL, BASIC    Collection Time: 11/10/21  3:55 AM   Result Value Ref Range    Sodium 134 (L) 136 - 145 mmol/L    Potassium 4.2 3.5 - 5.1 mmol/L    Chloride 100 97 - 108 mmol/L    CO2 26 21 - 32 mmol/L    Anion gap 8 5 - 15 mmol/L    Glucose 145 (H) 65 - 100 mg/dL    BUN 7 6 - 20 MG/DL    Creatinine 0.81 0.70 - 1.30 MG/DL    BUN/Creatinine ratio 9 (L) 12 - 20      GFR est AA >60 >60 ml/min/1.73m2    GFR est non-AA >60 >60 ml/min/1.73m2    Calcium 8.5 8.5 - 10.1 MG/DL CBC W/O DIFF    Collection Time: 11/10/21  3:55 AM   Result Value Ref Range    WBC 10.3 4.1 - 11.1 K/uL    RBC 3.61 (L) 4.10 - 5.70 M/uL    HGB 10.6 (L) 12.1 - 17.0 g/dL    HCT 33.2 (L) 36.6 - 50.3 %    MCV 92.0 80.0 - 99.0 FL    MCH 29.4 26.0 - 34.0 PG    MCHC 31.9 30.0 - 36.5 g/dL    RDW 12.9 11.5 - 14.5 %    PLATELET 007 345 - 441 K/uL    MPV 11.0 8.9 - 12.9 FL    NRBC 0.0 0  WBC    ABSOLUTE NRBC 0.00 0.00 - 0.01 K/uL   NT-PRO BNP    Collection Time: 11/10/21  3:55 AM   Result Value Ref Range    NT pro-BNP 1,218 (H) <125 PG/ML   GLUCOSE, POC    Collection Time: 11/10/21  7:23 AM   Result Value Ref Range    Glucose (POC) 154 (H) 65 - 117 mg/dL    Performed by Zenobia Sauer PCT           Assessment/Plan:     Active Problems:    PAD (peripheral artery disease) ([facility] [address] (11/7/2021)      Occlusion of infrarenal abdominal aorta  Right critical limb threatening ischemia with chronic ulcer  peripheral vascular disease s/p left AKA  - s/p Right axillary=>AKpopliteal bypass w PTFE, Right profunda femoris carrel patch to PTFE graft to right groin 11/9/21  - R axillary, left lateral chest/abd dressings cdi  - R leg warm, well-perfused   - pain regimen  - pt/ot  - diet advanced  - asa/statin  - ABX ancef     Acute pulmonary edema  CHF  - per xray 11/9/21: decreased pulmonary edema  - Echo 11/8/21:EF 25-30%  Elevated troponin  - 11/9/21 peaked at 301, trending down  - cardiology consulted  - managed by primary team    Acute encephalopathy may be metabolic  Polysubstance abuse  - awake, oriented  - tox screen positive for THC and cocaine  - urine drug screen positive for THC and cocaine  - blood cx NG  - CIWA protocol  - managed by primary team        Diabetes mellitus type 2  - HA1C; 7.1  - SSI  - managed by primary team     Hypertension  - losartan/labetalol  - managed by primary team      Activity: PT/OT  DVT ppx: enoxaparin 80mg subq  Dispo:     Keila d/w Aileen Goldsmith NP

## 2021-11-10 NOTE — PROGRESS NOTES
Physical Therapy    Received PT order. Upon chart review, note that Dr. Anthony Pratt attestation to note today at [phone] states \"Bedrest today - mobilize tomorrow \". Will follow up tomorrow for eval as appropriate.     Moriah Jackson, PT

## 2021-11-10 NOTE — PROGRESS NOTES
Transition of Care Plan:     RUR: 9% low   Disposition: unable to determine, SNF vs. Medical respite   Follow up appointments: new PCP  DME needed: Pt owns a wheel chair   Transportation at Discharge: wheel chair van vs. BLS/[facility]  [place] or means to access home:  Pt is homeless       IM Medicare Letter: N/a   Is patient a BCPI-A Bundle: If yes, was Bundle Letter given?:     Caregiver Contact: inactive numbers on chart, pt declined providing a contact   Discharge Caregiver contacted prior to discharge? CM met with Pt to discuss potential plan for discharge. CM discussed SNF placement vs. Medical respite for discharge. Pt was receptive to both options. CM inquired if Pt had a preference of SNF. Pt stated that he does not have a preference and is willing to even consider LTC. CM informed Pt that insurance information would need to be reviewed to verify benefits/coverage. CM sent referrals to several SNFs for consideration: [address], [facility] [address], [place], [place], [address] [facility]         [facility], LEONA Cifuentes

## 2021-11-10 NOTE — PROGRESS NOTES
Occupational Therapy    Received OT order. Upon chart review, noted that Dr. Don Domingo attestation to note today at [phone] states \"Bedrest today - mobilize tomorrow \". Will defer today and follow up tomorrow for OT evaluation.     Beau Mahan, OTR/L

## 2021-11-10 NOTE — CONSULTS
IP Cardiology Consult       Date of consult:  11/10/21  Date of admission: 11/7/2021  Primary Cardiologist: ERIC  Physician Requesting consult: Dr Michaela Cowart       Assessment:    Problem list:   PVD, S/p AKA, h/o left ax-fem and  fem-fem bypass > with occlusion of bypass and distal infrarenal aorta and threatened RLE ischemia on 11/8/2021. S/p Right axillary=>AKpopliteal bypass w PTFE and Right profunda femoris carrel patch to PTFE graft at the right groin on 11/9/21  for RLE CLTI under general anesthesia. Systolic heart failure with EF 25%   Moderate to severe MR   Pulmonary edema on CXR   Minimal trop elevation - is type 2 with CMP and cocaine use, no chest pain   HTN  DM  Polysubstance use, Cocaine and marijuvana +  Smoking   Homeless    Poor historian - not able to tell me if he has any cardiac testing or diagnosis          Recommendations:    No cardiac symptoms at present, s/p vascular surgery   No indication for cardiac work up at present   Appears was supposed to be on metoprolol, spironolactone and losartan - wonder had diagnosed with CMP before   Says he may be admitted to [facility] in past- will try to obtain record    BP low currently. Restart HF medications as BP allows   Did receive lasix - no evidence of fluid overload at present - will d/c IV lasix   Cont aspirin and statin   If can follow up then will need ischemic work up if never done before and repeat echo in 3 months on GDMT   Need to stop smoking, and Cocaine and marijuvana use   Social work/ case management consult     Thank you for this consult and allowing me to take part in this patients care. Please call with questions. [x]        High complexity decision making was performed      CC / Reason for consult:  Chf    History of the presenting illness:  Giancarlo Ralph is a 77 y.o.   Patient poor historian - Does not know if he has cardiac history and does not know or tells me which hospital he has been admitted to in past. Presented with critical limb ischemia. Has R toe pain and drainage after his hallux toenail had fallen off. He reported ABX treatment recently. A CTA  Showed acute occlusion of infrarenal aorta with occlusion of fem-fem bypass, and b/l SFA occlusions w/ right reconstitution from collateral vessels. underwent a Right axillary=>AKpopliteal bypass w PTFE and Right profunda femoris carrel patch to PTFE graft at the right groin on 11/9/21  for RLE CLTI under general anesthesia. He tolerated the procedure well with no complications. Echo showed EF 25%, moderate to severe. no cardiac symptoms, feeling fine, no CP or SOB. UTOX positive for cocaine   Minimal trop elevation. BNP elevated. Cardiology consulted. Appears he is on medications such as metoprolol, losartan, and spironolactone -suggest probably diagnosed with CMP before- he is unsure if he has any cardiac testing and does not know which hospitals he has been through. When I mentioned [facility] he says he might have been there before.        PMH:   PVD  DM  HTN  ? CARDIAC HISTORY   Patient poor historian - Does not know if he has cardiac history and does not know or tells me which hospital he has been admitted to in past     Prior to Admission medications    Medication Sig Start Date End Date Taking? Authorizing Provider   aspirin delayed-release 81 mg tablet Take 81 mg by mouth daily. Yes Provider, Historical   isosorbide mononitrate ER (IMDUR) 30 mg tablet Take 30 mg by mouth daily. Yes Provider, Historical   metFORMIN ER (GLUCOPHAGE XR) 500 mg tablet Take 1,000 mg by mouth two (2) times daily (with meals). Yes Provider, Historical   spironolactone (ALDACTONE) 25 mg tablet Take 12.5 mg by mouth two (2) times a day. Yes Provider, Historical   losartan (COZAAR) 25 mg tablet Take 25 mg by mouth daily. Yes Provider, Historical   atorvastatin (LIPITOR) 80 mg tablet Take 80 mg by mouth daily.    Yes Provider, Historical   nitroglycerin (NITROSTAT) 0.4 mg SL tablet 0.4 mg by SubLINGual route every five (5) minutes as needed for Chest Pain. Up to 3 doses. Yes Provider, Historical   metoprolol succinate (TOPROL-XL) 25 mg XL tablet Take 25 mg by mouth daily. Yes Provider, Historical   furosemide (LASIX) 40 mg tablet Take 40 mg by mouth daily. Yes Provider, Historical   nicotine (NICODERM CQ) 21 mg/24 hr 1 Patch by TransDERmal route every twenty-four (24) hours. Yes Provider, Historical     FHl- No FH of premature CAD     Social History     Socioeconomic History    Marital status: SINGLE     Spouse name: Not on file    Number of children: Not on file    Years of education: Not on file    Highest education level: Not on file   Occupational History    Not on file   Tobacco Use    Smoking status: Not on file    Smokeless tobacco: Not on file   Substance and Sexual Activity    Alcohol use: Not on file    Drug use: Not on file    Sexual activity: Not on file   Other Topics Concern    Not on file   Social History Narrative    Not on file     Social Determinants of Health     Financial Resource Strain:     Difficulty of Paying Living Expenses: Not on file   Food Insecurity:     Worried About Running Out of Food in the Last Year: Not on file    Fabiola of Food in the Last Year: Not on file   Transportation Needs:     Lack of Transportation (Medical): Not on file    Lack of Transportation (Non-Medical):  Not on file   Physical Activity:     Days of Exercise per Week: Not on file    Minutes of Exercise per Session: Not on file   Stress:     Feeling of Stress : Not on file   Social Connections:     Frequency of Communication with Friends and Family: Not on file    Frequency of Social Gatherings with Friends and Family: Not on file    Attends [employer] Services: Not on file    Active Member of Clubs or Organizations: Not on file    Attends Club or Organization Meetings: Not on file    Marital Status: Not on file   Intimate Partner Violence:     Fear of Current or Ex-Partner: Not on file    Emotionally Abused: Not on file    Physically Abused: Not on file    Sexually Abused: Not on file   Housing Stability:     Unable to Pay for Housing in the Last Year: Not on file    Number of Bhupinder in the Last Year: Not on file    Unstable Housing in the Last Year: Not on file         ROS      Total of 12 systems reviewed, all systems review was negative except Pertinent Positives included in HPI     Visit Vitals  BP (!) 105/47 Comment: nurse alerted   Pulse 97   Temp 98.9 °F (37.2 °C)   Resp 16   Ht 5' 9\" (1.753 m)   Wt 77.1 kg (170 lb)   SpO2 97%   BMI 25.10 kg/m²       Physical Exam  Examination:     General: Alert + Oriented x3, no acute distress   HEENT: Normocephalic aromatic, MMM   Neck: Supple, JVP- not well appreciated   RS: Non labored, clear   CVS: Regular rate and rhythm, S1S2, SM  Abd: Soft, non tender, non distended   Lower extremity: no edema, Lt amputation, right side ischemic changes   Skin: Warm and dry   CNS: Oriented x3, no focal neuro deficit     Lab review:  BMP:   Lab Results   Component Value Date/Time     (L) 11/10/2021 03:55 AM    K 4.2 11/10/2021 03:55 AM     11/10/2021 03:55 AM    CO2 26 11/10/2021 03:55 AM    AGAP 8 11/10/2021 03:55 AM     (H) 11/10/2021 03:55 AM    BUN 7 11/10/2021 03:55 AM    CREA 0.81 11/10/2021 03:55 AM    GFRAA >60 11/10/2021 03:55 AM    GFRNA >60 11/10/2021 03:55 AM        CBC:  Lab Results   Component Value Date/Time    WBC 10.3 11/10/2021 03:55 AM    HGB 10.6 (L) 11/10/2021 03:55 AM    HCT 33.2 (L) 11/10/2021 03:55 AM    PLATELET 473 64/62/9145 03:55 AM    MCV 92.0 11/10/2021 03:55 AM       All Cardiac Markers in the last 24 hours:    Lab Results   Component Value Date/Time    BNPNT 1,218 (H) 11/10/2021 03:55 AM       Data review:    Tele:  NSR     EKG tracing personally reviewed:   ERIC  Echocardiogram:  Result status: Final result  · MV: Mild mitral annular calcification.  Moderate to severe mitral valve regurgitation is present. · LV: Estimated LVEF is 25 - 30%. Normal wall thickness. Moderately dilated left ventricle. Severely reduced systolic function. Left ventricular diastolic dysfunction. · AV: Mild to moderate aortic valve regurgitation is present. · LA: Mildly dilated left atrium. Other cardiac testing:      Other imaging:  SHARLENE reviewed - abnormal   CTA  1. Acute occlusion of the infrarenal abdominal aorta, with occlusion of nearly  all lower extremity vessels as above. There is reconstitution of the right  popliteal artery and right proximal runoff vessels from profunda femoris  collaterals.  The runoff vessels are not well opacified distally, which may be  due to occlusion or flow limitation from the proximal occlusions.     Abdomen    Signed:  Braxton Anne MD  Interventional Cardiology  11/10/2021

## 2021-11-10 NOTE — PROGRESS NOTES
Hospitalist Progress Note    NAME: Ritchie Cody   :  1955   MRN:  [id number]   Room Number:  2142/01  @ [facility]       Interim Hospital Summary: 77 y.o. male whom presented on 2021 with      Assessment / Plan:  Anticipated discharge date :   Anticipated disposition : Home  Barriers to discharge : medical stability    Acute pulmonary edema POA  Chest x-ray shows evidence of mild to moderate pulmonary edema. 2D echo shows ejection fraction of 25 to 30% with moderate aortic valve regurgitation and also severe mitral valve regurgitation.    -Cardiology consulted - recommend no further diagnostics at this time. Recommend medical therapy. - continue aspirin, lipitor, labetalol.        Mild troponin elevation rule out non-STEMI  -Troponin peaked at 301 and is now trending down. Most likely type II non-STEMI  -Cardiology following-ischemia evaluation not indicated. -Continue aspirin, Lipitor, labetalol       Acute occlusion of infrarenal abdominal aorta POA  Right leg ischemia with chronic ulcer POA  History of peripheral vascular disease s/p left AKA  S/p right axillo-femoral bypass.     -Continue aspirin, Lipitor  -Wound care            Acute encephalopathy POA, improving  Polysubstance abuse POA  Likely due to polysubstance abuse. UDS positive for cocaine and marijuana. - CIWA monitoring  - Thiamine, folic acid     Incidental bilateral lung infiltrates infiltrates on CT abdomen  -SARS-CoV-2 negative. Chest x-ray shows mild pulmonary edema       Diabetes mellitus type 2  Lab Results   Component Value Date/Time    Hemoglobin A1c 7.1 (H) 2021 02:55 AM       - Lispro correctional scale, FSG AC HS  - Consistent carb diet, hypoglycemia protocol. Hypertension POA  -Continue home losartan.     - Changed metoprolol to labetalol.     Code Status: Full code  Surrogate Decision Maker: Unknown     DVT Prophylaxis: Heparin drip     Baseline: Patient is homeless     Body mass index is 25.1 kg/m². Subjective:     Chief Complaint / Reason for Physician Visit  \"I dont feel too good I feel like im weak\". Discussed with RN events overnight. Review of Systems:  No fevers, chills, appetite change, cough, sputum production, shortness of breath, dyspnea on exertion, nausea, vomitting, diarrhea, constipation, chest pain, leg edema, abdominal pain, joint pain, rash, itching. Tolerating PT/OT. Tolerating diet. Objective:     VITALS:   Last 24hrs VS reviewed since prior progress note.  Most recent are:  Patient Vitals for the past 24 hrs:   Temp Pulse Resp BP SpO2   11/10/21 0717 98.7 °F (37.1 °C)  18 (!) 129/51 100 %   11/10/21 0541     100 %   11/10/21 0339 98.8 °F (37.1 °C) (!) 112 16 128/61 100 %   11/09/21 2312 97.3 °F (36.3 °C) (!) 102 16 (!) 130/50 100 %   11/09/21 2200  98  111/69    11/09/21 2100  (!) 102  (!) 126/58    11/09/21 2003 97.4 °F (36.3 °C) 95 12 137/73 100 %   11/09/21 1945  98 9 (!) 140/61 100 %   11/09/21 1930  100 16 (!) 132/42 100 %   11/09/21 1915  96 16 (!) 126/55 100 %   11/09/21 1900 97.6 °F (36.4 °C) 93 15 (!) 123/56 100 %   11/09/21 1845  95 15 (!) 133/46 100 %   11/09/21 1830  93 19 (!) 132/53 100 %   11/09/21 1825  94 21  100 %   11/09/21 1820  97 10  99 %   11/09/21 1815  94 15 (!) 145/68 98 %   11/09/21 1810  99 17 (!) 145/44 99 %   11/09/21 1805  94 14 (!) 143/67 100 %   11/09/21 1800  97 11 (!) 141/65 100 %   11/09/21 1757 97.6 °F (36.4 °C) 99 17 135/71 100 %   11/09/21 1755  (!) 104 16 135/71    11/09/21 1750    (!) 141/71    11/09/21 1305  87 16 (!) 118/57 95 %   11/09/21 1300  88 14 124/62 94 %   11/09/21 1255  86 12 120/63 95 %   11/09/21 1250  86 12 (!) 102/57 96 %   11/09/21 1245  87 10 (!) 111/55 96 %   11/09/21 1240  88 12 (!) 116/50 98 %   11/09/21 1235  94 18 (!) 116/57 96 %   11/09/21 1230  88 18 113/68 97 %   11/09/21 1215  91 16 (!) 113/59 98 %   11/09/21 1210  92 14 (!) 112/57 94 %   11/09/21 1205  96 12 (!) 112/57 93 %   11/09/21 1200  81 10 (!) 98/58 94 %   11/09/21 1155  92 16 (!) 110/55 94 %   11/09/21 1150  86 14 101/62 96 %   11/09/21 1145  90 12 (!) 103/54 96 %   11/09/21 1140  87 17 (!) 107/57 96 %   11/09/21 1135  97 16 104/61 95 %   11/09/21 1130  96 14 109/64 95 %   11/09/21 1125  (!) 105 13 106/65 97 %   11/09/21 1120  98 13 107/66 98 %   11/09/21 1115  (!) 103 16 108/64 98 %   11/09/21 1110  96 19 (!) 94/56 99 %   11/09/21 1044 98.2 °F (36.8 °C) 80 11 104/65 93 %       Intake/Output Summary (Last 24 hours) at 11/10/2021 2136  Last data filed at 11/10/2021 7253  Gross per 24 hour   Intake 1500 ml   Output 640 ml   Net 860 ml        PHYSICAL EXAM:  General: Alert, cooperative, no acute distress    EENT:  EOMI. Anicteric sclerae. MMM  Resp:  CTA bilaterally, no wheezing or rales. No accessory muscle use  CV:  Regular  rhythm,  normal S1/S2, no murmurs rubs gallops, No edema  GI:  Soft, Non distended, Non tender. +Bowel sounds  Neurologic:  Alert and oriented X 3, normal speech,   Psych:   Good insight. Not anxious nor agitated  Skin:  No rashes. No jaundice    Reviewed most current lab test results and cultures  YES  Reviewed most current radiology test results   YES  Review and summation of old records today    NO  Reviewed patient's current orders and MAR    YES  PMH/SH reviewed - no change compared to H&P  ________________________________________________________________________  Care Plan discussed with:    Comments   Patient x    Family      RN x    Care Manager x    Consultant  x                     x Multidiciplinary team rounds were held today with , nursing, pharmacist and clinical coordinator. Patient's plan of care was discussed; medications were reviewed and discharge planning was addressed.      ________________________________________________________________________  Total NON critical care TIME:  35Minutes    Total CRITICAL CARE TIME Spent:   Minutes non procedure based      Comments   >50% of visit spent in counseling and coordination of care x    ________________________________________________________________________  Callie Champagne MD     Procedures: see electronic medical records for all procedures/Xrays and details which were not copied into this note but were reviewed prior to creation of Plan. LABS:  I reviewed today's most current labs and imaging studies. Pertinent labs include:  Recent Labs     11/10/21  0355 11/09/21  0630 11/08/21  0249   WBC 10.3 7.1 7.7   HGB 10.6* 13.1 13.1   HCT 33.2* 39.5 38.8    237 226     Recent Labs     11/10/21  0355 11/09/21  0630 11/08/21  0255 11/07/21  1500 11/07/21  1500   * 132* 132*   < > 130*   K 4.2 3.8 3.5   < > 4.4    96* 100   < > 97   CO2 26 28 23   < > 24   * 173* 107*   < > 122*   BUN 7 10 8   < > 11   CREA 0.81 0.80 0.66*   < > 0.95   CA 8.5 9.2 9.4   < > 10.1   ALB  --   --   --   --  3.1*   TBILI  --   --   --   --  0.4   ALT  --   --   --   --  18    < > = values in this interval not displayed.        Signed: Callie Champagne MD

## 2021-11-11 NOTE — PROGRESS NOTES
Vascular Surgery Progress Note  Laila Branch, AGACNP-BC     Admit Date: 2021   LOS: 4 days        Daily Progress Note: 2021    POD: 2 Days Post-Op    S/P: Procedure(s):  Right axillary=>AKpopliteal bypass w PTFE  Right profunda femoris carrel patch to PTFE graft, right groin. Subjective: Loida Enriquez is a 77 y.o. with PMHx of PAD (s/p L AKA) and DM who was found to have a left ax-fem and  fem-fem bypass occlusion with threatened RLE ischemia. He underwent a Right axillary=>AKpopliteal bypass w PTFE and Right profunda femoris carrel patch to PTFE graft at the right groin on 21  for RLE CLTI under general anesthesia. He tolerated the procedure well with no complications. There were no acute events overnight. This morning Mr. Michael Davis was resting comfortably in bed and reported no pain. Denies numbness, tingling, chest pain, leg pain, nausea, vomiting, difficulty swallowing, headache, and dyspnea. Objective:     Vital signs  Temp (24hrs), Av.3 °F (37.4 °C), Min:98.9 °F (37.2 °C), Max:100 °F (37.8 °C)   No intake/output data recorded.  1901 -  0700  In: 700 [P.O.:700]  Out:  [Urine:1975]    Visit Vitals  BP (!) 130/58   Pulse (!) 105   Temp 99.3 °F (37.4 °C)   Resp 16   Ht 5' 9\" (1.753 m)   Wt 77.1 kg (170 lb)   SpO2 100%   BMI 25.10 kg/m²    O2 Flow Rate (L/min): 2 l/min O2 Device: Nasal cannula     Pain control  Pain Assessment  Pain Scale 1: Numeric (0 - 10)  Pain Intensity 1: 0  Pain Onset 1: 2 days  Pain Location 1: Leg  Pain Orientation 1: Left  Pain Description 1: Aching  Pain Intervention(s) 1: Medication (see MAR)         Physical Exam  Vitals and nursing note reviewed. Constitutional:       General: He is not in acute distress. Appearance: He is well-developed. He is not ill-appearing, toxic-appearing or diaphoretic. Cardiovascular:      Rate and Rhythm: Normal rate.       Pulses:           Femoral pulses are 0 on the right side and 0 on the left side. Dorsalis pedis pulses are 0 on the right side. Posterior tibial pulses are 1+ on the right side. Pulmonary:      Effort: No respiratory distress. Chest:      Comments: Right axilla to right lateral chest to right lateral abdomen to groin, dressing in place, CDI. Skin warm. Abdominal:      General: Abdomen is flat. There is no distension. Palpations: Abdomen is soft. Tenderness: There is no abdominal tenderness. There is no guarding. Musculoskeletal:      Right lower leg: No edema. Left lower leg: No edema. Left Lower Extremity: Left leg is amputated below knee. Feet:      Comments: Rt LLE: thigh dressing CDI,   Rt foot, movement intact with sensation. Sensitive to touch. Warm. Skin:     General: Skin is dry. Psychiatric:         Behavior: Behavior is uncooperative.             24 hour results:    Recent Results (from the past 24 hour(s))   GLUCOSE, POC    Collection Time: 11/10/21 10:53 AM   Result Value Ref Range    Glucose (POC) 189 (H) 65 - 117 mg/dL    Performed by Sabino Half PCT    GLUCOSE, POC    Collection Time: 11/10/21  3:52 PM   Result Value Ref Range    Glucose (POC) 197 (H) 65 - 117 mg/dL    Performed by Sabino Half PCT    GLUCOSE, POC    Collection Time: 11/10/21  9:12 PM   Result Value Ref Range    Glucose (POC) 176 (H) 65 - 117 mg/dL    Performed by Yelena Firebaugh    CBC WITH AUTOMATED DIFF    Collection Time: 11/11/21  1:42 AM   Result Value Ref Range    WBC 10.6 4.1 - 11.1 K/uL    RBC 2.93 (L) 4.10 - 5.70 M/uL    HGB 8.7 (L) 12.1 - 17.0 g/dL    HCT 26.5 (L) 36.6 - 50.3 %    MCV 90.4 80.0 - 99.0 FL    MCH 29.7 26.0 - 34.0 PG    MCHC 32.8 30.0 - 36.5 g/dL    RDW 12.9 11.5 - 14.5 %    PLATELET 621 817 - 705 K/uL    MPV 11.2 8.9 - 12.9 FL    NRBC 0.0 0  WBC    ABSOLUTE NRBC 0.00 0.00 - 0.01 K/uL    NEUTROPHILS 72 32 - 75 %    LYMPHOCYTES 13 12 - 49 %    MONOCYTES 14 (H) 5 - 13 %    EOSINOPHILS 0 0 - 7 %    BASOPHILS 0 0 - 1 % IMMATURE GRANULOCYTES 1 (H) 0.0 - 0.5 %    ABS. NEUTROPHILS 7.7 1.8 - 8.0 K/UL    ABS. LYMPHOCYTES 1.4 0.8 - 3.5 K/UL    ABS. MONOCYTES 1.4 (H) 0.0 - 1.0 K/UL    ABS. EOSINOPHILS 0.0 0.0 - 0.4 K/UL    ABS. BASOPHILS 0.0 0.0 - 0.1 K/UL    ABS. IMM. GRANS. 0.1 (H) 0.00 - 0.04 K/UL    DF AUTOMATED     METABOLIC PANEL, BASIC    Collection Time: 11/11/21  1:42 AM   Result Value Ref Range    Sodium 130 (L) 136 - 145 mmol/L    Potassium 3.3 (L) 3.5 - 5.1 mmol/L    Chloride 94 (L) 97 - 108 mmol/L    CO2 26 21 - 32 mmol/L    Anion gap 10 5 - 15 mmol/L    Glucose 150 (H) 65 - 100 mg/dL    BUN 9 6 - 20 MG/DL    Creatinine 0.76 0.70 - 1.30 MG/DL    BUN/Creatinine ratio 12 12 - 20      GFR est AA >60 >60 ml/min/1.73m2    GFR est non-AA >60 >60 ml/min/1.73m2    Calcium 8.1 (L) 8.5 - 10.1 MG/DL   MAGNESIUM    Collection Time: 11/11/21  1:42 AM   Result Value Ref Range    Magnesium 1.2 (L) 1.6 - 2.4 mg/dL   PHOSPHORUS    Collection Time: 11/11/21  1:42 AM   Result Value Ref Range    Phosphorus 2.1 (L) 2.6 - 4.7 MG/DL   HEPATIC FUNCTION PANEL    Collection Time: 11/11/21  1:42 AM   Result Value Ref Range    Protein, total 5.9 (L) 6.4 - 8.2 g/dL    Albumin 2.1 (L) 3.5 - 5.0 g/dL    Globulin 3.8 2.0 - 4.0 g/dL    A-G Ratio 0.6 (L) 1.1 - 2.2      Bilirubin, total 0.5 0.2 - 1.0 MG/DL    Bilirubin, direct 0.2 0.0 - 0.2 MG/DL    Alk.  phosphatase 47 45 - 117 U/L    AST (SGOT) 39 (H) 15 - 37 U/L    ALT (SGPT) 10 (L) 12 - 78 U/L   GLUCOSE, POC    Collection Time: 11/11/21  7:46 AM   Result Value Ref Range    Glucose (POC) 194 (H) 65 - 117 mg/dL    Performed by Brian Dunaway RN           Assessment/Plan:     Active Problems:    PAD (peripheral artery disease) ([facility] [address] (11/7/2021)      Occlusion of infrarenal abdominal aorta  Right critical limb threatening ischemia with chronic ulcer  peripheral vascular disease s/p left AKA  - s/p Right axillary=>AKpopliteal bypass w PTFE, Right profunda femoris carrel patch to PTFE graft to right groin 11/9/21  - R axillary, left lateral chest/abd dressings cdi  - R leg warm, well-perfused   - pain regimen  -  Pt/OT today  - asa/statin  - ABX ancef     Acute pulmonary edema  CHF  - per xray 11/9/21: decreased pulmonary edema  - Echo 11/8/21:EF 25-30%  Elevated troponin  - 11/9/21 peaked at 301, trending down  - cardiology consulted  - managed by primary team    Acute encephalopathy may be metabolic  Polysubstance abuse  - awake, oriented  - tox screen positive for THC and cocaine  - urine drug screen positive for THC and cocaine  - blood cx NG  - CIWA protocol  - managed by primary team     Electrolyte imbalance  - Na 130   - K 3.3  - Mag 1.2  - Ca 8.1  - Ph 21  - asymptomatic  - ECG pending    Diabetes mellitus type 2  - HA1C; 7.1  - SSI  - managed by primary team     Hypertension  - losartan/metoprolol   - managed by primary team    Tobacco abuse  - nicoderm patch  - smoking cessation counseling.       Activity: PT/OT  DVT ppx: enoxaparin 40mg subq  Dispo:     Plan d/w Manas Porras NP

## 2021-11-11 NOTE — PROGRESS NOTES
Bedside and Verbal shift change report given to Chaparrita (oncoming nurse) by Ching Mcwilliams (offgoing nurse). Report included the following information SBAR, Kardex, Intake/Output, MAR and Recent Results.

## 2021-11-11 NOTE — PROGRESS NOTES
2000 Patient bladder scanned showing 200ml of urine; patient not needing to void at this time. 2245 Patient bladder scanned showing >147 ml of urine; patient not needing to void at this time. 0020  Patient bladder scanned showing 186ml of urine; patient not needing to void at this time. 0200 Patient bladder scanned showing 337ml of urine; patient straight cathed per protocol. 400cc of urine drained. Problem: Falls - Risk of  Goal: *Absence of Falls  Description: Document Danne Lobe Fall Risk and appropriate interventions in the flowsheet.   Outcome: Progressing Towards Goal  Note: Fall Risk Interventions:  Mobility Interventions: Bed/chair exit alarm, OT consult for ADLs, Patient to call before getting OOB, PT Consult for mobility concerns, PT Consult for assist device competence    Mentation Interventions: Adequate sleep, hydration, pain control, Bed/chair exit alarm, Door open when patient unattended, Evaluate medications/consider consulting pharmacy, Eyeglasses and hearing aids, Familiar objects from home, Gait belt with transfers/ambulation    Medication Interventions: Bed/chair exit alarm, Evaluate medications/consider consulting pharmacy, Patient to call before getting OOB, Teach patient to arise slowly, Utilize gait belt for transfers/ambulation    Elimination Interventions: Bed/chair exit alarm, Call light in reach, Elevated toilet seat, Patient to call for help with toileting needs, Stay With Me (per policy), Toilet paper/wipes in reach, Toileting schedule/hourly rounds, Urinal in reach    History of Falls Interventions: Bed/chair exit alarm, Consult care management for discharge planning, Room close to nurse's station, Utilize gait belt for transfer/ambulation, Assess for delayed presentation/identification of injury for 48 hrs (comment for end date)

## 2021-11-11 NOTE — PROGRESS NOTES
Ortho static with therapy. MD notified because of no recovery. 250 Bolus given and MD said to give patient time to recover. Will monitor. 1620: Rechecked vitals multiple times and BP recovered with map above 65. Patient alert and feeling better.

## 2021-11-11 NOTE — PROGRESS NOTES
Problem: Mobility Impaired (Adult and Pediatric)  Goal: *Acute Goals and Plan of Care (Insert Text)  Description: FUNCTIONAL STATUS PRIOR TO ADMISSION: Pt is homeless and reports he was functional at wheelchair level and was independent with transfers to/from the chair with SPT to/from various surfaces. HOME SUPPORT: homeless, no support per patient     Physical Therapy Goals  Initiated 11/11/2021  1. Patient will move from supine to sit and sit to supine , scoot up and down, and roll side to side in bed with minimal assistance/contact guard assist within 7 day(s). 2.  Patient will transfer from bed to chair and chair to bed with minimal assistance/contact guard assist using the least restrictive device within 7 day(s). 3.  Patient will perform sit to stand with moderate assistance  within 7 day(s). Outcome: Not Progressing Towards Goal   PHYSICAL THERAPY EVALUATION  Patient: Santhosh Lopes (79 y.o. male)  Date: 11/11/2021  Primary Diagnosis: PAD (peripheral artery disease) ([facility]) [I73.9]  Procedure(s) (LRB):  RIGHT AXILLO-FEMORAL BYPASS (Right) 2 Days Post-Op   Precautions:       ASSESSMENT  Based on the objective data described below, the patient presents with orthostatic hypotension, R groin pain, impaired mobility, mild resistance and agitation, self limiting behavior, fear of falling with mobility, and decreased activity tolerance. Pt required Mod a x 2 for bed mobility, requiring cues for sequencing and increase time for pt participate. Once pt seated eob, BP assessed and wnl. After pt sat approx 5 min and attempted to scoot without success, he was returned to supine with hypotension noted. No recover with supine and trendelenburg and RN alerted. Bolus ordered. Will follow pt in the acute setting to progress activity as tolerated. Current Level of Function Impacting Discharge (mobility/balance):  Mod  A x 2    Other factors to consider for discharge: homeless, uninsured, will need rehab Patient will benefit from skilled therapy intervention to address the above noted impairments. PLAN :  Recommendations and Planned Interventions: bed mobility training, transfer training, therapeutic exercises, patient and family training/education, and therapeutic activities      Frequency/Duration: Patient will be followed by physical therapy:  2 times a week to address goals. Recommendation for discharge: (in order for the patient to meet his/her long term goals)  To be determined: Rehab    This discharge recommendation:  A follow-up discussion with the attending provider and/or case management is planned    IF patient discharges home will need the following DME: to be determined (TBD)         SUBJECTIVE:   Patient stated pull me up    OBJECTIVE DATA SUMMARY:   HISTORY:    History reviewed. No pertinent past medical history. History reviewed. No pertinent surgical history. Personal factors and/or comorbidities impacting plan of care: orthostatic hypotension  Home Situation  Home Environment:  (pt is homeless)  Support Systems: No Support Systems  Patient Expects to be Discharged to[de-identified] Unknown  Current DME Used/Available at Home: Wheelchair    EXAMINATION/PRESENTATION/DECISION MAKING:   Critical Behavior:  Neurologic State: Alert  Orientation Level: Disoriented X4  Cognition: Follows commands     Hearing: Auditory  Auditory Impairment: None  Range Of Motion:  AROM: Generally decreased, functional                       Strength:    Strength: Generally decreased, functional            Functional Mobility:  Bed Mobility:  Rolling: Assist x2; Moderate assistance  Supine to Sit: Assist x2; Moderate assistance  Sit to Supine: Assist x2;  Moderate assistance        Balance:   Sitting: Impaired; High guard  Sitting - Static: Fair (occasional)  Sitting - Dynamic: Poor (constant support)        Pain Ratin/10 R groin    Activity Tolerance:   Fair, Poor, signs and symptoms of orthostatic hypotension and limited by pain    After treatment patient left in no apparent distress:   Supine in bed, Call bell within reach and Side rails x 3    COMMUNICATION/EDUCATION:   The patients plan of care was discussed with: Occupational therapist and Registered nurse. Fall prevention education was provided and the patient/caregiver indicated understanding. and Patient understands intent and goals of therapy, but is neutral about his/her participation.     Thank you for this referral.  Irene Carranza, PT   Time Calculation: 30 mins

## 2021-11-11 NOTE — PROGRESS NOTES
Spiritual Care Assessment/Progress Note  [facility]      NAME: Avelina Barbour      MRN: [id number]  AGE: 77 y.o.  SEX: male  Restoration Affiliation: No preference   Language: English     11/11/2021     Total Time (in minutes): 15     Spiritual Assessment begun in [facility] 2 MED TELE through conversation with:         [x]Patient        [] Family    [] Friend(s)        Reason for Consult: Initial/Spiritual assessment, patient floor     Spiritual beliefs: (Please include comment if needed)     [] Identifies with a adebayo tradition:         [] Supported by a adebayo community:            [x] Claims no spiritual orientation:           [] Seeking spiritual identity:                [] Adheres to an individual form of spirituality:           [] Not able to assess:                           Identified resources for coping:      [] Prayer                               [] Music                  [] Guided Imagery     [] Family/friends                 [] Pet visits     [] Devotional reading                         [] Unknown     [x] Other:  Could not name a resource for coping                                         Interventions offered during this visit: (See comments for more details)    Patient Interventions: Affirmation of emotions/emotional suffering, Catharsis/review of pertinent events in supportive environment, Initial/Spiritual assessment, patient floor, Normalization of emotional/spiritual concerns           Plan of Care:     [] Support spiritual and/or cultural needs    [] Support AMD and/or advance care planning process      [] Support grieving process   [] Coordinate Rites and/or Rituals    [] Coordination with community clergy   [x] No spiritual needs identified at this time   [] Detailed Plan of Care below (See Comments)  [] Make referral to Music Therapy  [] Make referral to Pet Therapy     [] Make referral to Addiction services  [] Make referral to [facility]  [] Make referral to Spiritual Care Partner  [] No future visits requested        [x] Contact Spiritual Care for further referrals     Comments:  Reviewed chart prior to visit on Med Tele for spiritual assessment. Patient was lying in bed appearing relaxed and calm. No family/friends present. Patient was very quiet and not very conversant. He would respond to inquiries with one or two words. He stated he has no family and no friends. He was unable to identify anything or anyone that serves to support him in any way, simply responding that he did not know. Validated expressed feeling difficulty of being alone. Provided bedside presence, offered words of encouragement and advised him of ongoing availability of pastoral support.        DELROY Rivera, [facility], Staff [address]    [address] Paging Service  [phone])

## 2021-11-11 NOTE — PROGRESS NOTES
Removed brasher at 1400.   1900- Patient has not voided and has no urge to void. No discomfort noted. Upon bladder scan noted 202cc.

## 2021-11-11 NOTE — PROGRESS NOTES
Bedside and Verbal shift change report given to [place], [place] (oncoming nurse) by Austin Duke RN (offgoing nurse). Report included the following information SBAR, Kardex, MAR, Accordion and Recent Results.

## 2021-11-11 NOTE — PROGRESS NOTES
Problem: Self Care Deficits Care Plan (Adult)  Goal: *Acute Goals and Plan of Care (Insert Text)  Description:   FUNCTIONAL STATUS PRIOR TO ADMISSION: Patient was independent for basic and instrumental ADLs. The patient was functional at the wheelchair level and was independent for transfers to the chair with SPT to various surfaces, otherwise wc bound and home less since June per patient. HOME SUPPORT: homeless, no support per patient    Occupational Therapy Goals  Initiated 11/11/2021  1. Patient will perform grooming with supervision/set-up within 7 day(s). 2.  Patient will perform bathing with supervision/set-up within 7 day(s). 3.  Patient will perform lower body dressing with supervision/set-up within 7 day(s). 4.  Patient will perform toilet transfers with supervision/set-up within 7 day(s). 5.  Patient will perform all aspects of toileting with supervision/set-up within 7 day(s). 6.  Patient will participate in upper extremity therapeutic exercise/activities with supervision/set-up for 10 minutes within 7 day(s). 7.  Patient will utilize energy conservation techniques during functional activities with verbal cues within 7 day(s). Outcome: Progressing Towards Goal   OCCUPATIONAL THERAPY EVALUATION  Patient: Rosa Anne (67 y.o. male)  Date: 11/11/2021  Primary Diagnosis: PAD (peripheral artery disease) ([facility]) [I73.9]  Procedure(s) (LRB):  RIGHT AXILLO-FEMORAL BYPASS (Right) 2 Days Post-Op   Precautions:        ASSESSMENT  Based on the objective data described below, the patient presents with decreased activity tolerance, orthostatic hypotension once EOB, mild resistance and agitation with self limiting behavior with fear of falling/with being upright s/p R femoral bypass and PMH of L AKA. Mod A x2 overall for bed mobility with increased cues for sequencing, increased time for patient participation boosting self and noted delayed command following.  No orthostatic initially once EOB however after ~5 minutes and attempted scoot, returned to supine with hypotension, no recovery with supine and trendelenburg, RN alerted and bolus ordered. Will follow 2x weekly and progress and increase as tolerated. Current Level of Function Impacting Discharge (ADLs/self-care): total A for all ADLs, unable to trial self feeding or grooming, impaired sitting balance, unable to tolerate standing attempt    Other factors to consider for discharge: homeless, uninsured, will need rehab     Patient will benefit from skilled therapy intervention to address the above noted impairments. PLAN :  Recommendations and Planned Interventions: self care training, functional mobility training, therapeutic exercise, balance training, therapeutic activities, endurance activities, and patient education    Frequency/Duration: Patient will be followed by occupational therapy 2 times a week to address goals. Recommendation for discharge: (in order for the patient to meet his/her long term goals)  Therapy up to 5 days/week in SNF setting    This discharge recommendation:  A follow-up discussion with the attending provider and/or case management is planned    IF patient discharges home will need the following DME: pending discharge, may need new wc cushion, BSC, shower chair, AE       SUBJECTIVE:   Patient stated I don't know but you better stop touching me.     OBJECTIVE DATA SUMMARY:   HISTORY:   History reviewed. No pertinent past medical history. History reviewed. No pertinent surgical history.     Expanded or extensive additional review of patient history:     Home Situation  Home Environment:  (pt is homeless)  Support Systems: No Support Systems  Patient Expects to be Discharged to[de-identified] Unknown  Current DME Used/Available at Home: Wheelchair    Hand dominance: Right    EXAMINATION OF PERFORMANCE DEFICITS:  Cognitive/Behavioral Status:  Neurologic State: Alert  Orientation Level: Disoriented X4  Cognition: Follows commands Skin: R leg/groin bandage    Edema: none    Hearing: Auditory  Auditory Impairment: None    Vision/Perceptual:                                     Range of Motion:  B UE  AROM: Generally decreased, functional                         Strength:  B UE  Strength: Generally decreased, functional                Coordination:     Fine Motor Skills-Upper: Left Intact; Right Intact         Tone & Sensation:  B UE NT                            Balance:  Sitting: Impaired; High guard  Sitting - Static: Fair (occasional)  Sitting - Dynamic: Poor (constant support)    Functional Mobility and Transfers for ADLs:  Bed Mobility:  Rolling: Assist x2; Moderate assistance  Supine to Sit: Assist x2; Moderate assistance  Sit to Supine: Assist x2; Moderate assistance    Transfers:       ADL Assessment:  Feeding: Total assistance (refused)    Oral Facial Hygiene/Grooming: Setup; Total assistance    Bathing: Total assistance    Upper Body Dressing: Total assistance    Lower Body Dressing: Total assistance    Toileting: Total assistance            Completed OT evaluation and ADLs seated EOB with mod  Ax2 and intermittent support for balance with close SBA. Educated on safety and endurance training with encouragement for full participation in ADLs while in hospital. Good understanding noted. ADL Intervention and task modifications:        Minimal participation due to orthostatic hypotension. Educated on bed mobility/safety, BP checks for hypotension. Patient instructed and indicated understanding the benefits of maintaining activity tolerance, functional mobility, and independence with self care tasks during acute stay  to ensure safe return home and to baseline. Encouraged patient to increase frequency and duration OOB, be out of bed for all meals, perform daily ADLs (as approved by RN/MD regarding bathing etc), and performing functional mobility to/from bathroom.                    Therapeutic Exercise:       Occupational Therapy Evaluation Charge Determination   History Examination Decision-Making   LOW Complexity : Brief history review  MEDIUM Complexity : 3-5 performance deficits relating to physical, cognitive , or psychosocial skils that result in activity limitations and / or participation restrictions MEDIUM Complexity : Patient may present with comorbidities that affect occupational performnce. Miniml to moderate modification of tasks or assistance (eg, physical or verbal ) with assesment(s) is necessary to enable patient to complete evaluation       Based on the above components, the patient evaluation is determined to be of the following complexity level: LOW   Pain Ratin/10 R LE while sitting    Activity Tolerance:   Poor, SpO2 stable on RA, and signs and symptoms of orthostatic hypotension    After treatment patient left in no apparent distress:    Supine in bed, Patient positioned in R sidelying for pressure relief, Call bell within reach, and Side rails x 3    COMMUNICATION/EDUCATION:   The patients plan of care was discussed with: Physical therapist and Registered nurse. Home safety education was provided and the patient/caregiver indicated understanding., Patient/family have participated as able in goal setting and plan of care. , and Patient/family agree to work toward stated goals and plan of care. This patients plan of care is appropriate for delegation to [facility].     Thank you for this referral.  Mila Phan, OT  Time Calculation: 30 mins

## 2021-11-11 NOTE — PROGRESS NOTES
Hospitalist Progress Note    NAME: Cheyanne Sotelo   :  1955   MRN:  [id number]   Room Number:  2142/01  @ [facility]       Interim Hospital Summary: 77 y.o. male whom presented on 2021 with      Assessment / Plan:    Anticipated discharge date :   Anticipated disposition : SNF, has been accepted   Barriers to discharge : medical stability, orthostatic hypotension       Orthostatic hypotension not POA  History of Hypertension POA  Hold antihypertensives. Administer 250cc fluid bolus  Limb elevation      Acute on chronic systolic CHF POA, resolved  Acute pulmonary edema POA,resolved  Chest x-ray shows evidence of mild to moderate pulmonary edema. 2D echo shows ejection fraction of 25 to 30% with moderate aortic valve regurgitation and also severe mitral valve regurgitation.     -Cardiology consulted - recommend no further diagnostics at this time. Recommend medical therapy. - continue aspirin, lipitor  - Holding antihypertensives due to orthostatic hypotension.         Mild troponin elevation POA  -Troponin peaked at 301 and is now trending down. Most likely type II non-STEMI  -Cardiology following-ischemia evaluation not indicated. 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      -Continue aspirin, Lipitor  -Wound care               Acute encephalopathy POA, resolved  Polysubstance abuse POA  Likely due to polysubstance abuse. UDS positive for cocaine and marijuana.       - CIWA monitoring  - Thiamine, folic acid     Incidental bilateral lung infiltrates infiltrates on CT abdomen  -SARS-CoV-2 negative.   Chest x-ray shows mild pulmonary edema        Diabetes mellitus type 2    Hemoglobin A1c 7.1 (H) 2021 02:55 AM        - Lispro correctional scale, FSG AC HS  - Consistent carb diet, hypoglycemia protocol.             Code Status: Full code  Surrogate Decision Maker: Unknown     DVT Prophylaxis: Heparin drip     Baseline: Patient is homeless     Body mass index is 25.1 kg/m². Subjective:     Chief Complaint / Reason for Physician Visit  \"I feel fine\". Discussed with RN events overnight. Review of Systems:  No fevers, chills, appetite change, cough, sputum production, shortness of breath, dyspnea on exertion, nausea, vomitting, diarrhea, constipation, chest pain, leg edema, abdominal pain, joint pain, rash, itching. Tolerating PT/OT. Tolerating diet. Objective:     VITALS:   Last 24hrs VS reviewed since prior progress note. Most recent are:  Patient Vitals for the past 24 hrs:   Temp Pulse Resp BP SpO2   11/11/21 0758 99.3 °F (37.4 °C) (!) 105 16 (!) 130/58 100 %   11/11/21 0349 99 °F (37.2 °C) (!) 107 18 127/67 100 %   11/10/21 2232 99.8 °F (37.7 °C) (!) 102 18 125/60 99 %   11/10/21 1947 100 °F (37.8 °C) (!) 112 16 130/63 100 %   11/10/21 1455 98.9 °F (37.2 °C) 97 16 (!) 105/47 97 %   11/10/21 1350    (!) 86/48    11/10/21 1058 99 °F (37.2 °C) (!) 108 12 (!) 97/48 100 %       Intake/Output Summary (Last 24 hours) at 11/11/2021 0850  Last data filed at 11/11/2021 3986  Gross per 24 hour   Intake 400 ml   Output 1725 ml   Net -1325 ml        PHYSICAL EXAM:  General: Alert, cooperative, no acute distress    EENT:  EOMI. Anicteric sclerae. MMM  Resp:  CTA bilaterally, no wheezing or rales. No accessory muscle use  CV:  Regular  rhythm,  normal S1/S2, no murmurs rubs gallops, No edema  GI:  Soft, Non distended, Non tender. +Bowel sounds  Neurologic:  Alert and oriented X 3, normal speech,   Psych:   Good insight. Not anxious nor agitated  Skin:  No rashes.   No jaundice    Reviewed most current lab test results and cultures  YES  Reviewed most current radiology test results   YES  Review and summation of old records today    NO  Reviewed patient's current orders and [facility] YES  PMH/SH reviewed - no change compared to H&P  ________________________________________________________________________  Care Plan discussed with:    Comments   Patient x    Family      RN x    Care Manager x    Consultant                       x Multidiciplinary team rounds were held today with , nursing, pharmacist and clinical coordinator. Patient's plan of care was discussed; medications were reviewed and discharge planning was addressed. ________________________________________________________________________  Total NON critical care TIME: 25  Minutes    Total CRITICAL CARE TIME Spent:   Minutes non procedure based      Comments   >50% of visit spent in counseling and coordination of care x    ________________________________________________________________________  Jeff Paredes MD     Procedures: see electronic medical records for all procedures/Xrays and details which were not copied into this note but were reviewed prior to creation of Plan. LABS:  I reviewed today's most current labs and imaging studies.   Pertinent labs include:  Recent Labs     11/11/21  0142 11/10/21  0355 11/09/21  0630   WBC 10.6 10.3 7.1   HGB 8.7* 10.6* 13.1   HCT 26.5* 33.2* 39.5    195 237     Recent Labs     11/11/21  0142 11/10/21  0355 11/09/21  0630   * 134* 132*   K 3.3* 4.2 3.8   CL 94* 100 96*   CO2 26 26 28   * 145* 173*   BUN 9 7 10   CREA 0.76 0.81 0.80   CA 8.1* 8.5 9.2   MG 1.2*  --   --    PHOS 2.1*  --   --    ALB 2.1*  --   --    TBILI 0.5  --   --    ALT 10*  --   --        Signed: Jeff Paredes MD

## 2021-11-11 NOTE — OP NOTES
[address]  OPERATIVE REPORT    Name:  Shiv Cope  MR#:  [id number]  :  1955  ACCOUNT #:  [de-identified]  DATE OF SERVICE:  2021    PREOPERATIVE DIAGNOSIS:  Critical limb ischemia, right leg. POSTOPERATIVE DIAGNOSIS:  Critical limb ischemia, right leg. PROCEDURES PERFORMED:  1. Right axillary to above-knee popliteal bypass with 8 mm ringed Propaten. 2.  Reimplantation right profunda as crural patch. SURGEON:  Federico Ross MD    ANESTHESIA:  General.    SPECIMENS REMOVED:  None. ESTIMATED BLOOD LOSS:  150 mL. INDICATIONS:  The patient is a complicated 12-YXED-KQZ with longstanding advanced atherosclerosis, who has had a failed left axillobifemoral reconstruction and a prior left above-knee amputation. He presented with intractable ischemic rest pain and nonhealing wounds of his right foot and was found to have critical limb-threatening ischemia on the right. He has aortic occlusion at the level of the renal arteries and does not reconstitute a named blood vessel until the above-knee popliteal level on the right. There is reconstitution of some profunda femoral branches on the right, but the right common femoral, main profunda trunk and superficial femoral arteries are all occluded in the right groin. PROCEDURE:  After informed consent, the patient supine on the operating table, after adequate induction of general anesthesia, site and patient confirmation, administration of prophylactic antibiotics, the patient's right neck, chest, abdomen, right groin, entire right leg and foot were prepped and draped in usual sterile fashion. Attention was first turned to the right groin where the previous vertical groin incision was reentered. The nonfunctional femoral crossover graft peña was dissected free as was the external iliac, common femoral, superficial femoral and profunda femoral arteries. The old nonfunctional PTFE graft was excised and discarded. There was no flow within the lumen of the femoral artery. Attention was turned to the above-knee popliteal space where a longitudinal incision was made just above the knee and the above-knee popliteal artery dissected free, controlled with Silastic vessel loops and deemed suitable for outflow anastomosis. Attention was then turned to the right chest where an infraclavicular incision was made, deepened until the muscles of pectoralis major were identified and divided. Pectoralis minor was retracted laterally and dissection deepened until the subclavian vein and axillary artery were identified. The artery was dissected free over an adequate length and controlled with Silastic vessel loops. The patient was systemically heparinized. An 8 x 100 ringed heparin-bonded prosthesis (Propaten) was chosen and anastomosed end-to-side to the axillary artery using continuous Cameron-Ivan suture. After ensuring hemostasis to the anastomosis, the graft was then tunneled beneath the pectoralis minor and down the right chest wall in the mid to anterior axillary line using 3 counterincisions. It was then delivered into the right groin dissection and with the use of a single long tunneler was delivered into the popliteal dissection. The outflow anastomosis was constructed between the appropriately fashioned graft and the above-knee popliteal artery using continuous Cameron-Ivan suture technique. The graft was left ever so slightly redundant in the right groin to allow for a little lateral mobility or play. At the completion of the distal anastomosis, the suture line was hemostatic, and there was excellent flow in the outflow popliteal artery as evidenced by a palpable pulse and excellent low-resistance Doppler signal.    Attention was now returned to the right groin. The profunda femoris was dissected down to its first several branches until a patent lumen with backbleeding was identified.   This was mobilized by dividing several of the smallest and most superficial branches which had no backbleeding and was mobilized superiorly and medially as a crural patch. With temporary proximal and distal occlusion of the PTFE graft in the groin, several rings were removed, and graftotomy was created posterolaterally on the graft and the crural patch anastomosed to the PTFE graft with a continuous Wendover-Ivan suture technique. At release of these clamps, there was excellent flow into the profunda femoris as evidenced by a palpable pulse and an excellent low-resistance Doppler signal.  At this point, all wounds were copiously irrigated with antibiotic solution and closed in multiple layers of Vicryl suture and skin staples. The counterincisions were closed with interrupted 5-0 nylon sutures. The patient tolerated the procedure well with no complications, was extubated in the operating room and returned to the recovery room in good fashion where he was found to have a palpable posterior tibial pulse on the right.       Dee Watson MD      [id number]  D:  11/11/2021 8:44  T:  11/11/2021 18:41  JOB #:  [id number]  CC:  Peace Yap MD

## 2021-11-12 NOTE — DISCHARGE SUMMARY
Hospitalist Discharge Summary     Patient ID:  Yumiko Dennis  [id number]  59 y.o.  1955    PCP on record: None    Admit date: 11/7/2021  Discharge date and time: 11/12/2021      Admission Diagnoses: PAD (peripheral artery disease) ([facility] [I73.9]    Discharge Diagnoses: Active Problems:    PAD (peripheral artery disease) ([facility] (11/7/2021)           Hospital Course:       Acute on chronic systolic CHF POA, resolved  Acute pulmonary edema POA,resolved  Chest x-ray shows evidence of mild to moderate pulmonary edema. 2D echo shows ejection fraction of 25 to 30% with moderate aortic valve regurgitation and also severe mitral valve regurgitation.   Cardiology consulted - recommend no further diagnostics at this time. Recommend medical therapy. - Holding antihypertensives due to orthostatic hypotension.   - continue aspirin, lipitor          Mild troponin elevation POA  -Troponin peaked at 301 and is now trending down. Most likely type II non-STEMI  -Cardiology following-ischemia evaluation not indicated. 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   -Continue aspirin, Lipitor  -Wound care   -Complete oral antibiotics.             Acute encephalopathy POA, resolved  Polysubstance abuse POA  Likely due to polysubstance abuse. UDS positive for cocaine and marijuana.       - CIWA monitoring  - Thiamine, folic acid     Incidental bilateral lung infiltrates infiltrates on CT abdomen  -SARS-CoV-2 negative.   Chest x-ray shows mild pulmonary edema        Diabetes mellitus type 2              Hemoglobin A1c     7.1 (H)      11/08/2021 02:55 AM          CONSULTATIONS:  IP CONSULT TO VASCULAR SURGERY  IP CONSULT TO CARDIOLOGY    Excerpted HPI from H&P of Reddy Mayers MD:     77 y.o.   male who presents with past medical's of hypertension, diabetes mellitus is coming the hospital chief complaints of right leg pain. Patient is a poor historian so information is limited. He reports that he started having right leg pain since the last 1 week or so. He does not clearly describe the nature of pain. He does not answer my questions appropriately and goes back to sleep. Information is limited secondary to his drowsiness.     On arrival to ED, he was noted to be tachycardic. On labs CBC was normal.  BMP was normal.  LFTs are normal.  He had a CTA which shows evidence of acute occlusion of infrarenal abdominal aorta with occlusion of nearly all lower extremity results. Vascular surgery was consulted and the recommended heparin drip and keeping the patient NPO.     We were asked to admit for work up and evaluation of the above problems.      Past medical history  Hypertension  Diabetes mellitus  Homeless     Past surgical history  Unknown     ______________________________________________________________________  DISCHARGE SUMMARY/HOSPITAL COURSE:  for full details see H&P, daily progress notes, labs, consult notes. Visit Vitals  BP (!) 108/41   Pulse (!) 110   Temp 98.2 °F (36.8 °C)   Resp 16   Ht 5' 9\" (1.753 m)   Wt 77.1 kg (170 lb)   SpO2 100%   BMI 25.10 kg/m²       _______________________________________________________________________  Patient seen and examined by me on discharge day. Pertinent Findings:  Gen:    Not in distress  Chest: Clear lungs  CVS:   Regular rhythm. No edema  Abd:  Soft, not distended, not tender  Neuro:  Alert with good insight. Oriented to person, place, and time   _______________________________________________________________________  DISCHARGE MEDICATIONS:   Current Discharge Medication List      START taking these medications    Details   cephALEXin (Keflex) 500 mg capsule Take 1 Capsule by mouth four (4) times daily for 2 days.   Qty: 8 Capsule, Refills: 0  Start date: 11/12/2021, End date: 11/14/2021         CONTINUE these medications which have NOT CHANGED    Details   aspirin delayed-release 81 mg tablet Take 81 mg by mouth daily. metFORMIN ER (GLUCOPHAGE XR) 500 mg tablet Take 1,000 mg by mouth two (2) times daily (with meals). atorvastatin (LIPITOR) 80 mg tablet Take 80 mg by mouth daily. nicotine (NICODERM CQ) 21 mg/24 hr 1 Patch by TransDERmal route every twenty-four (24) hours. STOP taking these medications       isosorbide mononitrate ER (IMDUR) 30 mg tablet Comments:   Reason for Stopping:         spironolactone (ALDACTONE) 25 mg tablet Comments:   Reason for Stopping:         losartan (COZAAR) 25 mg tablet Comments:   Reason for Stopping:         nitroglycerin (NITROSTAT) 0.4 mg SL tablet Comments:   Reason for Stopping:         metoprolol succinate (TOPROL-XL) 25 mg XL tablet Comments:   Reason for Stopping:         furosemide (LASIX) 40 mg tablet Comments:   Reason for Stopping:               My Recommended Diet, Activity, Wound Care, and follow-up labs are listed in the patient's Discharge Insturctions which I have personally completed and reviewed.     _______________________________________________________________________  DISPOSITION:     Home with Family:    Home with HH/PT/OT/RN:    QUEENIE/LTC: x   CODI:    OTHER:        Condition at Discharge:  Stable  _______________________________________________________________________  Follow up with:   PCP : None  Follow-up Information    None             Total time in minutes spent coordinating this discharge (includes going over instructions, follow-up, prescriptions, and preparing report for sign off to her PCP) :  40 minutes    Signed:  Rosalba Longoria MD

## 2021-11-12 NOTE — PROGRESS NOTES
Progress Note      11/12/2021 8:08 AM  NAME: Ritchie Cody   MRN:  [id number]   Admit Diagnosis: PAD (peripheral artery disease) [facility]) [I73.9]      Primary Cardiologist: ERIC  Physician Requesting consult: Dr Remigio Santos       Assessment:    Problem list:   PVD, S/p AKA, h/o left ax-fem and  fem-fem bypass > with occlusion of bypass and distal infrarenal aorta and threatened RLE ischemia on 11/8/2021. S/p Right axillary=>AKpopliteal bypass w PTFE and Right profunda femoris carrel patch to PTFE graft at the right groin on 11/9/21  for RLE CLTI under general anesthesia. Systolic heart failure with EF 25%   Moderate to severe MR   Pulmonary edema on CXR   Minimal trop elevation - is type 2 with CMP and cocaine use, no chest pain   HTN  DM  Polysubstance use, Cocaine and marijuvana +  Smoking   Homeless    Poor historian - not able to tell me if he has any cardiac testing or diagnosis          Recommendations:    No cardiac symptoms at present, s/p vascular surgery   Has low BP with metoprolol, orthostatic - okay to hold   HF medications will be added in future if BP tolerates   Likely has CAD - Cont aspirin and statin   EKG is abn but no cardiac symptoms, trop borderline, no previous EKG to compare with - cont medical therapy for now - awaiting records from [address] [place] [address]   will need ischemic work up if never done before and repeat echo in 3 months on GDMT as OP   Need to stop smoking, and Cocaine and marijuvana use   Social work/ case management consult       Thank you for this consult and allowing me to take part in this patients care. Please call with questions.   Call on call physician over weekend if needed         [x]        High complexity decision making was performed        Subjective:     HPI:   no CP or sob   Report pain at surgery site     ROS: No CP, SOB, Abd pain, nausea, vomiting, syncope, palpitations, new focal neurological symptoms     Objective:      Physical Exam:    Last 24hrs VS reviewed since prior progress note. Most recent are:    Visit Vitals  BP (!) 122/55   Pulse (!) 101   Temp 98.1 °F (36.7 °C)   Resp 16   Ht 5' 9\" (1.753 m)   Wt 77.1 kg (170 lb)   SpO2 100%   BMI 25.10 kg/m²       Intake/Output Summary (Last 24 hours) at 11/12/2021 0809  Last data filed at 11/11/2021 1610  Gross per 24 hour   Intake    Output 400 ml   Net -400 ml          Examination:      General: Alert + Oriented x3, no acute distress   HEENT: Normocephalic aromatic, MMM   Neck: Supple, JVP- not well appreciated   RS: Non labored, clear   CVS: Regular rate and rhythm, S1S2, SM  Abd: Soft, non tender, non distended   Lower extremity: no edema, Lt amputation, right side ischemic changes   Skin: Warm and dry   CNS: Oriented x3, no focal neuro deficit      Data Review    Telemetry: normal sinus rhythm        Lab Data Personally Reviewed:    Recent Labs     11/12/21 0320 11/11/21 0142   WBC 15.6* 10.6   HGB 8.2* 8.7*   HCT 24.0* 26.5*    163     No results for input(s): INR, PTP, APTT, INREXT, INREXT in the last 72 hours. Recent Labs     11/12/21  0320 11/11/21  0142 11/10/21  0355   * 130* 134*   K 3.8 3.3* 4.2   CL 95* 94* 100   CO2 26 26 26   BUN 9 9 7   CREA 0.57* 0.76 0.81   * 150* 145*   CA 8.1* 8.1* 8.5   MG 1.3* 1.2*  --      No results for input(s): CPK, CKNDX, TROIQ in the last 72 hours. No lab exists for component: CPKMB  Lab Results   Component Value Date/Time    Cholesterol, total 165 11/08/2021 02:55 AM    HDL Cholesterol 37 11/08/2021 02:55 AM    LDL, calculated 110.8 (H) 11/08/2021 02:55 AM    Triglyceride 86 11/08/2021 02:55 AM    CHOL/HDL Ratio 4.5 11/08/2021 02:55 AM       Recent Labs     11/12/21  0320 11/11/21  0142   AP 45 47   TP 5.3* 5.9*   ALB 1.8* 2.1*   GLOB 3.5 3.8     No results for input(s): PH, PCO2, PO2 in the last 72 hours.     Medications Personally Reviewed:    Current Facility-Administered Medications   Medication Dose Route Frequency    enoxaparin (LOVENOX) injection 40 mg 40 mg SubCUTAneous Q24H    morphine injection 2 mg  2 mg IntraVENous Q3H PRN    oxyCODONE-acetaminophen (PERCOCET) 5-325 mg per tablet 2 Tablet  2 Tablet Oral Q4H PRN    ceFAZolin (ANCEF) 2 g in sterile water (preservative free) 20 mL IV syringe  2 g IntraVENous Q8H    atorvastatin (LIPITOR) tablet 40 mg  40 mg Oral QHS    [Held by provider] isosorbide mononitrate ER (IMDUR) tablet 30 mg  30 mg Oral DAILY    [Held by provider] losartan (COZAAR) tablet 25 mg  25 mg Oral DAILY    nicotine (NICODERM CQ) 21 mg/24 hr patch 1 Patch  1 Patch TransDERmal Q24H    LORazepam (ATIVAN) injection 1 mg  1 mg IntraVENous Q4H PRN    sodium chloride (NS) flush 5-40 mL  5-40 mL IntraVENous Q8H    sodium chloride (NS) flush 5-40 mL  5-40 mL IntraVENous PRN    acetaminophen (TYLENOL) tablet 650 mg  650 mg Oral Q6H PRN    Or    acetaminophen (TYLENOL) suppository 650 mg  650 mg Rectal Q6H PRN    polyethylene glycol (MIRALAX) packet 17 g  17 g Oral DAILY PRN    ondansetron (ZOFRAN ODT) tablet 4 mg  4 mg Oral Q8H PRN    Or    ondansetron (ZOFRAN) injection 4 mg  4 mg IntraVENous Q6H PRN    aspirin chewable tablet 81 mg  81 mg Oral DAILY    insulin lispro (HUMALOG) injection   SubCUTAneous AC&HS    glucose chewable tablet 16 g  4 Tablet Oral PRN    dextrose (D50W) injection syrg 12.5-25 g  12.5-25 g IntraVENous PRN    glucagon (GLUCAGEN) injection 1 mg  1 mg IntraMUSCular PRN              Corinna Franz MD

## 2021-11-12 NOTE — PROGRESS NOTES
Transition of Care Plan:     RUR: 12% low   Disposition:  SNF transition to LTC   [facility]   Follow up appointments: new PCP  DME needed: Pt owns a wheel chair   Transportation at [address] chair van vs. BLS/[facility]  [place] or means to access home:  Pt is homeless       IM Medicare Letter: N/a   Is patient a BCPI-A Bundle:                      If yes, was Bundle Letter given?:     Caregiver Contact: inactive numbers on chart, pt declined providing a contact   Discharge Caregiver contacted prior to discharge? Late  Entry 11/11/21: [facility] and [facility] facility [address] [place] [address] has accepted Pt. CM received a follow-up call from [facility] at [facility] that they were unsuccessful in verifying if Pt's Medicare as active. CM spoke with liaison Maura Rojo  at [address] [place] [address] rehabilitation to confirm if they were able to verify Pt's Medicare. CM was informed that facility has verified Pt's Medicare as active. Both facilities requesting a LTSS assessment for LTC consideration. CM to complete LTSS assessment.        Jarek Suh, LEONA Cifuentes

## 2021-11-12 NOTE — PROGRESS NOTES
Vascular Surgery Progress Note  Wale Tom, AGACNP-BC     Admit Date: 2021   LOS: 5 days        Daily Progress Note: 2021    POD: 3 Days Post-Op    S/P: Procedure(s):  Right axillary=>AKpopliteal bypass w PTFE  Right profunda femoris carrel patch to PTFE graft, right groin. Subjective: Earl Hamilton is a 77 y.o. with PMHx of PAD (s/p L AKA) and DM who was found to have a left ax-fem and  fem-fem bypass occlusion with threatened RLE ischemia. He underwent a Right axillary=>AKpopliteal bypass w PTFE and Right profunda femoris carrel patch to PTFE graft at the right groin on 21  for RLE CLTI under general anesthesia. He tolerated the procedure well with no complications. He is POD3. There were no acute events overnight. While working with PT/OT he displayed orthostatic hypoTN. PT/OT recommending SNF. It looks like he will be going to SNF with transition to LTC. This morning Mr. Martha Cadena was awake and most pleasant. He had pain to his thigh dressing, otherwise he, \"feels good. \" He denies numbness, tingling, chest pain, nausea, vomiting, difficulty swallowing, headache, and dyspnea. Objective:     Vital signs  Temp (24hrs), Av.7 °F (37.1 °C), Min:97.3 °F (36.3 °C), Max:100.7 °F (38.2 °C)   No intake/output data recorded. 11/10 1901 -  0700  In: 100 [P.O.:100]  Out: 800 [Urine:800]    Visit Vitals  BP (!) 122/55   Pulse (!) 101   Temp 98.1 °F (36.7 °C)   Resp 16   Ht 5' 9\" (1.753 m)   Wt 77.1 kg (170 lb)   SpO2 100%   BMI 25.10 kg/m²    O2 Flow Rate (L/min): 2 l/min O2 Device: None (Room air)     Pain control  Pain Assessment  Pain Scale 1: Numeric (0 - 10)  Pain Intensity 1: 0  Pain Onset 1: 2 days  Pain Location 1: Leg  Pain Orientation 1: Left  Pain Description 1: Aching  Pain Intervention(s) 1: Medication (see MAR)         Physical Exam  Vitals and nursing note reviewed. Constitutional:       General: He is not in acute distress.      Appearance: He is well-developed. He is not ill-appearing, toxic-appearing or diaphoretic. Cardiovascular:      Rate and Rhythm: Normal rate. Pulses:           Femoral pulses are 0 on the right side and 0 on the left side. Dorsalis pedis pulses are 0 on the right side. Posterior tibial pulses are 1+ on the right side. Pulmonary:      Effort: No respiratory distress. Chest:      Comments: Dressings taken down, staples and sutures intact, no drainage. Abdominal:      General: Abdomen is flat. There is no distension. Palpations: Abdomen is soft. Tenderness: There is no abdominal tenderness. There is no guarding. Musculoskeletal:      Right lower leg: No edema. Left lower leg: No edema. Left Lower Extremity: Left leg is amputated below knee. Feet:      Comments: Rt LLE: groin and medial thigh with staples CDI  Rt foot, movement intact with sensation. Sensitive to touch. Warm. Skin:     General: Skin is dry. Psychiatric:         Behavior: Behavior is cooperative.             24 hour results:    Recent Results (from the past 24 hour(s))   GLUCOSE, POC    Collection Time: 11/11/21 11:42 AM   Result Value Ref Range    Glucose (POC) 213 (H) 65 - 117 mg/dL    Performed by Glendy Life (TRV PCT)    COVID-19 RAPID TEST    Collection Time: 11/11/21  1:21 PM   Result Value Ref Range    Specimen source Nasopharyngeal      COVID-19 rapid test Not detected NOTD     GLUCOSE, POC    Collection Time: 11/11/21  4:15 PM   Result Value Ref Range    Glucose (POC) 177 (H) 65 - 117 mg/dL    Performed by Glendy Life (TRV PCT)    GLUCOSE, POC    Collection Time: 11/11/21 10:00 PM   Result Value Ref Range    Glucose (POC) 191 (H) 65 - 117 mg/dL    Performed by [employer]    CBC WITH AUTOMATED DIFF    Collection Time: 11/12/21  3:20 AM   Result Value Ref Range    WBC 15.6 (H) 4.1 - 11.1 K/uL    RBC 2.75 (L) 4.10 - 5.70 M/uL    HGB 8.2 (L) 12.1 - 17.0 g/dL    HCT 24.0 (L) 36.6 - 50.3 %    MCV 87.3 80.0 - 99.0 FL    MCH 29.8 26.0 - 34.0 PG    MCHC 34.2 30.0 - 36.5 g/dL    RDW 13.2 11.5 - 14.5 %    PLATELET 317 525 - 604 K/uL    MPV 11.3 8.9 - 12.9 FL    NRBC 0.0 0  WBC    ABSOLUTE NRBC 0.00 0.00 - 0.01 K/uL    NEUTROPHILS 78 (H) 32 - 75 %    LYMPHOCYTES 11 (L) 12 - 49 %    MONOCYTES 10 5 - 13 %    EOSINOPHILS 0 0 - 7 %    BASOPHILS 0 0 - 1 %    IMMATURE GRANULOCYTES 1 (H) 0.0 - 0.5 %    ABS. NEUTROPHILS 12.2 (H) 1.8 - 8.0 K/UL    ABS. LYMPHOCYTES 1.8 0.8 - 3.5 K/UL    ABS. MONOCYTES 1.5 (H) 0.0 - 1.0 K/UL    ABS. EOSINOPHILS 0.0 0.0 - 0.4 K/UL    ABS. BASOPHILS 0.0 0.0 - 0.1 K/UL    ABS. IMM. GRANS. 0.1 (H) 0.00 - 0.04 K/UL    DF AUTOMATED     METABOLIC PANEL, BASIC    Collection Time: 11/12/21  3:20 AM   Result Value Ref Range    Sodium 131 (L) 136 - 145 mmol/L    Potassium 3.8 3.5 - 5.1 mmol/L    Chloride 95 (L) 97 - 108 mmol/L    CO2 26 21 - 32 mmol/L    Anion gap 10 5 - 15 mmol/L    Glucose 146 (H) 65 - 100 mg/dL    BUN 9 6 - 20 MG/DL    Creatinine 0.57 (L) 0.70 - 1.30 MG/DL    BUN/Creatinine ratio 16 12 - 20      GFR est AA >60 >60 ml/min/1.73m2    GFR est non-AA >60 >60 ml/min/1.73m2    Calcium 8.1 (L) 8.5 - 10.1 MG/DL   MAGNESIUM    Collection Time: 11/12/21  3:20 AM   Result Value Ref Range    Magnesium 1.3 (L) 1.6 - 2.4 mg/dL   PHOSPHORUS    Collection Time: 11/12/21  3:20 AM   Result Value Ref Range    Phosphorus 1.9 (L) 2.6 - 4.7 MG/DL   HEPATIC FUNCTION PANEL    Collection Time: 11/12/21  3:20 AM   Result Value Ref Range    Protein, total 5.3 (L) 6.4 - 8.2 g/dL    Albumin 1.8 (L) 3.5 - 5.0 g/dL    Globulin 3.5 2.0 - 4.0 g/dL    A-G Ratio 0.5 (L) 1.1 - 2.2      Bilirubin, total 0.6 0.2 - 1.0 MG/DL    Bilirubin, direct 0.2 0.0 - 0.2 MG/DL    Alk.  phosphatase 45 45 - 117 U/L    AST (SGOT) 37 15 - 37 U/L    ALT (SGPT) 8 (L) 12 - 78 U/L   GLUCOSE, POC    Collection Time: 11/12/21  7:39 AM   Result Value Ref Range    Glucose (POC) 182 (H) 65 - 117 mg/dL    Performed by Niki Hay PCT Assessment/Plan:     Active Problems:    PAD (peripheral artery disease) ([facility] (11/7/2021)      Occlusion of infrarenal abdominal aorta  Right critical limb threatening ischemia with chronic ulcer  peripheral vascular disease s/p left AKA  - s/p Right axillary=>AKpopliteal bypass w PTFE, Right profunda femoris carrel patch to PTFE graft to right groin 11/9/21  - POD 3  - R axillary, left lateral chest/abd/leg staples/sutures intact  - R leg warm, well-perfused   - pain regimen  - DM diet  -  Pt/OT   - asa/statin  - leukocytosis  - ABX ancef     Acute pulmonary edema  CHF  - per xray 11/9/21: decreased pulmonary edema  - Echo 11/8/21:EF 25-30%  Elevated troponin  - 11/9/21 peaked at 301, trending down  - cardiology following  - abn ECG - asymptomatic, no intervention   - continue medical mgmt  - managed by primary team    Acute encephalopathy may be metabolic  Polysubstance abuse  - awake, oriented  - tox screen positive for THC and cocaine  - urine drug screen positive for THC and cocaine  - blood cx NG  - CIWA protocol  - managed by primary team     Electrolyte imbalance  - hypo Na, Mg, Ca, Ph  - hypokalemia, resolved  - asymptomatic  - ECG reviewed by cardiology- ABN, asymptomatic  -     Diabetes mellitus type 2  - HA1C; 7.1  - SSI  - managed by primary team     Hypertension  Orthostatic hypotension, new onset  - losartan/metoprolol - held  - managed by primary team    Tobacco abuse  - nicoderm patch  - smoking cessation counseling.       Activity: PT/OT  DVT ppx: enoxaparin 40mg subq  Dispo: SNF to LTC     Plan d/w Claudene Chasten, NP

## 2021-11-12 NOTE — PROGRESS NOTES
Hospital to [facility]                                                                        77 y.o.   male    [address] [facility]   Room: 2142/01    MRM 2 MED TELE  Unit Phone# :  [address]  MRM 2 MED TELE  [address] [facility]  [place] [id number]  Dept: [phone]  Loc: [phone]                    SITUATION     Admitted:  11/7/2021         Attending Provider:  Bettina Dunn MD       Consultations:  IP CONSULT TO VASCULAR SURGERY  IP CONSULT TO CARDIOLOGY    PCP:  None   None    Treatment Team: Attending Provider: Bettina Dunn MD; Consulting Provider: Holley Duncan MD; Consulting Provider: Leyla Tam MD; Care Manager: Daniel Garcia; Utilization Review: Arlyn Marte RN; Consulting Provider: Malia Reeves MD; Consulting Provider: Sadie Masterson MD; Primary Nurse: Carlos Aldrich; Staff Nurse: Maura Coto LPN    Admitting Dx:  [facility] [I73.9]       Principal Problem: <principal problem not specified>    3 Days Post-Op of   Procedure(s):  RIGHT AXILLO-FEMORAL BYPASS   BY: Leyla Tam MD             ON: 11/9/2021                  Code Status: Full Code                Advance Directives: No flowsheet data found. (Send w/patient)   Not Received       Isolation:  There are currently no Active Isolations       MDRO: No current active infections    Pain Medications given:  no        Special Equipment needed: yes  Type of equipment:         BACKGROUND     Allergies:  No Known Allergies    History reviewed. No pertinent past medical history. History reviewed. No pertinent surgical history. Medications Prior to Admission   Medication Sig    aspirin delayed-release 81 mg tablet Take 81 mg by mouth daily.  isosorbide mononitrate ER (IMDUR) 30 mg tablet Take 30 mg by mouth daily.     metFORMIN ER (GLUCOPHAGE XR) 500 mg tablet Take 1,000 mg by mouth two (2) times daily (with meals).  spironolactone (ALDACTONE) 25 mg tablet Take 12.5 mg by mouth two (2) times a day.  losartan (COZAAR) 25 mg tablet Take 25 mg by mouth daily.  atorvastatin (LIPITOR) 80 mg tablet Take 80 mg by mouth daily.  nitroglycerin (NITROSTAT) 0.4 mg SL tablet 0.4 mg by SubLINGual route every five (5) minutes as needed for Chest Pain. Up to 3 doses.  metoprolol succinate (TOPROL-XL) 25 mg XL tablet Take 25 mg by mouth daily.  furosemide (LASIX) 40 mg tablet Take 40 mg by mouth daily.  nicotine (NICODERM CQ) 21 mg/24 hr 1 Patch by TransDERmal route every twenty-four (24) hours. Hard scripts included in transfer packet yes    Vaccinations: There is no immunization history on file for this patient. Readmission Risks:    Known Risks: Fall        The Charlson CoMorbitiy Index tool is an evidenced based tool that has more automatic generated information. The tool looks at many different items such as the age of the patient, how many times they were admitted in the last calendar year, current length of stay in the hospital and their diagnosis. All of these items are pulled automatically from information documented in the chart from various places and will generate a score that predicts whether a patient is at low (less than 13), medium (13-20) or high (21 or greater) risk of being readmitted.         ASSESSMENT                Temp: 98.8 °F (37.1 °C) (11/12/21 1410) Pulse (Heart Rate): (!) 110 (11/12/21 1410)     Resp Rate: 16 (11/12/21 1410)           BP: (!) 139/56 (11/12/21 1410)     O2 Sat (%): 100 % (11/12/21 1410)     Weight: 77.1 kg (170 lb)    Height: 5' 9\" (175.3 cm) (11/09/21 1044)       If above not within 1 hour of discharge:    BP:_____  P:____  R:____ T:_____ O2 Sat: ___%  O2: ______    Active Orders   Diet    ADULT DIET Regular; 4 carb choices (60 gm/meal)         Orientation: only aware of  place and person     Active Behaviors: None                                   Active Lines/Drains:  (Peg Tube / [place] / CL or S/L?): no    Urinary Status: Voiding     Last BM: Last Bowel Movement Date: 11/09/21     Skin Integrity: Incision (comment), Wound (add Wound LDA)             Mobility: Very limited   Weight Bearing Status: NWB (Non Weight Bearing)                Lab Results   Component Value Date/Time    Glucose 146 (H) 11/12/2021 03:20 AM    Hemoglobin A1c 7.1 (H) 11/08/2021 02:55 AM    HGB 8.2 (L) 11/12/2021 03:20 AM    HGB 8.7 (L) 11/11/2021 01:42 AM        RECOMMENDATION     See After Visit Summary (AVS) for:  · Discharge instructions  · After [address]   · Special equipment needed (entered pre-discharge by Care Management)  · Medication Reconciliation    · Follow up Appointment(s)         Report given/sent by:  Chico Islas                    Verbal report given to: Alexis Cheng LPN  FAXED to:        Estimated discharge time:  11/12/2021 at 21 [phone]

## 2021-11-12 NOTE — PROGRESS NOTES
Transition of Care Plan:     RUR: 12% low   Disposition:  SNF transition to [facility]   Follow up appointments: new PCP  DME needed: Pt owns a wheel chair   Transportation at Discharge: [facility] at 2:15pm   [place] or means to access home:  Pt is homeless       IM Medicare Letter: N/a   Is patient a BCPI-A Bundle:                      If yes, was Bundle Letter given?:     Caregiver Contact: inactive numbers on chart, pt declined providing a contact   Discharge Caregiver contacted prior to discharge? Transition of Care Plan to SNF/Rehab    SNF/Rehab Transition:  Patient has been accepted to [facility] and meets criteria for admission. Patient will transported by [facility] and expected to leave at 2:15pm     Communication to Patient/Family:  Met with patient and they are agreeable to the transition plan. Communication to SNF/Rehab:  Bedside RN, has been notified to update the transition plan to the facility and   call report [phone]  Room 201A  Discharge information has been updated on the AVS.             Nursing Please include all hard scripts for controlled substances, med rec and dc summary, and AVS in packet. Reviewed and confirmed with facility, [address] can manage the patient care needs for the following:     [place] Records with (X) only those applicable:    Medication:  [x]  Medications will be available at the facility  [x]  IV Antibiotics ANCEF  []  Controlled Substance - hard copy to be sent with patient   []  Weekly Labs   Documents:  [] Hard RX  [x] MAR  [x] Kardex  [x] AVS  [x]Transfer Summary  [x]Discharge   Equipment:  []  CPAP/BiPAP  []  Wound Vacuum  []  [place] or Urinary Device  []  PICC/Central Line  []  Nebulizer  []  Ventilator   Treatment:  []Isolation (for MRSA, VRE, etc.)  []Surgical Drain Management  []Tracheostomy Care  []Dressing Changes  []Dialysis with transportation and chair time.   []PEG Care  []Oxygen  []Daily Weights for Heart Failure   Dietary:  []Any diet limitations  []Tube Feedings   []Total Parenteral Management (TPN)   Eligible for Medicaid Long Term Services and Supports  Yes:  [] Eligible for medical assistance or will become eligible within 180 days and UAI completed. [] Provider/Patient and/or support system has requested screening. [] UAI copy provided to patient or responsible party. .  [x] UAI unavailable at discharge will send once processed to SNF provider. [] UAI unavailable at discharged mailed to patient  No:   [] Private pay and is not financially eligible for Medicaid within the next 180 days. [] Reside out-of-state.   [] A residents of a state owned/operated facility that is licensed  by [place] [address] [employer] or [facility]  [] Enrollment in [facility] hospice services  [] [address] [place] [address] [facility]  [] Patient /Family declines to have screening completed or provide financial information for screening     Financial Resources:  Medicaid    [] Initiated and application pending   [] Full coverage     Advanced Care Plan:  []Surrogate Decision Maker of Care  []POA  [x]Communicated Code Status: FULL   Other

## 2021-12-01 NOTE — ADVANCED PRACTICE NURSE
Central line placement on 11/9/2021 and midazolam and fentanyl not documeted in chart. Addendum note added to chart with narcotics and midazolam added.

## 2021-12-01 NOTE — ADDENDUM NOTE
Addendum  created 12/01/21 1116 by Deyvi Min CRNA    Clinical Note Signed, Intraprocedure Meds edited, LDA properties accepted, Orders acknowledged in Narrator

## 2021-12-10 PROBLEM — R65.21 SEPTIC SHOCK (HCC): Status: ACTIVE | Noted: 2021-01-01

## 2021-12-10 PROBLEM — A41.9 SEPTIC SHOCK (HCC): Status: ACTIVE | Noted: 2021-01-01

## 2021-12-10 NOTE — PROGRESS NOTES
Consult for Vancomycin Dosing by Pharmacy by Dr. Harlan Johns provided for this 77y.o. year old male , for indication of bacteremia/sepsis. Day of Therapy: 1  Goal of Level(s): 10-15mcg/dL (YUNIOR)     Other Current Antibiotics: Zosyn and Levaquin    Significant Cultures: All Micro Results       Procedure Component Value Units Date/Time    COVID-19 RAPID TEST [[id number]] Collected: 12/10/21 1426    Order Status: Completed Specimen: Nasopharyngeal Updated: 12/10/21 1533     Specimen source Nasopharyngeal        COVID-19 rapid test Not Detected        Comment: Rapid Abbott ID Now   Rapid NAAT:  The specimen is NEGATIVE for SARS-CoV-2, the novel coronavirus associated with COVID-19. Negative results should be treated as presumptive and, if inconsistent with clinical signs and symptoms or necessary for patient management, should be tested with an alternative molecular assay. Negative results do not preclude SARS-CoV-2 infection and should not be used as the sole basis for patient management decisions. This test has been authorized by the FDA under   an Emergency Use Authorization (EUA) for use by authorized laboratories. Fact sheet for Healthcare Providers: ConventionUpdate.co.nz Fact sheet for Patients: ConventionUpdate.co.nz   Methodology: Isothermal Nucleic Acid Amplification         CULTURE, URINE [[id number]] Collected: 12/10/21 1426    Order Status: Completed Specimen: Urine Updated: 12/10/21 1507    CULTURE, BLOOD, PAIRED [[id number]] Collected: 12/10/21 1426    Order Status: Completed Specimen: Blood Updated: 12/10/21 1441            Serum Creatinine Creatinine   Date Value Ref Range Status   12/10/2021 2.94 (H) 0.70 - 1.30 mg/dL Final   11/12/2021 0.57 (L) 0.70 - 1.30 MG/DL Final   11/11/2021 0.76 0.70 - 1.30 MG/DL Final      Creatinine Clearance Estimated Creatinine Clearance: 24.2 mL/min (A) (based on SCr of 2.94 mg/dL (H)).    BUN Lab Results   Component Value Date/Time    BUN 90 (H) 12/10/2021 02:26 PM      WBC Lab Results   Component Value Date/Time    WBC 22.9 (H) 12/10/2021 02:26 PM      Temp Temp Readings from Last 1 Encounters:   12/10/21 97.5 °F (36.4 °C)      C-Reactive Protein No results found for: CRP   Procalcitonin Procalcitonin   Date Value Ref Range Status   11/08/2021 <0.05 ng/mL Final     Comment:          Suspected Sepsis:  <0.50 ng/mL     Low likelihood of sepsis. 0.50-2.00 ng/mL    Increased likelihood of sepsis. Antibiotics encouraged. >2.00 ng/mL  High risk of sepsis/shock. Antibiotics strongly encouraged. Suspected Lower Resp Tract Infections:  <0.24 ng/mL    Low likelihood of bacterial infection. >0.24 ng/mL    Increased likelihood of bacterial infection. Antibiotics encouraged. With successful antibiotic therapy, PCT levels should decrease rapidly. (Half-life of 24 to 36 hours)       Procalcitonin values from samples collected within the first 6 hours of systemic infection may still be low. Retesting may be indicated. Values from day 1 and day 4 can be entered into the Change in Procalcitonin Calculator (www.Morphys-pct-calculator. "[employer] to determine the patient's Mortality Risk Prognosis. In healthy neonates, plasma Procalcitonin (PCT) concentrations increase gradually after birth, reaching peak values at about 24 hours of age then decrease to normal values below 0.5 ng/mL by 48-72 hours of age. Ht Readings from Last 1 Encounters:   12/10/21 167.6 cm (66\")        Wt Readings from Last 1 Encounters:   12/10/21 77.1 kg (170 lb)     Ideal body weight: 63.8 kg (140 lb 10.5 oz)  Adjusted ideal body weight: 69.1 kg (152 lb 6.3 oz)     New Regimen:   Patient has YUNIOR. Give Vancomycin 2000mg IV x1 now. Pharmacy will order a random level tomorrow. Pharmacy to follow daily and will make changes to dose and/or frequency based on clinical status.      _________________________________     Pharmacist Mitra Paz

## 2021-12-10 NOTE — ED PROVIDER NOTES
EMERGENCY DEPARTMENT HISTORY AND PHYSICAL EXAM      Date: 12/10/2021  Patient Name: Jasiel Diaz    History of Presenting Illness     Chief Complaint   Patient presents with    Respiratory Distress       History Provided By: EMS and Nursing Home/SNF/Rehab Center    HPI: Jasiel Diaz, 77 y.o. male with a past medical history significant Diabetes, hypertension, severe peripheral artery disease presents to the ED with cc of respiratory distress. Patient was transferred from [facility] rehab center. Patient was discharged there from [facility] after undergoing femorofemoral axillary bypass surgery for arterial insufficiency. Patient baseline confused however as per nursing home patient lethargic and unresponsive with new. No note of any fevers chills. Was noted to be hypoxic at nursing home. GEN nonverbal, does not follow commands. There are no other complaints, changes, or physical findings at this time.     PCP: None    Current Facility-Administered Medications   Medication Dose Route Frequency Provider Last Rate Last Admin    sodium chloride (NS) flush 5-10 mL  5-10 mL IntraVENous PRN Holley Camejo MD        piperacillin-tazobactam (ZOSYN) 3.375 g in 0.9% sodium chloride (MBP/ADV) 100 mL MBP  3.375 g IntraVENous Q8H Shirley López MD   IV Completed at 12/10/21 1915    levoFLOXacin (LEVAQUIN) 750 mg in D5W IVPB  750 mg IntraVENous Q48H Shirley López MD   IV Completed at 12/10/21 1826    dextrose 5% 1,000 mL with sodium bicarbonate (8.4%) 100 mEq infusion   IntraVENous CONTINUOUS Shirley López  mL/hr at 12/10/21 2000 Rate Verify at 12/10/21 2000    enoxaparin (LOVENOX) injection 30 mg  30 mg SubCUTAneous Q24H Shirley López MD   30 mg at 12/10/21 1818    VANCOMYCIN INFORMATION NOTE   Other Rx Dosing/Monitoring Shirley López MD        vancomycin (VANCOCIN) 1,000 mg in 0.9% sodium chloride 250 mL (VIAL-MATE)  1,000 mg IntraVENous ONCE Shirley López MD Held at 12/10/21 1657    [START ON 12/11/2021] VANCOMYCIN RANDOM LAB REMINDER   Other ONCE Kanu Camejo MD        NOREPINephrine (LEVOPHED) 8 mg in 5% dextrose 250mL (32 mcg/mL) infusion  0.5-16 mcg/min IntraVENous TITRATE Shonda Armas MD 7.5 mL/hr at 12/10/21 2059 4 mcg/min at 12/10/21 2059       Past History     Past Medical History:  Past Medical History:   Diagnosis Date    Aortic insufficiency     Diabetes ([address]     Hypertension     Mitral insufficiency     PVD (peripheral vascular disease) ([address]     Systolic heart failure (HCC)     EF 25%       Past Surgical History:  Past Surgical History:   Procedure Laterality Date    HX ARTERIAL BYPASS      Right axillofemoral bypass November 2021       Family History:  Family History   Family history unknown: Yes       Social History:  Social History     Tobacco Use    Smoking status: Not on file    Smokeless tobacco: Not on file   Substance Use Topics    Alcohol use: Not on file    Drug use: Not on file       Allergies:  No Known Allergies      Review of Systems     Review of Systems   Unable to perform ROS: Mental status change       Physical Exam     Physical Exam  Vitals and nursing note reviewed. Constitutional:       Appearance: Normal appearance. He is normal weight. He is ill-appearing and toxic-appearing. HENT:      Head: Normocephalic and atraumatic. Nose: Nose normal.      Mouth/Throat:      Mouth: Mucous membranes are dry. Eyes:      Extraocular Movements: Extraocular movements intact. Pupils: Pupils are equal, round, and reactive to light. Cardiovascular:      Rate and Rhythm: Normal rate and regular rhythm. Heart sounds: Normal heart sounds. Pulmonary:      Effort: Pulmonary effort is normal.      Breath sounds: Normal breath sounds. No wheezing or rales. Abdominal:      General: Abdomen is flat. Bowel sounds are normal. There is no distension. Palpations: Abdomen is soft.       Comments: Abdomen shows vascular grafts subcutaneous   Musculoskeletal:         General: No swelling or tenderness. Normal range of motion. Cervical back: Normal range of motion and neck supple. Comments: Left AKA   Skin:     General: Skin is warm and dry. Capillary Refill: Capillary refill takes more than 3 seconds. Coloration: Skin is pale. Neurological:      Mental Status: He is alert. Cranial Nerves: No cranial nerve deficit. Sensory: No sensory deficit. Motor: No weakness. Comments: Patient nonverbal, does not follow commands, no obvious weakness in any extremity         Diagnostic Study Results     Labs -     Recent Results (from the past 12 hour(s))   TROPONIN-HIGH SENSITIVITY    Collection Time: 12/10/21  2:26 PM   Result Value Ref Range    Troponin-High Sensitivity 1,040 (HH) 0 - 76 ng/L   CBC WITH AUTOMATED DIFF    Collection Time: 12/10/21  2:26 PM   Result Value Ref Range    WBC 22.9 (H) 4.1 - 11.1 K/uL    RBC 3.27 (L) 4.10 - 5.70 M/uL    HGB 9.4 (L) 12.1 - 17.0 g/dL    HCT 29.3 (L) 36.6 - 50.3 %    MCV 89.6 80.0 - 99.0 FL    MCH 28.7 26.0 - 34.0 PG    MCHC 32.1 30.0 - 36.5 g/dL    RDW 16.0 (H) 11.5 - 14.5 %    PLATELET 387 (L) 034 - 400 K/uL    NRBC 1.4 (H) 0.0  WBC    ABSOLUTE NRBC 0.31 (H) 0.00 - 0.01 K/uL    NEUTROPHILS 82 (H) 32 - 75 %    LYMPHOCYTES 12 12 - 49 %    MONOCYTES 6 5 - 13 %    EOSINOPHILS 0 0 - 7 %    BASOPHILS 0 0 - 1 %    IMMATURE GRANULOCYTES 0 %    ABS. NEUTROPHILS 18.8 (H) 1.8 - 8.0 K/UL    ABS. LYMPHOCYTES 2.7 0.8 - 3.5 K/UL    ABS. MONOCYTES 1.4 (H) 0.0 - 1.0 K/UL    ABS. EOSINOPHILS 0.0 0.0 - 0.4 K/UL    ABS. BASOPHILS 0.0 0.0 - 0.1 K/UL    ABS. IMM.  GRANS. 0.0 K/UL    RBC COMMENTS [place] cells  1+        DF Manual     METABOLIC PANEL, COMPREHENSIVE    Collection Time: 12/10/21  2:26 PM   Result Value Ref Range    Sodium 131 (L) 136 - 145 mmol/L    Potassium 6.4 (H) 3.5 - 5.1 mmol/L    Chloride 96 (L) 97 - 108 mmol/L    CO2 10 (LL) 21 - 32 mmol/L    Anion gap 25 (H) 5 - 15 mmol/L    Glucose 164 (H) 65 - 100 mg/dL    BUN 90 (H) 6 - 20 mg/dL    Creatinine 2.94 (H) 0.70 - 1.30 mg/dL    BUN/Creatinine ratio 31 (H) 12 - 20      GFR est AA 26 (L) >60 ml/min/1.73m2    GFR est non-AA 22 (L) >60 ml/min/1.73m2    Calcium 8.6 8.5 - 10.1 mg/dL    Bilirubin, total 2.4 (H) 0.2 - 1.0 mg/dL    AST (SGOT) 1,542 (H) 15 - 37 U/L    ALT (SGPT) 971 (H) 12 - 78 U/L    Alk.  phosphatase 497 (H) 45 - 117 U/L    Protein, total 7.2 6.4 - 8.2 g/dL    Albumin 2.6 (L) 3.5 - 5.0 g/dL    Globulin 4.6 (H) 2.0 - 4.0 g/dL    A-G Ratio 0.6 (L) 1.1 - 2.2     LACTIC ACID    Collection Time: 12/10/21  2:26 PM   Result Value Ref Range    Lactic acid 11.6 (HH) 0.4 - 2.0 mmol/L   COVID-19 RAPID TEST    Collection Time: 12/10/21  2:26 PM   Result Value Ref Range    Specimen source Nasopharyngeal      COVID-19 rapid test Not Detected Not Detected     URINALYSIS W/ REFLEX CULTURE    Collection Time: 12/10/21  2:26 PM    Specimen: Urine   Result Value Ref Range    Color Dark Yellow      Appearance Turbid (A) Clear      Specific gravity 1.018 1.003 - 1.030      pH (UA) 5.0 5.0 - 8.0      Protein >300 (A) Negative mg/dL    Glucose 50 (A) Negative mg/dL    Ketone 5 (A) Negative mg/dL    Bilirubin Negative Negative      Blood Large (A) Negative      Urobilinogen 2.0 (H) 0.1 - 1.0 EU/dL    Nitrites Negative Negative      Leukocyte Esterase Negative Negative      WBC 20-50 0 - 4 /hpf    RBC 5-10 0 - 5 /hpf    Bacteria Negative Negative /hpf    UA:UC IF INDICATED Urine Culture Ordered (A) Culture not indicated by UA result      Mucus Trace (A) Negative /lpf    Hyaline cast 2-5 0 - 5 /lpf   BLOOD GAS, ARTERIAL    Collection Time: 12/10/21  3:02 PM   Result Value Ref Range    pH 7.38 7.35 - 7.45      PCO2 11 (L) 35 - 45 mmHg    PO2 214 (H) 75 - 100 mmHg    O2  >95 %    BICARBONATE 12 (L) 22 - 26 mmol/L    BASE DEFICIT 16.6 (H) 0 - 2 mmol/L    O2 FLOW RATE 15.0 L/min    FIO2 100.0 %    EPAP/CPAP/PEEP 0      SITE Right Radial      LEROY'S TEST PASS     LACTIC ACID    Collection Time: 12/10/21  3:56 PM   Result Value Ref Range    Lactic acid 12.5 (HH) 0.4 - 2.0 mmol/L   METABOLIC PANEL, BASIC    Collection Time: 12/10/21  4:35 PM   Result Value Ref Range    Sodium 132 (L) 136 - 145 mmol/L    Potassium 6.6 (HH) 3.5 - 5.1 mmol/L    Chloride 96 (L) 97 - 108 mmol/L    CO2 10 (LL) 21 - 32 mmol/L    Anion gap 26 (H) 5 - 15 mmol/L    Glucose 144 (H) 65 - 100 mg/dL    BUN 92 (H) 6 - 20 mg/dL    Creatinine 2.91 (H) 0.70 - 1.30 mg/dL    BUN/Creatinine ratio 32 (H) 12 - 20      GFR est AA 26 (L) >60 ml/min/1.73m2    GFR est non-AA 22 (L) >60 ml/min/1.73m2    Calcium 8.2 (L) 8.5 - 10.1 mg/dL   PROCALCITONIN    Collection Time: 12/10/21  5:03 PM   Result Value Ref Range    Procalcitonin 4.55 (H) 0 ng/mL   METABOLIC PANEL, BASIC    Collection Time: 12/10/21  7:17 PM   Result Value Ref Range    Sodium 134 (L) 136 - 145 mmol/L    Potassium 5.7 (H) 3.5 - 5.1 mmol/L    Chloride 100 97 - 108 mmol/L    CO2 12 (LL) 21 - 32 mmol/L    Anion gap 22 (H) 5 - 15 mmol/L    Glucose 148 (H) 65 - 100 mg/dL    BUN 93 (H) 6 - 20 mg/dL    Creatinine 2.98 (H) 0.70 - 1.30 mg/dL    BUN/Creatinine ratio 31 (H) 12 - 20      GFR est AA 26 (L) >60 ml/min/1.73m2    GFR est non-AA 21 (L) >60 ml/min/1.73m2    Calcium 7.1 (L) 8.5 - 10.1 mg/dL   TROPONIN-HIGH SENSITIVITY    Collection Time: 12/10/21  7:17 PM   Result Value Ref Range    Troponin-High Sensitivity 1,591 (HH) 0 - 76 ng/L   LACTIC ACID    Collection Time: 12/10/21  7:17 PM   Result Value Ref Range    Lactic acid 12.3 (HH) 0.4 - 2.0 mmol/L       Radiologic Studies -   [unfilled]  CT Results  (Last 48 hours)               12/10/21 1607  CT HEAD WO CONT Final result    Impression:  No change compared to CT head November 7, 2021. Same distribution   left cerebral encephalomalacia. Narrative:  CT head. Comparison CT abdomen November 7, 2021.        Axial images are reviewed along with reformatted sagittal/coronal images. Dose reduction: All CT scans at this facility are performed using dose reduction   optimization techniques as appropriate to a performed exam including the   following-   automated exposure control, adjustments of mA and/or Kv according to patient   size, or use of iterative reconstructive technique. Bone windows demonstrate normal aeration sinuses and mastoid air cells. Review of intracranial content reveals geographic encephalomalacia left   frontoparietal cerebrum. There are smaller sites more focal encephalomalacia   noted through left parietal/parieto-occipital cerebrum. Noted compensatory   enlargement frontal horn left lateral ventricle. Findings are similar appearance   compared to prior imaging. No hydrocephalus. No intracranial hemorrhage. CXR Results  (Last 48 hours)               12/10/21 1441  XR CHEST PORT Final result    Narrative:  Chest single view. Comparison single view chest November 9, 2021. Unchanged appearance for the lungs; no gross interstitial or alveolar pulmonary   edema. Sternal wires in the midline related for intrathoracic surgery. Cardiac   and mediastinal structures unchanged. No pneumothorax or sizable pleural   effusion. Medical Decision Making and ED Course   I am the first provider for this patient. I reviewed the vital signs, available nursing notes, past medical history, past surgical history, family history and social history. Vital Signs-Reviewed the patient's vital signs.   Patient Vitals for the past 12 hrs:   Temp Pulse Resp BP SpO2   12/10/21 2100 (!) 96.1 °F (35.6 °C) 76 (!) 32 (!) 84/50    12/10/21 2020    (!) 93/44    12/10/21 2000 (!) 95.4 °F (35.2 °C) 74 25 (!) 66/55 100 %   12/10/21 1908  74 20 (!) 90/57 100 %   12/10/21 1903  76 19 (!) 90/57 96 %   12/10/21 1900     98 %   12/10/21 1836 (!) 94.8 °F (34.9 °C) 73 18 (!) 87/43 98 %   12/10/21 1817  78 18     12/10/21 1701  79 20 106/71 99 %   12/10/21 1633  83 24 108/89 100 %   12/10/21 1549  73 29 (!) 86/75 100 %   12/10/21 1522  75 27 (!) 99/49 95 %   12/10/21 1458  82 30 111/72 100 %   12/10/21 1405 97.5 °F (36.4 °C) 89  (!) 105/51 100 %   12/10/21 1354  80 (!) 32 102/63 100 %       EKG interpretation: (Preliminary)  Normal sinus rhythm 78, right bundle branch block, no obvious ST elevation      Records Reviewed: Nursing Notes and Old Medical Records    The patient presents with hypoxia, altered mental status with a differential diagnosis of pneumonia, sepsis, UTI, aspiration, COVID-19      Provider Notes (Medical Decision Making):     MDM   68-year-old male, history of hypertension, significant peripheral artery disease was recently discharged from [facility] after underwent bypass graft surgery. Patient was at [facility] rehab center, was noted to be less responsive and hypoxic. On arrival patient tachypneic, minimally responsive, cool extremities however normotensive, saturations 95% on 10 L nonrebreather. We will draw basic lab work including septic work-up, administer IV fluids, at this time do not feel the patient requires intubation, protecting airway saturations stable with oxygen support. ED Course:   Initial assessment performed. The patients presenting problems have been discussed, and they are in agreement with the care plan formulated and outlined with them. I have encouraged them to ask questions as they arise throughout their visit. ED Course as of 12/10/21 2123   Fri Dec 10, 2021   1521 Patient's lab work resulting, significant for severe metabolic derangements, patient's potassium 6.4, bicarb 10, BUN 90 creatinine 2.94 troponin elevated to 1000, lactic acid elevated 11.6. White count elevated 22.9. We will order calcium, bicarb, broad-spectrum antibiotics.   Attempted to contact patient's family as patient has severe abnormalities consistent with a very dire prognosis. Elevated lactate concerning for may be an ischemic event patient did have recent significant vascular surgeries, patient's extremities all have poor cap refill but may be general state of shock, no pale cool limb noted. [PZ]   1553 Attempted to contact patient's daughter and son as listed on chart, both are wrong numbers. [PZ]   1620 Remains tachypneic however feel that tachypnea related to metabolic acidosis, patient maintaining his saturations on nonrebreather will decrease to nasal cannula, at this time will hold off on intubation. [PZ]   1620 Discussed case with Dr. Eryn Viveros, will admit patient for severe sepsis, NSTEMI [PZ]      ED Course User Index  [PZ] Jacob Chamberlain MD         Procedures       Marilyn Oscar MD  Procedures               CRITICAL CARE NOTE :  3:06 PM  Amount of Critical Care Time: __60__(minutes)__    IMPENDING DETERIORATION -Airway, Respiratory and Cardiovascular  ASSOCIATED RISK FACTORS - Hypotension, Shock and Hypoxia  MANAGEMENT- Bedside Assessment  INTERPRETATION -  Xrays, Blood Gases, ECG and Blood Pressure  INTERVENTIONS - hemodynamic mngmt and vascular control  CASE REVIEW - Hospitalist/Intensivist  TREATMENT RESPONSE -Stable  PERFORMED BY - Self    NOTES   :  I have spent critical care time involved in lab review, consultations with specialist, family decision- making, bedside attention and documentation. This time excludes time spent in any separate billed procedures. During this entire length of time I was immediately available to the patient . Marilyn Oscar MD        Disposition       Admitted      Diagnosis     Clinical Impression:   1. Severe sepsis ([facility]    2. NSTEMI (non-ST elevated myocardial infarction) ([facility]    3. Acute renal insufficiency    4. Acute hyperkalemia        Attestations:    Marilyn Oscar MD    Please note that this dictation was completed with [employer], the [employer] voice recognition software.   Quite often unanticipated grammatical, syntax, homophones, and other interpretive errors are inadvertently transcribed by the computer software. Please disregard these errors. Please excuse any errors that have escaped final proofreading. Thank you.

## 2021-12-10 NOTE — H&P
History & Physical    Primary Care Provider: None  Source of Information: Patient/family     History of Presenting Illness:   Junior Jones is a 77 y.o. male with severe peripheral vascular disease, systolic heart failure, diabetes and hypertension. He was admitted to [facility] in mid November and found to have an infrarenal abdominal aortic occlusion and ended up undergoing a right axillofemoral bypass procedure. Echo during that stay showed an EF of 25% along with severe mitral insufficiency and moderate aortic insufficiency. He was discharged to a nursing home on 11/18    Patient presents to the ED today from [facility] with hypoxia and altered mental status. Pressure was 85/60 on admission with a respiratory rate of 33 and a heart rate of 80. Labs reveal hemoglobin of 9.4, platelets 622, WBC 23, sodium 131, potassium 6.4, CO2 10, creatinine 2.9, BUN 90 troponin 1000, lactate 11.6, AST 1542, . ABG showed pH 7.38, PCO2 11, PO2 214    He has received 1.5 L of fluid in the ED so far with some improvement in his blood pressure    Chest x-ray was grossly clear     Patient is homeless. Was smoking prior to his recent hospitalization       Review of Systems:  Review of systems not obtained due to patient factors. Past Medical History:   Diagnosis Date    Aortic insufficiency     Diabetes ([address]     Hypertension     Mitral insufficiency     PVD (peripheral vascular disease) (HCC)     Systolic heart failure ([facility])     EF 25%        Past Surgical History:   Procedure Laterality Date    HX ARTERIAL BYPASS      Right axillofemoral bypass November 2021   History of left BKA    Prior to Admission medications    Medication Sig Start Date End Date Taking? Authorizing Provider   aspirin delayed-release 81 mg tablet Take 81 mg by mouth daily.     Provider, Historical   metFORMIN ER (GLUCOPHAGE XR) 500 mg tablet Take 1,000 mg by mouth two (2) times daily (with meals). Provider, Historical   atorvastatin (LIPITOR) 80 mg tablet Take 80 mg by mouth daily. Provider, Historical   nicotine (NICODERM CQ) 21 mg/24 hr 1 Patch by TransDERmal route every twenty-four (24) hours. Provider, Historical       No Known Allergies     Family History   Family history unknown: Yes      Unknown family history    Social History     Socioeconomic History    Marital status: SINGLE      History of smoking, unknown details      CODE STATUS:  DNR    Full    Other      Objective:     Physical Exam:     Visit Vitals  BP (!) 86/75 (BP 1 Location: Left upper arm, BP Patient Position: Semi fowlers)   Pulse 73   Temp 97.5 °F (36.4 °C)   Resp 29   Ht 5' 6\" (1.676 m)   Wt 77.1 kg (170 lb)   SpO2 100%   BMI 27.44 kg/m²    O2 Flow Rate (L/min): 15 l/min O2 Device: Non-rebreather mask    General:   Awake but markedly confused, not following commands or speaking   Head:  Normocephalic, without obvious abnormality, atraumatic. Eyes:  Conjunctivae/corneas clear. PERRL, EOMs intact. Nose: Nares normal. Septum midline. Mucosa normal. No drainage or sinus tenderness. Throat: Lips, mucosa, and tongue normal. Teeth and gums normal.   Neck: Supple, symmetrical, trachea midline, no adenopathy, thyroid: no enlargement/tenderness/nodules, no carotid bruit and no JVD. Back:   Symmetric, no curvature. ROM normal. No CVA tenderness. Lungs:   Clear to auscultation bilaterally. Chest wall:  No tenderness or deformity. Heart:  Regular rate and rhythm, S1, S2 normal, no murmur, click, rub or gallop. Abdomen:   Soft, non-tender. Bowel sounds normal. No masses,  No organomegaly. Extremities:  Left BKA noted   Pulses: 2+ and symmetric all extremities. Skin: Skin color, texture, turgor normal. No rashes or lesions   Neurologic: CNII-XII intact. No motor or sensory deficits.         24 Hour Results:    Recent Results (from the past 24 hour(s))   TROPONIN-HIGH SENSITIVITY    Collection Time: 12/10/21  2:26 PM   Result Value Ref Range    Troponin-High Sensitivity 1,040 (HH) 0 - 76 ng/L   CBC WITH AUTOMATED DIFF    Collection Time: 12/10/21  2:26 PM   Result Value Ref Range    WBC 22.9 (H) 4.1 - 11.1 K/uL    RBC 3.27 (L) 4.10 - 5.70 M/uL    HGB 9.4 (L) 12.1 - 17.0 g/dL    HCT 29.3 (L) 36.6 - 50.3 %    MCV 89.6 80.0 - 99.0 FL    MCH 28.7 26.0 - 34.0 PG    MCHC 32.1 30.0 - 36.5 g/dL    RDW 16.0 (H) 11.5 - 14.5 %    PLATELET 008 (L) 187 - 400 K/uL    NRBC 1.4 (H) 0.0  WBC    ABSOLUTE NRBC 0.31 (H) 0.00 - 0.01 K/uL    NEUTROPHILS PENDING %    LYMPHOCYTES PENDING %    MONOCYTES PENDING %    EOSINOPHILS PENDING %    BASOPHILS PENDING %    IMMATURE GRANULOCYTES PENDING %    ABS. NEUTROPHILS PENDING K/UL    ABS. LYMPHOCYTES PENDING K/UL    ABS. MONOCYTES PENDING K/UL    ABS. EOSINOPHILS PENDING K/UL    ABS. BASOPHILS PENDING K/UL    ABS. IMM. GRANS. PENDING K/UL    DF PENDING    METABOLIC PANEL, COMPREHENSIVE    Collection Time: 12/10/21  2:26 PM   Result Value Ref Range    Sodium 131 (L) 136 - 145 mmol/L    Potassium 6.4 (H) 3.5 - 5.1 mmol/L    Chloride 96 (L) 97 - 108 mmol/L    CO2 10 (LL) 21 - 32 mmol/L    Anion gap 25 (H) 5 - 15 mmol/L    Glucose 164 (H) 65 - 100 mg/dL    BUN 90 (H) 6 - 20 mg/dL    Creatinine 2.94 (H) 0.70 - 1.30 mg/dL    BUN/Creatinine ratio 31 (H) 12 - 20      GFR est AA 26 (L) >60 ml/min/1.73m2    GFR est non-AA 22 (L) >60 ml/min/1.73m2    Calcium 8.6 8.5 - 10.1 mg/dL    Bilirubin, total 2.4 (H) 0.2 - 1.0 mg/dL    AST (SGOT) 1,542 (H) 15 - 37 U/L    ALT (SGPT) 971 (H) 12 - 78 U/L    Alk.  phosphatase 497 (H) 45 - 117 U/L    Protein, total 7.2 6.4 - 8.2 g/dL    Albumin 2.6 (L) 3.5 - 5.0 g/dL    Globulin 4.6 (H) 2.0 - 4.0 g/dL    A-G Ratio 0.6 (L) 1.1 - 2.2     LACTIC ACID    Collection Time: 12/10/21  2:26 PM   Result Value Ref Range    Lactic acid 11.6 (HH) 0.4 - 2.0 mmol/L   COVID-19 RAPID TEST    Collection Time: 12/10/21  2:26 PM   Result Value Ref Range    Specimen source Nasopharyngeal      COVID-19 rapid test Not Detected Not Detected     URINALYSIS W/ REFLEX CULTURE    Collection Time: 12/10/21  2:26 PM    Specimen: Urine   Result Value Ref Range    Color Dark Yellow      Appearance Turbid (A) Clear      Specific gravity 1.018 1.003 - 1.030      pH (UA) 5.0 5.0 - 8.0      Protein >300 (A) Negative mg/dL    Glucose 50 (A) Negative mg/dL    Ketone 5 (A) Negative mg/dL    Bilirubin Negative Negative      Blood Large (A) Negative      Urobilinogen 2.0 (H) 0.1 - 1.0 EU/dL    Nitrites Negative Negative      Leukocyte Esterase Negative Negative      WBC 20-50 0 - 4 /hpf    RBC 5-10 0 - 5 /hpf    Bacteria Negative Negative /hpf    UA:UC IF INDICATED Urine Culture Ordered (A) Culture not indicated by UA result      Mucus Trace (A) Negative /lpf    Hyaline cast 2-5 0 - 5 /lpf   BLOOD GAS, ARTERIAL    Collection Time: 12/10/21  3:02 PM   Result Value Ref Range    pH 7.38 7.35 - 7.45      PCO2 11 (L) 35 - 45 mmHg    PO2 214 (H) 75 - 100 mmHg    O2  >95 %    BICARBONATE 12 (L) 22 - 26 mmol/L    BASE DEFICIT 16.6 (H) 0 - 2 mmol/L    O2 FLOW RATE 15.0 L/min    FIO2 100.0 %    EPAP/CPAP/PEEP 0      SITE Right Radial      LEROY'S TEST PASS           Imaging:   XR CHEST PORT   Final Result      CT HEAD WO CONT    (Results Pending)           Assessment:   Severe sepsis with septic shock, possibly due to UTI    Septic and metabolic encephalopathy    Acute kidney injury, likely ATN due to sepsis and hypotension   -Baseline creatinine 0.57     Shock liver due to hypotension    Severe lactic acidosis     Type II non-STEMI    Multiple organ dysfunction syndrome    Severe peripheral vascular disease with recent axillofemoral bypass due to infrarenal aortic occlusion    Hyperkalemia; received treatment in ED already    Diabetes mellitus type 2    Hypertension    Severe mitral insufficiency    Chronic systolic heart failure with EF 25%    Anemia of chronic disease      Plan:   Admit to ICU  Start Zosyn and vancomycin empirically following cultures  Start norepinephrine if blood pressure low following fluid resuscitation  Sliding scale insulin  Nephrology consult  Continue volume resuscitation  Bicarbonate drip    Overall prognosis appears very poor. Patient is full CODE STATUS by default. Phone numbers listed in the chart are nonworking in terms of reaching any family members      I personally spent   60   minutes of critical care time. This is time spent at this critically ill patient's bedside actively involved in patient care as well as the coordination of care and discussions with the patient's family. This does not include any procedural time which has been billed separately.      Home medications were reviewed    Signed By: Ralph Fitzpatrick MD     December 10, 2021

## 2021-12-11 NOTE — PROGRESS NOTES
General Daily Progress Note          Patient Name:   Santhosh Lopes       YOB: 1955       Age:  77 y.o. Admit Date: 12/10/2021      Subjective:     Patient is seen for follow-up. Santhosh Lopes is a 78 yo male with severe peripheral vascular disease, systolic heart failure, diabetes and hypertension. In mid November he was found to have an infrarenal abdominal aortic occlusion and underwent a right axillofemoral bypass procedure. Echo during that stay showed an EF of 25% along with severe mitral insufficiency and moderate aortic insufficiency. He was discharge to a nursing home on 11/18. Patient presented to the ED from [facility] [place] with hypoxia and altered mental status. Pressure was 85/60 on admission with a respiratory rate of 33 and a heart rate of 80. He received 1.5 L    ----  Today, patient in ICU, seen at bedside, opens his eyes but unable to verbalize. He is unable to move his right arm when asked. Left above knee amputation noted on exam.    Glucose stable at 170, WBC remains elevated at 21,900, HGB remains low at 8.2, Urinalysis showed >300 protein, glucose, ketones, urobilinogen, WBC, trace mucus. PTT was elevated at 52.5 yesterday. Sodium is low at 132, potassium remains elevated at 6.1, bicarb improved at 16, anion gap improving at 20, creatinine increasing at 3.43, calcium decreasing at 7.0, elevated total bilirubin at 2.2, low total protein at 5.6, elevated AST at 1,170, elevated ALT at 721, elevated alk phos at 369. Lactic acid was elevated at 12.3 and troponin was elevated at 1,591 yesterday. Following blood and urine cultures. CXR negative for pneumothorax or pleural effusion. CT head showed evolving large recent left MCA infarct, atherosclerosis, no acute intracranial bleed. ECHO on 11/08/2021 showed LVEF 25-30%.     Objective:     Visit Vitals  BP (!) 122/97 (BP 1 Location: Left upper arm, BP Patient Position: At rest)   Pulse 96   Temp 99.9 °F (37.7 °C)   Resp 25   Ht 5' 6\" (1.676 m)   Wt 170 lb (77.1 kg)   SpO2 98%   BMI 27.44 kg/m²        Recent Results (from the past 24 hour(s))   TROPONIN-HIGH SENSITIVITY    Collection Time: 12/10/21  2:26 PM   Result Value Ref Range    Troponin-High Sensitivity 1,040 (HH) 0 - 76 ng/L   CBC WITH AUTOMATED DIFF    Collection Time: 12/10/21  2:26 PM   Result Value Ref Range    WBC 22.9 (H) 4.1 - 11.1 K/uL    RBC 3.27 (L) 4.10 - 5.70 M/uL    HGB 9.4 (L) 12.1 - 17.0 g/dL    HCT 29.3 (L) 36.6 - 50.3 %    MCV 89.6 80.0 - 99.0 FL    MCH 28.7 26.0 - 34.0 PG    MCHC 32.1 30.0 - 36.5 g/dL    RDW 16.0 (H) 11.5 - 14.5 %    PLATELET 384 (L) 642 - 400 K/uL    NRBC 1.4 (H) 0.0  WBC    ABSOLUTE NRBC 0.31 (H) 0.00 - 0.01 K/uL    NEUTROPHILS 82 (H) 32 - 75 %    LYMPHOCYTES 12 12 - 49 %    MONOCYTES 6 5 - 13 %    EOSINOPHILS 0 0 - 7 %    BASOPHILS 0 0 - 1 %    IMMATURE GRANULOCYTES 0 %    ABS. NEUTROPHILS 18.8 (H) 1.8 - 8.0 K/UL    ABS. LYMPHOCYTES 2.7 0.8 - 3.5 K/UL    ABS. MONOCYTES 1.4 (H) 0.0 - 1.0 K/UL    ABS. EOSINOPHILS 0.0 0.0 - 0.4 K/UL    ABS. BASOPHILS 0.0 0.0 - 0.1 K/UL    ABS. IMM. GRANS. 0.0 K/UL    RBC COMMENTS [place] cells  1+        DF Manual     METABOLIC PANEL, COMPREHENSIVE    Collection Time: 12/10/21  2:26 PM   Result Value Ref Range    Sodium 131 (L) 136 - 145 mmol/L    Potassium 6.4 (H) 3.5 - 5.1 mmol/L    Chloride 96 (L) 97 - 108 mmol/L    CO2 10 (LL) 21 - 32 mmol/L    Anion gap 25 (H) 5 - 15 mmol/L    Glucose 164 (H) 65 - 100 mg/dL    BUN 90 (H) 6 - 20 mg/dL    Creatinine 2.94 (H) 0.70 - 1.30 mg/dL    BUN/Creatinine ratio 31 (H) 12 - 20      GFR est AA 26 (L) >60 ml/min/1.73m2    GFR est non-AA 22 (L) >60 ml/min/1.73m2    Calcium 8.6 8.5 - 10.1 mg/dL    Bilirubin, total 2.4 (H) 0.2 - 1.0 mg/dL    AST (SGOT) 1,542 (H) 15 - 37 U/L    ALT (SGPT) 971 (H) 12 - 78 U/L    Alk.  phosphatase 497 (H) 45 - 117 U/L    Protein, total 7.2 6.4 - 8.2 g/dL    Albumin 2.6 (L) 3.5 - 5.0 g/dL    Globulin 4.6 (H) 2.0 - 4.0 g/dL    A-G Ratio 0.6 (L) 1.1 - 2.2     LACTIC ACID    Collection Time: 12/10/21  2:26 PM   Result Value Ref Range    Lactic acid 11.6 (HH) 0.4 - 2.0 mmol/L   COVID-19 RAPID TEST    Collection Time: 12/10/21  2:26 PM   Result Value Ref Range    Specimen source Nasopharyngeal      COVID-19 rapid test Not Detected Not Detected     URINALYSIS W/ REFLEX CULTURE    Collection Time: 12/10/21  2:26 PM    Specimen: Urine   Result Value Ref Range    Color Dark Yellow      Appearance Turbid (A) Clear      Specific gravity 1.018 1.003 - 1.030      pH (UA) 5.0 5.0 - 8.0      Protein >300 (A) Negative mg/dL    Glucose 50 (A) Negative mg/dL    Ketone 5 (A) Negative mg/dL    Bilirubin Negative Negative      Blood Large (A) Negative      Urobilinogen 2.0 (H) 0.1 - 1.0 EU/dL    Nitrites Negative Negative      Leukocyte Esterase Negative Negative      WBC 20-50 0 - 4 /hpf    RBC 5-10 0 - 5 /hpf    Bacteria Negative Negative /hpf    UA:UC IF INDICATED Urine Culture Ordered (A) Culture not indicated by UA result      Mucus Trace (A) Negative /lpf    Hyaline cast 2-5 0 - 5 /lpf   BLOOD GAS, ARTERIAL    Collection Time: 12/10/21  3:02 PM   Result Value Ref Range    pH 7.38 7.35 - 7.45      PCO2 11 (L) 35 - 45 mmHg    PO2 214 (H) 75 - 100 mmHg    O2  >95 %    BICARBONATE 12 (L) 22 - 26 mmol/L    BASE DEFICIT 16.6 (H) 0 - 2 mmol/L    O2 FLOW RATE 15.0 L/min    FIO2 100.0 %    EPAP/CPAP/PEEP 0      SITE Right Radial      LEROY'S TEST PASS     LACTIC ACID    Collection Time: 12/10/21  3:56 PM   Result Value Ref Range    Lactic acid 12.5 (HH) 0.4 - 2.0 mmol/L   METABOLIC PANEL, BASIC    Collection Time: 12/10/21  4:35 PM   Result Value Ref Range    Sodium 132 (L) 136 - 145 mmol/L    Potassium 6.6 (HH) 3.5 - 5.1 mmol/L    Chloride 96 (L) 97 - 108 mmol/L    CO2 10 (LL) 21 - 32 mmol/L    Anion gap 26 (H) 5 - 15 mmol/L    Glucose 144 (H) 65 - 100 mg/dL    BUN 92 (H) 6 - 20 mg/dL    Creatinine 2.91 (H) 0.70 - 1.30 mg/dL    BUN/Creatinine ratio 32 (H) 12 - 20 GFR est AA 26 (L) >60 ml/min/1.73m2    GFR est non-AA 22 (L) >60 ml/min/1.73m2    Calcium 8.2 (L) 8.5 - 10.1 mg/dL   PROCALCITONIN    Collection Time: 12/10/21  5:03 PM   Result Value Ref Range    Procalcitonin 4.55 (H) 0 ng/mL   METABOLIC PANEL, BASIC    Collection Time: 12/10/21  7:17 PM   Result Value Ref Range    Sodium 134 (L) 136 - 145 mmol/L    Potassium 5.7 (H) 3.5 - 5.1 mmol/L    Chloride 100 97 - 108 mmol/L    CO2 12 (LL) 21 - 32 mmol/L    Anion gap 22 (H) 5 - 15 mmol/L    Glucose 148 (H) 65 - 100 mg/dL    BUN 93 (H) 6 - 20 mg/dL    Creatinine 2.98 (H) 0.70 - 1.30 mg/dL    BUN/Creatinine ratio 31 (H) 12 - 20      GFR est AA 26 (L) >60 ml/min/1.73m2    GFR est non-AA 21 (L) >60 ml/min/1.73m2    Calcium 7.1 (L) 8.5 - 10.1 mg/dL   TROPONIN-HIGH SENSITIVITY    Collection Time: 12/10/21  7:17 PM   Result Value Ref Range    Troponin-High Sensitivity 1,591 (HH) 0 - 76 ng/L   LACTIC ACID    Collection Time: 12/10/21  7:17 PM   Result Value Ref Range    Lactic acid 12.3 (HH) 0.4 - 2.0 mmol/L   MRSA SCREEN - PCR (NASAL)    Collection Time: 12/10/21  8:00 PM   Result Value Ref Range    MRSA by PCR, Nasal Not Detected Not Detected     PTT    Collection Time: 12/10/21 10:00 PM   Result Value Ref Range    aPTT 52.5 (H) 21.2 - 34.1 sec    aPTT, therapeutic range   82 - 109 sec   CBC WITH AUTOMATED DIFF    Collection Time: 12/10/21 10:00 PM   Result Value Ref Range    WBC 22.4 (H) 4.1 - 11.1 K/uL    RBC 2.81 (L) 4.10 - 5.70 M/uL    HGB 8.2 (L) 12.1 - 17.0 g/dL    HCT 25.6 (L) 36.6 - 50.3 %    MCV 91.1 80.0 - 99.0 FL    MCH 29.2 26.0 - 34.0 PG    MCHC 32.0 30.0 - 36.5 g/dL    RDW 15.9 (H) 11.5 - 14.5 %    PLATELET 013 (L) 886 - 400 K/uL    NRBC 1.3 (H) 0.0  WBC    ABSOLUTE NRBC 0.30 (H) 0.00 - 0.01 K/uL    NEUTROPHILS 86 (H) 32 - 75 %    LYMPHOCYTES 7 (L) 12 - 49 %    MONOCYTES 7 5 - 13 %    EOSINOPHILS 0 0 - 7 %    BASOPHILS 0 0 - 1 %    IMMATURE GRANULOCYTES 0 %    ABS.  NEUTROPHILS 19.2 (H) 1.8 - 8.0 K/UL ABS. LYMPHOCYTES 1.6 0.8 - 3.5 K/UL    ABS. MONOCYTES 1.6 (H) 0.0 - 1.0 K/UL    ABS. EOSINOPHILS 0.0 0.0 - 0.4 K/UL    ABS. BASOPHILS 0.0 0.0 - 0.1 K/UL    ABS. IMM. GRANS. 0.0 K/UL    DF Manual      RBC COMMENTS Normocytic, Normochromic     GLUCOSE, POC    Collection Time: 12/11/21  1:31 AM   Result Value Ref Range    Glucose (POC) 170 (H) 65 - 117 mg/dL    Performed by SRIRAM PINEDA    CBC WITH AUTOMATED DIFF    Collection Time: 12/11/21  2:50 AM   Result Value Ref Range    WBC 21.9 (H) 4.1 - 11.1 K/uL    RBC 2.80 (L) 4.10 - 5.70 M/uL    HGB 8.2 (L) 12.1 - 17.0 g/dL    HCT 24.5 (L) 36.6 - 50.3 %    MCV 87.5 80.0 - 99.0 FL    MCH 29.3 26.0 - 34.0 PG    MCHC 33.5 30.0 - 36.5 g/dL    RDW 15.7 (H) 11.5 - 14.5 %    PLATELET 666 (L) 002 - 400 K/uL    MPV 13.0 (H) 8.9 - 12.9 FL    NRBC 1.6 (H) 0.0  WBC    ABSOLUTE NRBC 0.34 (H) 0.00 - 0.01 K/uL    NEUTROPHILS PENDING %    LYMPHOCYTES PENDING %    MONOCYTES PENDING %    EOSINOPHILS PENDING %    BASOPHILS PENDING %    IMMATURE GRANULOCYTES PENDING %    ABS. NEUTROPHILS PENDING K/UL    ABS. LYMPHOCYTES PENDING K/UL    ABS. MONOCYTES PENDING K/UL    ABS. EOSINOPHILS PENDING K/UL    ABS. BASOPHILS PENDING K/UL    ABS. IMM. GRANS. PENDING K/UL    DF PENDING    HEPATIC FUNCTION PANEL    Collection Time: 12/11/21  2:50 AM   Result Value Ref Range    Protein, total 5.6 (L) 6.4 - 8.2 g/dL    Albumin 1.9 (L) 3.5 - 5.0 g/dL    Globulin 3.7 2.0 - 4.0 g/dL    A-G Ratio 0.5 (L) 1.1 - 2.2      Bilirubin, total 2.2 (H) 0.2 - 1.0 mg/dL    Bilirubin, direct 1.5 (H) 0.0 - 0.2 mg/dL    Alk.  phosphatase 369 (H) 45 - 117 U/L    AST (SGOT) 1,170 (H) 15 - 37 U/L    ALT (SGPT) 721 (H) 12 - 78 U/L   VANCOMYCIN, RANDOM    Collection Time: 12/11/21  2:50 AM   Result Value Ref Range    Vancomycin, random 15.7 ug/mL   RENAL FUNCTION PANEL    Collection Time: 12/11/21  2:50 AM   Result Value Ref Range    Sodium 132 (L) 136 - 145 mmol/L    Potassium 6.1 (H) 3.5 - 5.1 mmol/L    Chloride 96 (L) 97 - 108 mmol/L    CO2 16 (L) 21 - 32 mmol/L    Anion gap 20 (H) 5 - 15 mmol/L    Glucose 245 (H) 65 - 100 mg/dL     (H) 6 - 20 mg/dL    Creatinine 3.43 (H) 0.70 - 1.30 mg/dL    BUN/Creatinine ratio 29 (H) 12 - 20      GFR est AA 22 (L) >60 ml/min/1.73m2    GFR est non-AA 18 (L) >60 ml/min/1.73m2    Calcium 7.0 (L) 8.5 - 10.1 mg/dL    Phosphorus 5.9 (H) 2.6 - 4.7 mg/dL    Albumin 1.9 (L) 3.5 - 5.0 g/dL   MAGNESIUM    Collection Time: 12/11/21  2:50 AM   Result Value Ref Range    Magnesium 2.3 1.6 - 2.4 mg/dL   BLOOD GAS, ARTERIAL    Collection Time: 12/11/21  6:40 AM   Result Value Ref Range    pH 7.49 (H) 7.35 - 7.45      PCO2 17 (L) 35 - 45 mmHg    PO2 138 (H) 75 - 100 mmHg    O2  >95 %    BICARBONATE 17 (L) 22 - 26 mmol/L    BASE DEFICIT 9.1 (H) 0 - 2 mmol/L    O2 METHOD Nasal Cannula      O2 FLOW RATE 6.0 L/min    SITE Left Brachial      LEROY'S TEST PASS      Critical value read back  CALLED TO ICU ABBY PINEDA       [unfilled]      Review of Systems    Constitutional: Negative for chills and fever. HENT: Negative. Eyes: Negative. Respiratory: Negative. Cardiovascular: Negative. Gastrointestinal: Negative for abdominal pain and nausea. Skin: Negative. Neurological: Negative. Physical Exam:      Constitutional: pt is oriented to person, place, and time. HENT:   Head: Normocephalic and atraumatic. Eyes: Pupils are equal, round, and reactive to light. EOM are normal.   Cardiovascular: Normal rate, regular rhythm and normal heart sounds. Pulmonary/Chest: Breath sounds normal. No wheezes. No rales. Exhibits no tenderness. Abdominal: Soft. Bowel sounds are normal. There is no abdominal tenderness. There is no rebound and no guarding. Musculoskeletal: Normal range of motion. Neurological: pt is alert and oriented to person, place, and time. CT HEAD WO CONT   Final Result      Evolving large recent left MCA infarct. No acute intracranial bleed. Atherosclerosis. Follow-up as clinically indicated. CT HEAD WO CONT   Final Result   No change compared to CT head November 7, 2021. Same distribution   left cerebral encephalomalacia. XR CHEST PORT   Final Result      MRI BRAIN WO CONT    (Results Pending)        Recent Results (from the past 24 hour(s))   TROPONIN-HIGH SENSITIVITY    Collection Time: 12/10/21  2:26 PM   Result Value Ref Range    Troponin-High Sensitivity 1,040 (HH) 0 - 76 ng/L   CBC WITH AUTOMATED DIFF    Collection Time: 12/10/21  2:26 PM   Result Value Ref Range    WBC 22.9 (H) 4.1 - 11.1 K/uL    RBC 3.27 (L) 4.10 - 5.70 M/uL    HGB 9.4 (L) 12.1 - 17.0 g/dL    HCT 29.3 (L) 36.6 - 50.3 %    MCV 89.6 80.0 - 99.0 FL    MCH 28.7 26.0 - 34.0 PG    MCHC 32.1 30.0 - 36.5 g/dL    RDW 16.0 (H) 11.5 - 14.5 %    PLATELET 016 (L) 443 - 400 K/uL    NRBC 1.4 (H) 0.0  WBC    ABSOLUTE NRBC 0.31 (H) 0.00 - 0.01 K/uL    NEUTROPHILS 82 (H) 32 - 75 %    LYMPHOCYTES 12 12 - 49 %    MONOCYTES 6 5 - 13 %    EOSINOPHILS 0 0 - 7 %    BASOPHILS 0 0 - 1 %    IMMATURE GRANULOCYTES 0 %    ABS. NEUTROPHILS 18.8 (H) 1.8 - 8.0 K/UL    ABS. LYMPHOCYTES 2.7 0.8 - 3.5 K/UL    ABS. MONOCYTES 1.4 (H) 0.0 - 1.0 K/UL    ABS. EOSINOPHILS 0.0 0.0 - 0.4 K/UL    ABS. BASOPHILS 0.0 0.0 - 0.1 K/UL    ABS. IMM.  GRANS. 0.0 K/UL    RBC COMMENTS [place] cells  1+        DF Manual     METABOLIC PANEL, COMPREHENSIVE    Collection Time: 12/10/21  2:26 PM   Result Value Ref Range    Sodium 131 (L) 136 - 145 mmol/L    Potassium 6.4 (H) 3.5 - 5.1 mmol/L    Chloride 96 (L) 97 - 108 mmol/L    CO2 10 (LL) 21 - 32 mmol/L    Anion gap 25 (H) 5 - 15 mmol/L    Glucose 164 (H) 65 - 100 mg/dL    BUN 90 (H) 6 - 20 mg/dL    Creatinine 2.94 (H) 0.70 - 1.30 mg/dL    BUN/Creatinine ratio 31 (H) 12 - 20      GFR est AA 26 (L) >60 ml/min/1.73m2    GFR est non-AA 22 (L) >60 ml/min/1.73m2    Calcium 8.6 8.5 - 10.1 mg/dL    Bilirubin, total 2.4 (H) 0.2 - 1.0 mg/dL    AST (SGOT) 1,542 (H) 15 - 37 U/L    ALT (SGPT) 971 (H) 12 - 78 U/L    Alk.  phosphatase 497 (H) 45 - 117 U/L    Protein, total 7.2 6.4 - 8.2 g/dL    Albumin 2.6 (L) 3.5 - 5.0 g/dL    Globulin 4.6 (H) 2.0 - 4.0 g/dL    A-G Ratio 0.6 (L) 1.1 - 2.2     LACTIC ACID    Collection Time: 12/10/21  2:26 PM   Result Value Ref Range    Lactic acid 11.6 (HH) 0.4 - 2.0 mmol/L   COVID-19 RAPID TEST    Collection Time: 12/10/21  2:26 PM   Result Value Ref Range    Specimen source Nasopharyngeal      COVID-19 rapid test Not Detected Not Detected     URINALYSIS W/ REFLEX CULTURE    Collection Time: 12/10/21  2:26 PM    Specimen: Urine   Result Value Ref Range    Color Dark Yellow      Appearance Turbid (A) Clear      Specific gravity 1.018 1.003 - 1.030      pH (UA) 5.0 5.0 - 8.0      Protein >300 (A) Negative mg/dL    Glucose 50 (A) Negative mg/dL    Ketone 5 (A) Negative mg/dL    Bilirubin Negative Negative      Blood Large (A) Negative      Urobilinogen 2.0 (H) 0.1 - 1.0 EU/dL    Nitrites Negative Negative      Leukocyte Esterase Negative Negative      WBC 20-50 0 - 4 /hpf    RBC 5-10 0 - 5 /hpf    Bacteria Negative Negative /hpf    UA:UC IF INDICATED Urine Culture Ordered (A) Culture not indicated by UA result      Mucus Trace (A) Negative /lpf    Hyaline cast 2-5 0 - 5 /lpf   BLOOD GAS, ARTERIAL    Collection Time: 12/10/21  3:02 PM   Result Value Ref Range    pH 7.38 7.35 - 7.45      PCO2 11 (L) 35 - 45 mmHg    PO2 214 (H) 75 - 100 mmHg    O2  >95 %    BICARBONATE 12 (L) 22 - 26 mmol/L    BASE DEFICIT 16.6 (H) 0 - 2 mmol/L    O2 FLOW RATE 15.0 L/min    FIO2 100.0 %    EPAP/CPAP/PEEP 0      SITE Right Radial      LEROY'S TEST PASS     LACTIC ACID    Collection Time: 12/10/21  3:56 PM   Result Value Ref Range    Lactic acid 12.5 (HH) 0.4 - 2.0 mmol/L   METABOLIC PANEL, BASIC    Collection Time: 12/10/21  4:35 PM   Result Value Ref Range    Sodium 132 (L) 136 - 145 mmol/L    Potassium 6.6 (HH) 3.5 - 5.1 mmol/L    Chloride 96 (L) 97 - 108 mmol/L    CO2 10 (LL) 21 - 32 mmol/L Anion gap 26 (H) 5 - 15 mmol/L    Glucose 144 (H) 65 - 100 mg/dL    BUN 92 (H) 6 - 20 mg/dL    Creatinine 2.91 (H) 0.70 - 1.30 mg/dL    BUN/Creatinine ratio 32 (H) 12 - 20      GFR est AA 26 (L) >60 ml/min/1.73m2    GFR est non-AA 22 (L) >60 ml/min/1.73m2    Calcium 8.2 (L) 8.5 - 10.1 mg/dL   PROCALCITONIN    Collection Time: 12/10/21  5:03 PM   Result Value Ref Range    Procalcitonin 4.55 (H) 0 ng/mL   METABOLIC PANEL, BASIC    Collection Time: 12/10/21  7:17 PM   Result Value Ref Range    Sodium 134 (L) 136 - 145 mmol/L    Potassium 5.7 (H) 3.5 - 5.1 mmol/L    Chloride 100 97 - 108 mmol/L    CO2 12 (LL) 21 - 32 mmol/L    Anion gap 22 (H) 5 - 15 mmol/L    Glucose 148 (H) 65 - 100 mg/dL    BUN 93 (H) 6 - 20 mg/dL    Creatinine 2.98 (H) 0.70 - 1.30 mg/dL    BUN/Creatinine ratio 31 (H) 12 - 20      GFR est AA 26 (L) >60 ml/min/1.73m2    GFR est non-AA 21 (L) >60 ml/min/1.73m2    Calcium 7.1 (L) 8.5 - 10.1 mg/dL   TROPONIN-HIGH SENSITIVITY    Collection Time: 12/10/21  7:17 PM   Result Value Ref Range    Troponin-High Sensitivity 1,591 (HH) 0 - 76 ng/L   LACTIC ACID    Collection Time: 12/10/21  7:17 PM   Result Value Ref Range    Lactic acid 12.3 (HH) 0.4 - 2.0 mmol/L   MRSA SCREEN - PCR (NASAL)    Collection Time: 12/10/21  8:00 PM   Result Value Ref Range    MRSA by PCR, Nasal Not Detected Not Detected     PTT    Collection Time: 12/10/21 10:00 PM   Result Value Ref Range    aPTT 52.5 (H) 21.2 - 34.1 sec    aPTT, therapeutic range   82 - 109 sec   CBC WITH AUTOMATED DIFF    Collection Time: 12/10/21 10:00 PM   Result Value Ref Range    WBC 22.4 (H) 4.1 - 11.1 K/uL    RBC 2.81 (L) 4.10 - 5.70 M/uL    HGB 8.2 (L) 12.1 - 17.0 g/dL    HCT 25.6 (L) 36.6 - 50.3 %    MCV 91.1 80.0 - 99.0 FL    MCH 29.2 26.0 - 34.0 PG    MCHC 32.0 30.0 - 36.5 g/dL    RDW 15.9 (H) 11.5 - 14.5 %    PLATELET 353 (L) 490 - 400 K/uL    NRBC 1.3 (H) 0.0  WBC    ABSOLUTE NRBC 0.30 (H) 0.00 - 0.01 K/uL    NEUTROPHILS 86 (H) 32 - 75 % LYMPHOCYTES 7 (L) 12 - 49 %    MONOCYTES 7 5 - 13 %    EOSINOPHILS 0 0 - 7 %    BASOPHILS 0 0 - 1 %    IMMATURE GRANULOCYTES 0 %    ABS. NEUTROPHILS 19.2 (H) 1.8 - 8.0 K/UL    ABS. LYMPHOCYTES 1.6 0.8 - 3.5 K/UL    ABS. MONOCYTES 1.6 (H) 0.0 - 1.0 K/UL    ABS. EOSINOPHILS 0.0 0.0 - 0.4 K/UL    ABS. BASOPHILS 0.0 0.0 - 0.1 K/UL    ABS. IMM. GRANS. 0.0 K/UL    DF Manual      RBC COMMENTS Normocytic, Normochromic     GLUCOSE, POC    Collection Time: 12/11/21  1:31 AM   Result Value Ref Range    Glucose (POC) 170 (H) 65 - 117 mg/dL    Performed by SRIRAM PINEDA    CBC WITH AUTOMATED DIFF    Collection Time: 12/11/21  2:50 AM   Result Value Ref Range    WBC 21.9 (H) 4.1 - 11.1 K/uL    RBC 2.80 (L) 4.10 - 5.70 M/uL    HGB 8.2 (L) 12.1 - 17.0 g/dL    HCT 24.5 (L) 36.6 - 50.3 %    MCV 87.5 80.0 - 99.0 FL    MCH 29.3 26.0 - 34.0 PG    MCHC 33.5 30.0 - 36.5 g/dL    RDW 15.7 (H) 11.5 - 14.5 %    PLATELET 273 (L) 443 - 400 K/uL    MPV 13.0 (H) 8.9 - 12.9 FL    NRBC 1.6 (H) 0.0  WBC    ABSOLUTE NRBC 0.34 (H) 0.00 - 0.01 K/uL    NEUTROPHILS PENDING %    LYMPHOCYTES PENDING %    MONOCYTES PENDING %    EOSINOPHILS PENDING %    BASOPHILS PENDING %    IMMATURE GRANULOCYTES PENDING %    ABS. NEUTROPHILS PENDING K/UL    ABS. LYMPHOCYTES PENDING K/UL    ABS. MONOCYTES PENDING K/UL    ABS. EOSINOPHILS PENDING K/UL    ABS. BASOPHILS PENDING K/UL    ABS. IMM. GRANS. PENDING K/UL    DF PENDING    HEPATIC FUNCTION PANEL    Collection Time: 12/11/21  2:50 AM   Result Value Ref Range    Protein, total 5.6 (L) 6.4 - 8.2 g/dL    Albumin 1.9 (L) 3.5 - 5.0 g/dL    Globulin 3.7 2.0 - 4.0 g/dL    A-G Ratio 0.5 (L) 1.1 - 2.2      Bilirubin, total 2.2 (H) 0.2 - 1.0 mg/dL    Bilirubin, direct 1.5 (H) 0.0 - 0.2 mg/dL    Alk.  phosphatase 369 (H) 45 - 117 U/L    AST (SGOT) 1,170 (H) 15 - 37 U/L    ALT (SGPT) 721 (H) 12 - 78 U/L   VANCOMYCIN, RANDOM    Collection Time: 12/11/21  2:50 AM   Result Value Ref Range    Vancomycin, random 15.7 ug/mL RENAL FUNCTION PANEL    Collection Time: 12/11/21  2:50 AM   Result Value Ref Range    Sodium 132 (L) 136 - 145 mmol/L    Potassium 6.1 (H) 3.5 - 5.1 mmol/L    Chloride 96 (L) 97 - 108 mmol/L    CO2 16 (L) 21 - 32 mmol/L    Anion gap 20 (H) 5 - 15 mmol/L    Glucose 245 (H) 65 - 100 mg/dL     (H) 6 - 20 mg/dL    Creatinine 3.43 (H) 0.70 - 1.30 mg/dL    BUN/Creatinine ratio 29 (H) 12 - 20      GFR est AA 22 (L) >60 ml/min/1.73m2    GFR est non-AA 18 (L) >60 ml/min/1.73m2    Calcium 7.0 (L) 8.5 - 10.1 mg/dL    Phosphorus 5.9 (H) 2.6 - 4.7 mg/dL    Albumin 1.9 (L) 3.5 - 5.0 g/dL   MAGNESIUM    Collection Time: 12/11/21  2:50 AM   Result Value Ref Range    Magnesium 2.3 1.6 - 2.4 mg/dL   BLOOD GAS, ARTERIAL    Collection Time: 12/11/21  6:40 AM   Result Value Ref Range    pH 7.49 (H) 7.35 - 7.45      PCO2 17 (L) 35 - 45 mmHg    PO2 138 (H) 75 - 100 mmHg    O2  >95 %    BICARBONATE 17 (L) 22 - 26 mmol/L    BASE DEFICIT 9.1 (H) 0 - 2 mmol/L    O2 METHOD Nasal Cannula      O2 FLOW RATE 6.0 L/min    SITE Left Brachial      LEROY'S TEST PASS      Critical value read back  CALLED TO ICU ABBY PINEDA         Results     Procedure Component Value Units Date/Time    MRSA SCREEN - PCR (NASAL) [[id number]] Collected: 12/10/21 2000    Order Status: Completed Specimen: Swab Updated: 12/11/21 0343     MRSA by PCR, Nasal Not Detected       CULTURE, BLOOD, PAIRED [[id number]] Collected: 12/10/21 1426    Order Status: Completed Specimen: Blood Updated: 12/10/21 1441    COVID-19 RAPID TEST [[id number]] Collected: 12/10/21 1426    Order Status: Completed Specimen: Nasopharyngeal Updated: 12/10/21 1533     Specimen source Nasopharyngeal        COVID-19 rapid test Not Detected        Comment: Rapid Abbott ID Now   Rapid NAAT:  The specimen is NEGATIVE for SARS-CoV-2, the novel coronavirus associated with COVID-19.    Negative results should be treated as presumptive and, if inconsistent with clinical signs and symptoms or necessary for patient management, should be tested with an alternative molecular assay. Negative results do not preclude SARS-CoV-2 infection and should not be used as the sole basis for patient management decisions. This test has been authorized by the FDA under   an Emergency Use Authorization (EUA) for use by authorized laboratories. Fact sheet for Healthcare Providers: [employer] Fact sheet for Patients: [employer]   Methodology: Isothermal Nucleic Acid Amplification         CULTURE, URINE [[id number]] Collected: 12/10/21 1426    Order Status: Completed Specimen: Urine Updated: 12/10/21 1507           Labs:     Recent Labs     12/11/21  0250 12/10/21  2200   WBC 21.9* 22.4*   HGB 8.2* 8.2*   HCT 24.5* 25.6*   * 113*     Recent Labs     12/11/21  0250 12/10/21  1917 12/10/21  1635   * 134* 132*   K 6.1* 5.7* 6.6*   CL 96* 100 96*   CO2 16* 12* 10*   * 93* 92*   CREA 3.43* 2.98* 2.91*   * 148* 144*   CA 7.0* 7.1* 8.2*   MG 2.3  --   --    PHOS 5.9*  --   --      Recent Labs     12/11/21  0250 12/10/21  1426   * 971*   * 497*   TBILI 2.2* 2.4*   TP 5.6* 7.2   ALB 1.9*  1.9* 2.6*   GLOB 3.7 4.6*     Recent Labs     12/10/21  2200   APTT 52.5*      No results for input(s): FE, TIBC, PSAT, FERR in the last 72 hours. No results found for: FOL, RBCF   Recent Labs     12/11/21  0640 12/10/21  1502   PH 7.49* 7.38   PCO2 17* 11*   PO2 138* 214*     No results for input(s): CPK, CKNDX, TROIQ in the last 72 hours.     No lab exists for component: CPKMB  Lab Results   Component Value Date/Time    Cholesterol, total 165 11/08/2021 02:55 AM    HDL Cholesterol 37 11/08/2021 02:55 AM    LDL, calculated 110.8 (H) 11/08/2021 02:55 AM    Triglyceride 86 11/08/2021 02:55 AM    CHOL/HDL Ratio 4.5 11/08/2021 02:55 AM     Lab Results   Component Value Date/Time    Glucose (POC) 170 (H) 12/11/2021 01:31 AM    Glucose (POC) 188 (H) 11/12/2021 11:18 AM    Glucose (POC) 182 (H) 11/12/2021 07:39 AM    Glucose (POC) 191 (H) 11/11/2021 10:00 PM    Glucose (POC) 177 (H) 11/11/2021 04:15 PM     Lab Results   Component Value Date/Time    Color Dark Yellow 12/10/2021 02:26 PM    Appearance Turbid (A) 12/10/2021 02:26 PM    Specific gravity 1.018 12/10/2021 02:26 PM    pH (UA) 5.0 12/10/2021 02:26 PM    Protein >300 (A) 12/10/2021 02:26 PM    Glucose 50 (A) 12/10/2021 02:26 PM    Ketone 5 (A) 12/10/2021 02:26 PM    Bilirubin Negative 12/10/2021 02:26 PM    Urobilinogen 2.0 (H) 12/10/2021 02:26 PM    Nitrites Negative 12/10/2021 02:26 PM    Leukocyte Esterase Negative 12/10/2021 02:26 PM    Bacteria Negative 12/10/2021 02:26 PM    WBC 20-50 12/10/2021 02:26 PM    RBC 5-10 12/10/2021 02:26 PM         Assessment:    Severe sepsis with septic shock, possibly due to UTI     Septic and metabolic encephalopathy     Acute kidney injury, likely ischemic ATN due to sepsis and hypotension  - Baseline creatinine 0.57      Shock liver due to hypotension     Severe lactic acidosis, 12.3     Type II non-STEMI     Multiple organ dysfunction syndrome     Severe peripheral vascular disease with recent axillofemoral bypass due to infrarenal aortic occlusion     Hyperkalemia; received treatment in ED already    Diabetes mellitus type 2     Hypertension     Severe mitral insufficiency    Chronic systolic heart failure with EF 25%    Anemia of chronic disease      Plan:    Continue Zosyn and vancomycin empirically following cultures  Continue norepinephrine  Sliding scale insulin  Nephrology consult  Cardiology consult  Continue neuro check  Continue volume resuscitation  Bicarbonate drip  Daily BMP     Overall prognosis appears very poor. Patient is full CODE STATUS by default.   Phone numbers listed in the chart are nonworking in terms of reaching any family members     Home medications were reviewed      Current Facility-Administered Medications:     LORazepam (ATIVAN) injection 2 mg, 2 mg, IntraVENous, Q4H PRN, Tyrell Olivares MD    sodium chloride (NS) flush 5-10 mL, 5-10 mL, IntraVENous, PRN, Tammie Ontiveros MD    piperacillin-tazobactam (ZOSYN) 3.375 g in 0.9% sodium chloride (MBP/ADV) 100 mL MBP, 3.375 g, IntraVENous, Q8H, Tammie Camejo MD, Last Rate: 25 mL/hr at 12/11/21 0038, 3.375 g at 12/11/21 0038    levoFLOXacin (LEVAQUIN) 750 mg in D5W IVPB, 750 mg, IntraVENous, Q48H, Tammie Camejo MD, IV Completed at 12/10/21 1826    dextrose 5% 1,000 mL with sodium bicarbonate (8.4%) 100 mEq infusion, , IntraVENous, CONTINUOUS, Ping Broderick MD, Last Rate: 125 mL/hr at 12/11/21 0400, New Bag at 12/11/21 0400    VANCOMYCIN INFORMATION NOTE, , Other, Rx Dosing/Monitoring, Ping Broderick MD    NOREPINephrine (LEVOPHED) 8 mg in 5% dextrose 250mL (32 mcg/mL) infusion, 0.5-16 mcg/min, IntraVENous, TITRATE, Tisha Hermosillo MD, Last Rate: 7.5 mL/hr at 12/10/21 2337, 4 mcg/min at 12/10/21 2337    aspirin (ASA) suppository 300 mg, 300 mg, Rectal, DAILY, Jose Lorenzana MD    heparin (porcine) 25,000 units in 0.45% saline 250 ml infusion, 12-25 Units/kg/hr (Adjusted), IntraVENous, TITRATE, Jose Lorenzana MD, Last Rate: 8.3 mL/hr at 12/11/21 0403, 12 Units/kg/hr at 12/11/21 0403    heparin (porcine) 1,000 unit/mL injection 4,000 Units, 4,000 Units, IntraVENous, PRN **OR** heparin (porcine) 1,000 unit/mL injection 2,000 Units, 2,000 Units, IntraVENous, PRN, Jose Lorenzana MD

## 2021-12-11 NOTE — ROUTINE PROCESS
TRANSFER - OUT REPORT:    Verbal report given to Kristen(name) on Loida Enriquez  being transferred toCVICU(unit) for routine progression of care       Report consisted of patients Situation, Background, Assessment and   Recommendations(SBAR). Information from the following report(s) SBAR, ED Summary, MAR, Recent Results and Cardiac Rhythm NSR was reviewed with the receiving nurse. Lines:   Peripheral IV 12/10/21 Left Antecubital (Active)       Peripheral IV 12/10/21 Anterior; Left Forearm (Active)        Opportunity for questions and clarification was provided.       Patient transported with:   Monitor  O2 @ 4 liters  Registered Nurse

## 2021-12-11 NOTE — CONSULTS
[facility] CONSULT    Name Marianne Sanchez Age 77 y.o. MRN [id number]  1955     Referring Physician: None      Chief Complaint:  CVA     This is a 77 y.o. -American male who was admitted through the ED yesterday [facility] hypoxia, altered mental status and hypotension with a blood pressure of 85/60 with a respiratory rate of 33 and a heart rate of 80 labs showing hemoglobin 9.4 platelets 975 WBC 23 sodium 131 potassium 6.4 CO2 10 creatinine 2.9 BUN 90 troponin 1000 lactate 11.6 AST 53 and 42  ABG showed pH of 7.38 PCO2 11 PO2 214. He was ruled in for non-ST elevation MI. He was also confused and a CT scan of the head was done which showed large left MCA infarct and neurology was consulted. At the time of my evaluation this morning the patient is globally aphasic. He is unable to talk or comprehend. His right side is almost flaccid with no significant movement even with the pain. Assessment and Plan:  1. Left MCA distribution large stroke: The patient is globally aphasic. The etiology most likely embolic secondary to acute MI. Patient's CAT scan already showed the stroke, so MRI of the brain is not indicated and will cancel the order. Patient was started on heparin by cardiology but I was just informed that it has been discontinued. If the cardiology plan to restart the heparin for acute MI, will recommend to do it without boluses. It is okay to start the patient on aspirin 3 mg suppository once a day. Continue neurochecks every 4 hourly and patient with any changes  2. Acute non-ST elevation MI.  3.  Aortic insufficiency. 4.  Mitral valve insufficiency  5. Peripheral vascular disease status post left above-knee amputation. 6.  Hypertension. 7.  Diabetes mellitus. 8.  Systolic heart failure with a EF of 25%.     Thank you for the consult    No Known Allergies        Current Facility-Administered Medications   Medication Dose Route Frequency    LORazepam (ATIVAN) injection 2 mg  2 mg IntraVENous Q4H PRN    sodium chloride (NS) flush 5-10 mL  5-10 mL IntraVENous PRN    piperacillin-tazobactam (ZOSYN) 3.375 g in 0.9% sodium chloride (MBP/ADV) 100 mL MBP  3.375 g IntraVENous Q8H    levoFLOXacin (LEVAQUIN) 750 mg in D5W IVPB  750 mg IntraVENous Q48H    dextrose 5% 1,000 mL with sodium bicarbonate (8.4%) 100 mEq infusion   IntraVENous CONTINUOUS    VANCOMYCIN INFORMATION NOTE   Other Rx Dosing/Monitoring    NOREPINephrine (LEVOPHED) 8 mg in 5% dextrose 250mL (32 mcg/mL) infusion  0.5-16 mcg/min IntraVENous TITRATE    aspirin (ASA) suppository 300 mg  300 mg Rectal DAILY       Past Medical History:   Diagnosis Date    Aortic insufficiency     Diabetes ([facility])     Hypertension     Mitral insufficiency     PVD (peripheral vascular disease) ([facility])     Systolic heart failure ([facility])     EF 25%        Social History     Tobacco Use    Smoking status: Not on file    Smokeless tobacco: Not on file   Substance Use Topics    Alcohol use: Not on file    Drug use: Not on file            Exam  Visit Vitals  BP 90/65 (BP 1 Location: Left upper arm, BP Patient Position: At rest)   Pulse 96   Temp 99.9 °F (37.7 °C)   Resp 26   Ht 5' 6\" (1.676 m)   Wt 77.1 kg (170 lb)   SpO2 100%   BMI 27.44 kg/m²         General: Well developed, well nourished. Patient in no distress   Head: Normocephalic, atraumatic, anicteric sclera   Neck Normal ROM, No thyromegally   Lungs:  Clear to auscultation    Cardiac: Regular rate and rhythm with no murmurs. Abd: Bowel sounds were audible   Ext: Edema of the right foot, left above-knee amputation   Skin: Supple no rash     NeurologicExam:  Mental Status:  Awake but not alert due to aphasia   Speech:  Globally aphasic with inability to converse or follow commands.    Cranial Nerves:   Pupils are equal round and reactive to light and accommodation, spontaneous eye movements noted visual field intact to threat on the left but no response to threat on the right, no nystagmus noted, face is symmetric. Motor:   Moves left upper and lower ext stump well. Normal bulk and tone. Reflexes:   Deep tendon reflexes 1/4 biceps, brachioradialis and right knee 0/4. Sensory:    Unable to check   Gait:  Gait is deferred   Tremor:   No tremor noted. Cerebellar:  Coordination could not be checked. Neurovascular: No carotid bruits.  No JVD      Lab Review  Lab Results   Component Value Date/Time    WBC 21.9 (H) 12/11/2021 02:50 AM    HCT 24.5 (L) 12/11/2021 02:50 AM    HGB 8.2 (L) 12/11/2021 02:50 AM    PLATELET 454 (L) 95/56/6308 02:50 AM     Lab Results   Component Value Date/Time    Sodium 132 (L) 12/11/2021 02:50 AM    Potassium 6.1 (H) 12/11/2021 02:50 AM    Chloride 96 (L) 12/11/2021 02:50 AM    CO2 16 (L) 12/11/2021 02:50 AM    Glucose 245 (H) 12/11/2021 02:50 AM     (H) 12/11/2021 02:50 AM    Creatinine 3.43 (H) 12/11/2021 02:50 AM    Calcium 7.0 (L) 12/11/2021 02:50 AM     No results found for: B12LT, FOL, RBCF  Lab Results   Component Value Date/Time    LDL, calculated 110.8 (H) 11/08/2021 02:55 AM     Lab Results   Component Value Date/Time    Hemoglobin A1c 7.1 (H) 11/08/2021 02:55 AM     No components found for: TROPQUANT  No results found for: MARCELL

## 2021-12-11 NOTE — PROGRESS NOTES
Bedside and Verbal shift change report given to ABBY Vernon (oncoming nurse) by Sonia Ruiz RN (offgoing nurse). Report included the following information SBAR, Kardex, ED Summary, MAR, Med Rec Status and Cardiac Rhythm SR.     0730: Assessment completed. DR. Nadiya Omalley updated, concern about the Heparin drip due to the CT result, asked to call Dr Magnus Zarate and Dr Margaux Macias. 0830: Dr Magnus Zarate rounded on patient, order to cancel MRI and will follow up with DR Margaux Macias regarding Heparin drip. Also, stated that patient Heparin drip can be titrated based on PTT results but no bolus. 0900: Heparin gtt discontinued by cardiologist.     1400: Critical trop. result of 4,093  reported to , no new order, stated he will come see the patient. 1500: No urine output bladder scan completed 0 ml noted, nephrology notified. 1900 Bedside and Verbal shift change report given to ABBY Peterson (oncoming nurse) by ABBY Vernon (offgoing nurse).  Report included the following information SBAR, Kardex, Intake/Output, Recent Results, Med Rec Status and Cardiac Rhythm SR.

## 2021-12-11 NOTE — CONSULTS
Consult Date: 12/11/2021    Consults    Subjective     73y M w/ pmhx remarkable for HTN, AR, CAD s/p CABG, hx of PAD s/p BKA- L, hx of CHF BIBEMS due to AMS as per NH staff. Most of the information was obtained from EMR due to pt baseline condition. Hospital course c/b New L MCA CVA, septic shock requiring vasopressors, NSTEMI and YUNIOR w/ Scr at 2.9. Evaluated an seen at bedside, obtunded but arousable. Remains in ICU for monitoring and on pressors for hemodynamics.      Nephrology consulted in account of YUNIOR     Past Medical History:   Diagnosis Date    Aortic insufficiency     Diabetes ([facility] [address]     Hypertension     Mitral insufficiency     PVD (peripheral vascular disease) ([facility] [address]     Systolic heart failure (HCC)     EF 25%      Past Surgical History:   Procedure Laterality Date    HX ARTERIAL BYPASS      Right axillofemoral bypass November 2021     Family History   Family history unknown: Yes      Social History     Tobacco Use    Smoking status: Not on file    Smokeless tobacco: Not on file   Substance Use Topics    Alcohol use: Not on file       Current Facility-Administered Medications   Medication Dose Route Frequency Provider Last Rate Last Admin    LORazepam (ATIVAN) injection 2 mg  2 mg IntraVENous Q4H PRN Tyrell Olivares MD        vancomycin (VANCOCIN) 750 mg in 0.9% sodium chloride 250 mL (VIAL-MATE)  750 mg IntraVENous ONCE Julio López  mL/hr at 12/11/21 1046 750 mg at 12/11/21 1046    sodium bicarbonate (8.4%) 150 mEq in dextrose 5% 1,000 mL infusion   IntraVENous CONTINUOUS Lianet Corbin MD        sodium chloride (NS) flush 5-10 mL  5-10 mL IntraVENous PRN Cornelius Fitzgerald MD        piperacillin-tazobactam (ZOSYN) 3.375 g in 0.9% sodium chloride (MBP/ADV) 100 mL MBP  3.375 g IntraVENous Q8H Cornelius Fitzgerald MD 25 mL/hr at 12/11/21 0923 3.375 g at 12/11/21 0923    levoFLOXacin (LEVAQUIN) 750 mg in D5W IVPB  750 mg IntraVENous Q48H Cornelius Fitzgerald MD   IV Completed at 12/10/21 1826    VANCOMYCIN INFORMATION NOTE   Other Rx Dosing/Monitoring Stan KEEN MD        NOREPINephrine (LEVOPHED) 8 mg in 5% dextrose 250mL (32 mcg/mL) infusion  0.5-16 mcg/min IntraVENous TITRATE Addie Mckinnon MD 9.4 mL/hr at 12/11/21 1037 5 mcg/min at 12/11/21 1037    aspirin (ASA) suppository 300 mg  300 mg Rectal DAILY Kay Lucero MD   300 mg at 12/11/21 4123        Review of Systems   Unable to perform ROS: Other (AMS)       Objective     Vital signs for last 24 hours:  Visit Vitals  BP (!) 80/51 (BP 1 Location: Left upper arm, BP Patient Position: At rest)   Pulse 96   Temp 99.9 °F (37.7 °C)   Resp 26   Ht 5' 6\" (1.676 m)   Wt 77.1 kg (170 lb)   SpO2 100%   BMI 27.44 kg/m²       Intake/Output this shift:  Current Shift: No intake/output data recorded. Last 3 Shifts: 12/09 1901 - 12/11 0700  In: 3063 [I.V.:3063]  Out: 0     Data Review:   Recent Results (from the past 24 hour(s))   TROPONIN-HIGH SENSITIVITY    Collection Time: 12/10/21  2:26 PM   Result Value Ref Range    Troponin-High Sensitivity 1,040 (HH) 0 - 76 ng/L   CBC WITH AUTOMATED DIFF    Collection Time: 12/10/21  2:26 PM   Result Value Ref Range    WBC 22.9 (H) 4.1 - 11.1 K/uL    RBC 3.27 (L) 4.10 - 5.70 M/uL    HGB 9.4 (L) 12.1 - 17.0 g/dL    HCT 29.3 (L) 36.6 - 50.3 %    MCV 89.6 80.0 - 99.0 FL    MCH 28.7 26.0 - 34.0 PG    MCHC 32.1 30.0 - 36.5 g/dL    RDW 16.0 (H) 11.5 - 14.5 %    PLATELET 830 (L) 019 - 400 K/uL    NRBC 1.4 (H) 0.0  WBC    ABSOLUTE NRBC 0.31 (H) 0.00 - 0.01 K/uL    NEUTROPHILS 82 (H) 32 - 75 %    LYMPHOCYTES 12 12 - 49 %    MONOCYTES 6 5 - 13 %    EOSINOPHILS 0 0 - 7 %    BASOPHILS 0 0 - 1 %    IMMATURE GRANULOCYTES 0 %    ABS. NEUTROPHILS 18.8 (H) 1.8 - 8.0 K/UL    ABS. LYMPHOCYTES 2.7 0.8 - 3.5 K/UL    ABS. MONOCYTES 1.4 (H) 0.0 - 1.0 K/UL    ABS. EOSINOPHILS 0.0 0.0 - 0.4 K/UL    ABS. BASOPHILS 0.0 0.0 - 0.1 K/UL    ABS. IMM.  GRANS. 0.0 K/UL    RBC COMMENTS [place] cells  1+        DF Manual METABOLIC PANEL, COMPREHENSIVE    Collection Time: 12/10/21  2:26 PM   Result Value Ref Range    Sodium 131 (L) 136 - 145 mmol/L    Potassium 6.4 (H) 3.5 - 5.1 mmol/L    Chloride 96 (L) 97 - 108 mmol/L    CO2 10 (LL) 21 - 32 mmol/L    Anion gap 25 (H) 5 - 15 mmol/L    Glucose 164 (H) 65 - 100 mg/dL    BUN 90 (H) 6 - 20 mg/dL    Creatinine 2.94 (H) 0.70 - 1.30 mg/dL    BUN/Creatinine ratio 31 (H) 12 - 20      GFR est AA 26 (L) >60 ml/min/1.73m2    GFR est non-AA 22 (L) >60 ml/min/1.73m2    Calcium 8.6 8.5 - 10.1 mg/dL    Bilirubin, total 2.4 (H) 0.2 - 1.0 mg/dL    AST (SGOT) 1,542 (H) 15 - 37 U/L    ALT (SGPT) 971 (H) 12 - 78 U/L    Alk.  phosphatase 497 (H) 45 - 117 U/L    Protein, total 7.2 6.4 - 8.2 g/dL    Albumin 2.6 (L) 3.5 - 5.0 g/dL    Globulin 4.6 (H) 2.0 - 4.0 g/dL    A-G Ratio 0.6 (L) 1.1 - 2.2     LACTIC ACID    Collection Time: 12/10/21  2:26 PM   Result Value Ref Range    Lactic acid 11.6 (HH) 0.4 - 2.0 mmol/L   COVID-19 RAPID TEST    Collection Time: 12/10/21  2:26 PM   Result Value Ref Range    Specimen source Nasopharyngeal      COVID-19 rapid test Not Detected Not Detected     URINALYSIS W/ REFLEX CULTURE    Collection Time: 12/10/21  2:26 PM    Specimen: Urine   Result Value Ref Range    Color Dark Yellow      Appearance Turbid (A) Clear      Specific gravity 1.018 1.003 - 1.030      pH (UA) 5.0 5.0 - 8.0      Protein >300 (A) Negative mg/dL    Glucose 50 (A) Negative mg/dL    Ketone 5 (A) Negative mg/dL    Bilirubin Negative Negative      Blood Large (A) Negative      Urobilinogen 2.0 (H) 0.1 - 1.0 EU/dL    Nitrites Negative Negative      Leukocyte Esterase Negative Negative      WBC 20-50 0 - 4 /hpf    RBC 5-10 0 - 5 /hpf    Bacteria Negative Negative /hpf    UA:UC IF INDICATED Urine Culture Ordered (A) Culture not indicated by UA result      Mucus Trace (A) Negative /lpf    Hyaline cast 2-5 0 - 5 /lpf   BLOOD GAS, ARTERIAL    Collection Time: 12/10/21  3:02 PM   Result Value Ref Range    pH 7. 38 7.35 - 7.45      PCO2 11 (L) 35 - 45 mmHg    PO2 214 (H) 75 - 100 mmHg    O2  >95 %    BICARBONATE 12 (L) 22 - 26 mmol/L    BASE DEFICIT 16.6 (H) 0 - 2 mmol/L    O2 FLOW RATE 15.0 L/min    FIO2 100.0 %    EPAP/CPAP/PEEP 0      SITE Right Radial      LEROY'S TEST PASS     LACTIC ACID    Collection Time: 12/10/21  3:56 PM   Result Value Ref Range    Lactic acid 12.5 (HH) 0.4 - 2.0 mmol/L   METABOLIC PANEL, BASIC    Collection Time: 12/10/21  4:35 PM   Result Value Ref Range    Sodium 132 (L) 136 - 145 mmol/L    Potassium 6.6 (HH) 3.5 - 5.1 mmol/L    Chloride 96 (L) 97 - 108 mmol/L    CO2 10 (LL) 21 - 32 mmol/L    Anion gap 26 (H) 5 - 15 mmol/L    Glucose 144 (H) 65 - 100 mg/dL    BUN 92 (H) 6 - 20 mg/dL    Creatinine 2.91 (H) 0.70 - 1.30 mg/dL    BUN/Creatinine ratio 32 (H) 12 - 20      GFR est AA 26 (L) >60 ml/min/1.73m2    GFR est non-AA 22 (L) >60 ml/min/1.73m2    Calcium 8.2 (L) 8.5 - 10.1 mg/dL   PROCALCITONIN    Collection Time: 12/10/21  5:03 PM   Result Value Ref Range    Procalcitonin 4.55 (H) 0 ng/mL   METABOLIC PANEL, BASIC    Collection Time: 12/10/21  7:17 PM   Result Value Ref Range    Sodium 134 (L) 136 - 145 mmol/L    Potassium 5.7 (H) 3.5 - 5.1 mmol/L    Chloride 100 97 - 108 mmol/L    CO2 12 (LL) 21 - 32 mmol/L    Anion gap 22 (H) 5 - 15 mmol/L    Glucose 148 (H) 65 - 100 mg/dL    BUN 93 (H) 6 - 20 mg/dL    Creatinine 2.98 (H) 0.70 - 1.30 mg/dL    BUN/Creatinine ratio 31 (H) 12 - 20      GFR est AA 26 (L) >60 ml/min/1.73m2    GFR est non-AA 21 (L) >60 ml/min/1.73m2    Calcium 7.1 (L) 8.5 - 10.1 mg/dL   TROPONIN-HIGH SENSITIVITY    Collection Time: 12/10/21  7:17 PM   Result Value Ref Range    Troponin-High Sensitivity 1,591 (HH) 0 - 76 ng/L   LACTIC ACID    Collection Time: 12/10/21  7:17 PM   Result Value Ref Range    Lactic acid 12.3 (HH) 0.4 - 2.0 mmol/L   MRSA SCREEN - PCR (NASAL)    Collection Time: 12/10/21  8:00 PM   Result Value Ref Range    MRSA by PCR, Nasal Not Detected Not Detected     PTT    Collection Time: 12/10/21 10:00 PM   Result Value Ref Range    aPTT 52.5 (H) 21.2 - 34.1 sec    aPTT, therapeutic range   82 - 109 sec   CBC WITH AUTOMATED DIFF    Collection Time: 12/10/21 10:00 PM   Result Value Ref Range    WBC 22.4 (H) 4.1 - 11.1 K/uL    RBC 2.81 (L) 4.10 - 5.70 M/uL    HGB 8.2 (L) 12.1 - 17.0 g/dL    HCT 25.6 (L) 36.6 - 50.3 %    MCV 91.1 80.0 - 99.0 FL    MCH 29.2 26.0 - 34.0 PG    MCHC 32.0 30.0 - 36.5 g/dL    RDW 15.9 (H) 11.5 - 14.5 %    PLATELET 102 (L) 440 - 400 K/uL    NRBC 1.3 (H) 0.0  WBC    ABSOLUTE NRBC 0.30 (H) 0.00 - 0.01 K/uL    NEUTROPHILS 86 (H) 32 - 75 %    LYMPHOCYTES 7 (L) 12 - 49 %    MONOCYTES 7 5 - 13 %    EOSINOPHILS 0 0 - 7 %    BASOPHILS 0 0 - 1 %    IMMATURE GRANULOCYTES 0 %    ABS. NEUTROPHILS 19.2 (H) 1.8 - 8.0 K/UL    ABS. LYMPHOCYTES 1.6 0.8 - 3.5 K/UL    ABS. MONOCYTES 1.6 (H) 0.0 - 1.0 K/UL    ABS. EOSINOPHILS 0.0 0.0 - 0.4 K/UL    ABS. BASOPHILS 0.0 0.0 - 0.1 K/UL    ABS. IMM. GRANS. 0.0 K/UL    DF Manual      RBC COMMENTS Normocytic, Normochromic     GLUCOSE, POC    Collection Time: 12/11/21  1:31 AM   Result Value Ref Range    Glucose (POC) 170 (H) 65 - 117 mg/dL    Performed by Gaston Huynh    CBC WITH AUTOMATED DIFF    Collection Time: 12/11/21  2:50 AM   Result Value Ref Range    WBC 21.3 (H) 4.1 - 11.1 K/uL    RBC 2.80 (L) 4.10 - 5.70 M/uL    HGB 8.2 (L) 12.1 - 17.0 g/dL    HCT 24.5 (L) 36.6 - 50.3 %    MCV 87.5 80.0 - 99.0 FL    MCH 29.3 26.0 - 34.0 PG    MCHC 33.5 30.0 - 36.5 g/dL    RDW 15.7 (H) 11.5 - 14.5 %    PLATELET 734 (L) 249 - 400 K/uL    MPV 13.0 (H) 8.9 - 12.9 FL    NRBC 1.6 (H) 0.0  WBC    ABSOLUTE NRBC 0.34 (H) 0.00 - 0.01 K/uL    DF PENDING     NEUTROPHILS 84 (H) 32 - 75 %    LYMPHOCYTES 11 (L) 12 - 49 %    MONOCYTES 2 (L) 5 - 13 %    EOSINOPHILS 0 0 - 7 %    BASOPHILS 0 0 - 1 %    METAMYELOCYTES 2 (H) 0 %    MYELOCYTES 1 (H) 0 %    NRBC 3.0  WBC    IMMATURE GRANULOCYTES 0 %    ABS.  NEUTROPHILS 18.6 (H) 1.8 - 8.0 K/UL    ABS. LYMPHOCYTES 2.3 0.8 - 3.5 K/UL    ABS. MONOCYTES 0.4 0.0 - 1.0 K/UL    ABS. EOSINOPHILS 0.0 0.0 - 0.4 K/UL    ABS. BASOPHILS 0.0 0.0 - 0.1 K/UL    ABSOLUTE NRBC 0.64 K/uL    ABS. IMM. GRANS. 0.0 K/UL    PLATELET COMMENTS Large Platelets      RBC COMMENTS [place] cells  2+        RBC COMMENTS Polychromasia  1+       HEPATIC FUNCTION PANEL    Collection Time: 12/11/21  2:50 AM   Result Value Ref Range    Protein, total 5.6 (L) 6.4 - 8.2 g/dL    Albumin 1.9 (L) 3.5 - 5.0 g/dL    Globulin 3.7 2.0 - 4.0 g/dL    A-G Ratio 0.5 (L) 1.1 - 2.2      Bilirubin, total 2.2 (H) 0.2 - 1.0 mg/dL    Bilirubin, direct 1.5 (H) 0.0 - 0.2 mg/dL    Alk.  phosphatase 369 (H) 45 - 117 U/L    AST (SGOT) 1,170 (H) 15 - 37 U/L    ALT (SGPT) 721 (H) 12 - 78 U/L   VANCOMYCIN, RANDOM    Collection Time: 12/11/21  2:50 AM   Result Value Ref Range    Vancomycin, random 15.7 ug/mL   RENAL FUNCTION PANEL    Collection Time: 12/11/21  2:50 AM   Result Value Ref Range    Sodium 132 (L) 136 - 145 mmol/L    Potassium 6.1 (H) 3.5 - 5.1 mmol/L    Chloride 96 (L) 97 - 108 mmol/L    CO2 16 (L) 21 - 32 mmol/L    Anion gap 20 (H) 5 - 15 mmol/L    Glucose 245 (H) 65 - 100 mg/dL     (H) 6 - 20 mg/dL    Creatinine 3.43 (H) 0.70 - 1.30 mg/dL    BUN/Creatinine ratio 29 (H) 12 - 20      GFR est AA 22 (L) >60 ml/min/1.73m2    GFR est non-AA 18 (L) >60 ml/min/1.73m2    Calcium 7.0 (L) 8.5 - 10.1 mg/dL    Phosphorus 5.9 (H) 2.6 - 4.7 mg/dL    Albumin 1.9 (L) 3.5 - 5.0 g/dL   MAGNESIUM    Collection Time: 12/11/21  2:50 AM   Result Value Ref Range    Magnesium 2.3 1.6 - 2.4 mg/dL   BLOOD GAS, ARTERIAL    Collection Time: 12/11/21  6:40 AM   Result Value Ref Range    pH 7.49 (H) 7.35 - 7.45      PCO2 17 (L) 35 - 45 mmHg    PO2 138 (H) 75 - 100 mmHg    O2  >95 %    BICARBONATE 17 (L) 22 - 26 mmol/L    BASE DEFICIT 9.1 (H) 0 - 2 mmol/L    O2 METHOD Nasal Cannula      O2 FLOW RATE 6.0 L/min    SITE Left Brachial      LEROY'S TEST PASS      Critical value read back  CALLED TO ICU ABBY PINEDA     GLUCOSE, POC    Collection Time: 21  9:22 AM   Result Value Ref Range    Glucose (POC) 292 (H) 65 - 117 mg/dL    Performed by Nathalia Courtney        Physical Exam  Vitals reviewed. Constitutional:       General: He is not in acute distress. HENT:      Head: Atraumatic. Cardiovascular:      Rate and Rhythm: Normal rate. Pulses: Normal pulses. Heart sounds: No murmur heard. No gallop. Pulmonary:      Effort: Pulmonary effort is normal. No respiratory distress. Breath sounds: No wheezing or rales. Abdominal:      General: Abdomen is flat. Musculoskeletal:      Cervical back: Neck supple. Skin:     General: Skin is warm.    Neurological:      Comments: deferred             Current Facility-Administered Medications:     LORazepam (ATIVAN) injection 2 mg, 2 mg, IntraVENous, Q4H PRN, Tyrell Olivares MD    vancomycin (VANCOCIN) 750 mg in 0.9% sodium chloride 250 mL (VIAL-MATE), 750 mg, IntraVENous, ONCE, Nubia Mcgovern MD, Last Rate: 250 mL/hr at 21 1046, 750 mg at 21 1046    sodium bicarbonate (8.4%) 150 mEq in dextrose 5% 1,000 mL infusion, , IntraVENous, CONTINUOUS, Chris Johnson MD    sodium chloride (NS) flush 5-10 mL, 5-10 mL, IntraVENous, PRN, Holley Mckeon MD    piperacillin-tazobactam (ZOSYN) 3.375 g in 0.9% sodium chloride (MBP/ADV) 100 mL MBP, 3.375 g, IntraVENous, Q8H, Holley Camejo MD, Last Rate: 25 mL/hr at 21 0923, 3.375 g at 21 0923    levoFLOXacin (LEVAQUIN) 750 mg in D5W IVPB, 750 mg, IntraVENous, Q48H, Holley Camejo MD, IV Completed at 12/10/21 1826    VANCOMYCIN INFORMATION NOTE, , Other, Rx Dosing/Monitoring, Holley Camejo MD    NOREPINephrine (LEVOPHED) 8 mg in 5% dextrose 250mL (32 mcg/mL) infusion, 0.5-16 mcg/min, IntraVENous, TITRATE, [place], Kiara Shackle, MD, Last Rate: 9.4 mL/hr at 21 1037, 5 mcg/min at 21 1037    aspirin (ASA) suppository 300 mg, 300 mg, Rectal, DAILY, Gil Gamez MD, 300 mg at 12/11/21 2925    Recent Results (from the past 24 hour(s))   TROPONIN-HIGH SENSITIVITY    Collection Time: 12/10/21  2:26 PM   Result Value Ref Range    Troponin-High Sensitivity 1,040 (HH) 0 - 76 ng/L   CBC WITH AUTOMATED DIFF    Collection Time: 12/10/21  2:26 PM   Result Value Ref Range    WBC 22.9 (H) 4.1 - 11.1 K/uL    RBC 3.27 (L) 4.10 - 5.70 M/uL    HGB 9.4 (L) 12.1 - 17.0 g/dL    HCT 29.3 (L) 36.6 - 50.3 %    MCV 89.6 80.0 - 99.0 FL    MCH 28.7 26.0 - 34.0 PG    MCHC 32.1 30.0 - 36.5 g/dL    RDW 16.0 (H) 11.5 - 14.5 %    PLATELET 800 (L) 728 - 400 K/uL    NRBC 1.4 (H) 0.0  WBC    ABSOLUTE NRBC 0.31 (H) 0.00 - 0.01 K/uL    NEUTROPHILS 82 (H) 32 - 75 %    LYMPHOCYTES 12 12 - 49 %    MONOCYTES 6 5 - 13 %    EOSINOPHILS 0 0 - 7 %    BASOPHILS 0 0 - 1 %    IMMATURE GRANULOCYTES 0 %    ABS. NEUTROPHILS 18.8 (H) 1.8 - 8.0 K/UL    ABS. LYMPHOCYTES 2.7 0.8 - 3.5 K/UL    ABS. MONOCYTES 1.4 (H) 0.0 - 1.0 K/UL    ABS. EOSINOPHILS 0.0 0.0 - 0.4 K/UL    ABS. BASOPHILS 0.0 0.0 - 0.1 K/UL    ABS. IMM. GRANS. 0.0 K/UL    RBC COMMENTS [place] cells  1+        DF Manual     METABOLIC PANEL, COMPREHENSIVE    Collection Time: 12/10/21  2:26 PM   Result Value Ref Range    Sodium 131 (L) 136 - 145 mmol/L    Potassium 6.4 (H) 3.5 - 5.1 mmol/L    Chloride 96 (L) 97 - 108 mmol/L    CO2 10 (LL) 21 - 32 mmol/L    Anion gap 25 (H) 5 - 15 mmol/L    Glucose 164 (H) 65 - 100 mg/dL    BUN 90 (H) 6 - 20 mg/dL    Creatinine 2.94 (H) 0.70 - 1.30 mg/dL    BUN/Creatinine ratio 31 (H) 12 - 20      GFR est AA 26 (L) >60 ml/min/1.73m2    GFR est non-AA 22 (L) >60 ml/min/1.73m2    Calcium 8.6 8.5 - 10.1 mg/dL    Bilirubin, total 2.4 (H) 0.2 - 1.0 mg/dL    AST (SGOT) 1,542 (H) 15 - 37 U/L    ALT (SGPT) 971 (H) 12 - 78 U/L    Alk.  phosphatase 497 (H) 45 - 117 U/L    Protein, total 7.2 6.4 - 8.2 g/dL    Albumin 2.6 (L) 3.5 - 5.0 g/dL    Globulin 4.6 (H) 2.0 - 4.0 g/dL    A-G Ratio 0.6 (L) 1.1 - 2.2     LACTIC ACID    Collection Time: 12/10/21  2:26 PM   Result Value Ref Range    Lactic acid 11.6 (HH) 0.4 - 2.0 mmol/L   COVID-19 RAPID TEST    Collection Time: 12/10/21  2:26 PM   Result Value Ref Range    Specimen source Nasopharyngeal      COVID-19 rapid test Not Detected Not Detected     URINALYSIS W/ REFLEX CULTURE    Collection Time: 12/10/21  2:26 PM    Specimen: Urine   Result Value Ref Range    Color Dark Yellow      Appearance Turbid (A) Clear      Specific gravity 1.018 1.003 - 1.030      pH (UA) 5.0 5.0 - 8.0      Protein >300 (A) Negative mg/dL    Glucose 50 (A) Negative mg/dL    Ketone 5 (A) Negative mg/dL    Bilirubin Negative Negative      Blood Large (A) Negative      Urobilinogen 2.0 (H) 0.1 - 1.0 EU/dL    Nitrites Negative Negative      Leukocyte Esterase Negative Negative      WBC 20-50 0 - 4 /hpf    RBC 5-10 0 - 5 /hpf    Bacteria Negative Negative /hpf    UA:UC IF INDICATED Urine Culture Ordered (A) Culture not indicated by UA result      Mucus Trace (A) Negative /lpf    Hyaline cast 2-5 0 - 5 /lpf   BLOOD GAS, ARTERIAL    Collection Time: 12/10/21  3:02 PM   Result Value Ref Range    pH 7.38 7.35 - 7.45      PCO2 11 (L) 35 - 45 mmHg    PO2 214 (H) 75 - 100 mmHg    O2  >95 %    BICARBONATE 12 (L) 22 - 26 mmol/L    BASE DEFICIT 16.6 (H) 0 - 2 mmol/L    O2 FLOW RATE 15.0 L/min    FIO2 100.0 %    EPAP/CPAP/PEEP 0      SITE Right Radial      LEROY'S TEST PASS     LACTIC ACID    Collection Time: 12/10/21  3:56 PM   Result Value Ref Range    Lactic acid 12.5 (HH) 0.4 - 2.0 mmol/L   METABOLIC PANEL, BASIC    Collection Time: 12/10/21  4:35 PM   Result Value Ref Range    Sodium 132 (L) 136 - 145 mmol/L    Potassium 6.6 (HH) 3.5 - 5.1 mmol/L    Chloride 96 (L) 97 - 108 mmol/L    CO2 10 (LL) 21 - 32 mmol/L    Anion gap 26 (H) 5 - 15 mmol/L    Glucose 144 (H) 65 - 100 mg/dL    BUN 92 (H) 6 - 20 mg/dL    Creatinine 2.91 (H) 0.70 - 1.30 mg/dL    BUN/Creatinine ratio 32 (H) 12 - 20      GFR est AA 26 (L) >60 ml/min/1.73m2    GFR est non-AA 22 (L) >60 ml/min/1.73m2    Calcium 8.2 (L) 8.5 - 10.1 mg/dL   PROCALCITONIN    Collection Time: 12/10/21  5:03 PM   Result Value Ref Range    Procalcitonin 4.55 (H) 0 ng/mL   METABOLIC PANEL, BASIC    Collection Time: 12/10/21  7:17 PM   Result Value Ref Range    Sodium 134 (L) 136 - 145 mmol/L    Potassium 5.7 (H) 3.5 - 5.1 mmol/L    Chloride 100 97 - 108 mmol/L    CO2 12 (LL) 21 - 32 mmol/L    Anion gap 22 (H) 5 - 15 mmol/L    Glucose 148 (H) 65 - 100 mg/dL    BUN 93 (H) 6 - 20 mg/dL    Creatinine 2.98 (H) 0.70 - 1.30 mg/dL    BUN/Creatinine ratio 31 (H) 12 - 20      GFR est AA 26 (L) >60 ml/min/1.73m2    GFR est non-AA 21 (L) >60 ml/min/1.73m2    Calcium 7.1 (L) 8.5 - 10.1 mg/dL   TROPONIN-HIGH SENSITIVITY    Collection Time: 12/10/21  7:17 PM   Result Value Ref Range    Troponin-High Sensitivity 1,591 (HH) 0 - 76 ng/L   LACTIC ACID    Collection Time: 12/10/21  7:17 PM   Result Value Ref Range    Lactic acid 12.3 (HH) 0.4 - 2.0 mmol/L   MRSA SCREEN - PCR (NASAL)    Collection Time: 12/10/21  8:00 PM   Result Value Ref Range    MRSA by PCR, Nasal Not Detected Not Detected     PTT    Collection Time: 12/10/21 10:00 PM   Result Value Ref Range    aPTT 52.5 (H) 21.2 - 34.1 sec    aPTT, therapeutic range   82 - 109 sec   CBC WITH AUTOMATED DIFF    Collection Time: 12/10/21 10:00 PM   Result Value Ref Range    WBC 22.4 (H) 4.1 - 11.1 K/uL    RBC 2.81 (L) 4.10 - 5.70 M/uL    HGB 8.2 (L) 12.1 - 17.0 g/dL    HCT 25.6 (L) 36.6 - 50.3 %    MCV 91.1 80.0 - 99.0 FL    MCH 29.2 26.0 - 34.0 PG    MCHC 32.0 30.0 - 36.5 g/dL    RDW 15.9 (H) 11.5 - 14.5 %    PLATELET 696 (L) 894 - 400 K/uL    NRBC 1.3 (H) 0.0  WBC    ABSOLUTE NRBC 0.30 (H) 0.00 - 0.01 K/uL    NEUTROPHILS 86 (H) 32 - 75 %    LYMPHOCYTES 7 (L) 12 - 49 %    MONOCYTES 7 5 - 13 %    EOSINOPHILS 0 0 - 7 %    BASOPHILS 0 0 - 1 %    IMMATURE GRANULOCYTES 0 %    ABS.  NEUTROPHILS 19.2 (H) 1.8 - 8.0 K/UL    ABS. LYMPHOCYTES 1.6 0.8 - 3.5 K/UL    ABS. MONOCYTES 1.6 (H) 0.0 - 1.0 K/UL    ABS. EOSINOPHILS 0.0 0.0 - 0.4 K/UL    ABS. BASOPHILS 0.0 0.0 - 0.1 K/UL    ABS. IMM. GRANS. 0.0 K/UL    DF Manual      RBC COMMENTS Normocytic, Normochromic     GLUCOSE, POC    Collection Time: 12/11/21  1:31 AM   Result Value Ref Range    Glucose (POC) 170 (H) 65 - 117 mg/dL    Performed by Jason Tran    CBC WITH AUTOMATED DIFF    Collection Time: 12/11/21  2:50 AM   Result Value Ref Range    WBC 21.3 (H) 4.1 - 11.1 K/uL    RBC 2.80 (L) 4.10 - 5.70 M/uL    HGB 8.2 (L) 12.1 - 17.0 g/dL    HCT 24.5 (L) 36.6 - 50.3 %    MCV 87.5 80.0 - 99.0 FL    MCH 29.3 26.0 - 34.0 PG    MCHC 33.5 30.0 - 36.5 g/dL    RDW 15.7 (H) 11.5 - 14.5 %    PLATELET 082 (L) 987 - 400 K/uL    MPV 13.0 (H) 8.9 - 12.9 FL    NRBC 1.6 (H) 0.0  WBC    ABSOLUTE NRBC 0.34 (H) 0.00 - 0.01 K/uL    DF PENDING     NEUTROPHILS 84 (H) 32 - 75 %    LYMPHOCYTES 11 (L) 12 - 49 %    MONOCYTES 2 (L) 5 - 13 %    EOSINOPHILS 0 0 - 7 %    BASOPHILS 0 0 - 1 %    METAMYELOCYTES 2 (H) 0 %    MYELOCYTES 1 (H) 0 %    NRBC 3.0  WBC    IMMATURE GRANULOCYTES 0 %    ABS. NEUTROPHILS 18.6 (H) 1.8 - 8.0 K/UL    ABS. LYMPHOCYTES 2.3 0.8 - 3.5 K/UL    ABS. MONOCYTES 0.4 0.0 - 1.0 K/UL    ABS. EOSINOPHILS 0.0 0.0 - 0.4 K/UL    ABS. BASOPHILS 0.0 0.0 - 0.1 K/UL    ABSOLUTE NRBC 0.64 K/uL    ABS. IMM. GRANS. 0.0 K/UL    PLATELET COMMENTS Large Platelets      RBC COMMENTS Ruchi cells  2+        RBC COMMENTS Polychromasia  1+       HEPATIC FUNCTION PANEL    Collection Time: 12/11/21  2:50 AM   Result Value Ref Range    Protein, total 5.6 (L) 6.4 - 8.2 g/dL    Albumin 1.9 (L) 3.5 - 5.0 g/dL    Globulin 3.7 2.0 - 4.0 g/dL    A-G Ratio 0.5 (L) 1.1 - 2.2      Bilirubin, total 2.2 (H) 0.2 - 1.0 mg/dL    Bilirubin, direct 1.5 (H) 0.0 - 0.2 mg/dL    Alk.  phosphatase 369 (H) 45 - 117 U/L    AST (SGOT) 1,170 (H) 15 - 37 U/L    ALT (SGPT) 721 (H) 12 - 78 U/L   VANCOMYCIN, RANDOM Collection Time: 12/11/21  2:50 AM   Result Value Ref Range    Vancomycin, random 15.7 ug/mL   RENAL FUNCTION PANEL    Collection Time: 12/11/21  2:50 AM   Result Value Ref Range    Sodium 132 (L) 136 - 145 mmol/L    Potassium 6.1 (H) 3.5 - 5.1 mmol/L    Chloride 96 (L) 97 - 108 mmol/L    CO2 16 (L) 21 - 32 mmol/L    Anion gap 20 (H) 5 - 15 mmol/L    Glucose 245 (H) 65 - 100 mg/dL     (H) 6 - 20 mg/dL    Creatinine 3.43 (H) 0.70 - 1.30 mg/dL    BUN/Creatinine ratio 29 (H) 12 - 20      GFR est AA 22 (L) >60 ml/min/1.73m2    GFR est non-AA 18 (L) >60 ml/min/1.73m2    Calcium 7.0 (L) 8.5 - 10.1 mg/dL    Phosphorus 5.9 (H) 2.6 - 4.7 mg/dL    Albumin 1.9 (L) 3.5 - 5.0 g/dL   MAGNESIUM    Collection Time: 12/11/21  2:50 AM   Result Value Ref Range    Magnesium 2.3 1.6 - 2.4 mg/dL   BLOOD GAS, ARTERIAL    Collection Time: 12/11/21  6:40 AM   Result Value Ref Range    pH 7.49 (H) 7.35 - 7.45      PCO2 17 (L) 35 - 45 mmHg    PO2 138 (H) 75 - 100 mmHg    O2  >95 %    BICARBONATE 17 (L) 22 - 26 mmol/L    BASE DEFICIT 9.1 (H) 0 - 2 mmol/L    O2 METHOD Nasal Cannula      O2 FLOW RATE 6.0 L/min    SITE Left Brachial      LEROY'S TEST PASS      Critical value read back  CALLED TO ICU ABBY PINEDA     GLUCOSE, POC    Collection Time: 12/11/21  9:22 AM   Result Value Ref Range    Glucose (POC) 292 (H) 65 - 117 mg/dL    Performed by Nae SALVADOR        Assessment       Plan          1. Ischemic ATN sec to multiple renal insults- Scr at present 3.43  C/w strict I/O for additional monitoring  Adjust all meds to current GFR  Continue to avoid nephrotoxins as much as possible  C/w sodium bicarb infusion at current rate for adequate volume expansion  Daily BMP for additional monitoring  If no improvement noted  May need CRRT modalities for additional management      2. Septic shock - titrate vasopressors to maintain MAP > 65  C/w broad spectrum antibiotics renally adjusted     3.  Toxic metabolic encephalopathy- multifactorial   Rest of ID pertinent work up as per primary team      4 . Left MCA distribution large stroke - rest of management as per neurology     5. NSTEMI - management as per cardiology    6.  MODS - prognosis guarded

## 2021-12-11 NOTE — ED NOTES
Assumed care of pt. Pt arouses to verbal stimuli. Pt does not answer questions. No acute distress. No respiratory distress.

## 2021-12-11 NOTE — PROGRESS NOTES
Hospitalist Progress Note         Lisa Bell MD          Daily Progress Note: 12/11/2021      Subjective: The patient is seen for follow  up.  77 y.o. male with severe peripheral vascular disease, systolic heart failure, diabetes and hypertension. He was admitted to [facility] in mid November and found to have an infrarenal abdominal aortic occlusion and ended up undergoing a right axillofemoral bypass procedure. Echo during that stay showed an EF of 25% along with severe mitral insufficiency and moderate aortic insufficiency. He was discharged to a nursing home on 11/18     Patient presents to the ED today from [facility] with hypoxia and altered mental status. Pressure was 85/60 on admission with a respiratory rate of 33 and a heart rate of 80. Patient seen in follow-up. CT brain showed a large left-sided evolving acute MCA stroke. Patient is completely flaccid on the right side.   Systolic blood pressure remains in the 80s and 90s on Levophed    Problem List:  Problem List as of 12/11/2021 Date Reviewed: 11/9/2021          Codes Class Noted - Resolved    Septic shock [facility]) ICD-10-CM: A41.9, R65.21  ICD-9-CM: 038.9, 785.52, 995.92  12/10/2021 - Present        PAD [facility]peripheral artery disease) ([facility] ICD-10-CM: I73.9  ICD-9-CM: 443.9  11/7/2021 - Present              Medications reviewed  Current Facility-Administered Medications   Medication Dose Route Frequency    LORazepam (ATIVAN) injection 2 mg  2 mg IntraVENous Q4H PRN    vancomycin (VANCOCIN) 750 mg in 0.9% sodium chloride 250 mL (VIAL-MATE)  750 mg IntraVENous ONCE    sodium bicarbonate (8.4%) 150 mEq in dextrose 5% 1,000 mL infusion   IntraVENous CONTINUOUS    sodium chloride (NS) flush 5-10 mL  5-10 mL IntraVENous PRN    piperacillin-tazobactam (ZOSYN) 3.375 g in 0.9% sodium chloride (MBP/ADV) 100 mL MBP  3.375 g IntraVENous Q8H    levoFLOXacin (LEVAQUIN) 750 mg in D5W IVPB  750 mg IntraVENous Q48H    VANCOMYCIN INFORMATION NOTE   Other Rx Dosing/Monitoring    NOREPINephrine (LEVOPHED) 8 mg in 5% dextrose 250mL (32 mcg/mL) infusion  0.5-16 mcg/min IntraVENous TITRATE    aspirin (ASA) suppository 300 mg  300 mg Rectal DAILY       Review of Systems:   Review of systems not obtained due to patient factors. Objective:   Physical Exam:     Visit Vitals  BP (!) 80/51 (BP 1 Location: Left upper arm, BP Patient Position: At rest)   Pulse 96   Temp 99.9 °F (37.7 °C)   Resp 26   Ht 5' 6\" (1.676 m)   Wt 77.1 kg (170 lb)   SpO2 100%   BMI 27.44 kg/m²    O2 Flow Rate (L/min): 6 l/min O2 Device: Nasal cannula    Temp (24hrs), Av.3 °F (36.8 °C), Min:94.8 °F (34.9 °C), Max:99.9 °F (37.7 °C)    No intake/output data recorded.  1901 -  0700  In: 3063 [I.V.:3063]  Out: 0     General:  Awake but unresponsive to verbal stimuli   Lungs:   Clear to auscultation bilaterally. Chest wall:  No tenderness or deformity. Heart:  Regular rate and rhythm, S1, S2 normal, no murmur, click, rub or gallop. Abdomen:   Soft, non-tender. Bowel sounds normal. No masses,  No organomegaly. Extremities: Right sided flaccidity   Pulses: 2+ and symmetric all extremities. Skin: Skin color, texture, turgor normal. No rashes or lesions   Neurologic: CNII-XII intact.  No gross sensory or motor deficits     Data Review:       Recent Days:  Recent Labs     21  0250 12/10/21  2200 12/10/21  1426   WBC 21.3* 22.4* 22.9*   HGB 8.2* 8.2* 9.4*   HCT 24.5* 25.6* 29.3*   * 113* 121*     Recent Labs     21  0250 12/10/21  1917 12/10/21  1635 12/10/21  1426 12/10/21  1426   * 134* 132*   < > 131*   K 6.1* 5.7* 6.6*   < > 6.4*   CL 96* 100 96*   < > 96*   CO2 16* 12* 10*   < > 10*   * 148* 144*   < > 164*   * 93* 92*   < > 90*   CREA 3.43* 2.98* 2.91*   < > 2.94*   CA 7.0* 7.1* 8.2*   < > 8.6   MG 2.3  --   --   --   --    PHOS 5.9*  --   --   --   --    ALB 1.9*  1.9* --   --   --  2.6*   TBILI 2.2*  --   --   --  2.4*   *  --   --   --  971*    < > = values in this interval not displayed. Recent Labs     12/11/21  0640 12/10/21  1502   PH 7.49* 7.38   PCO2 17* 11*   PO2 138* 214*   HCO3 17* 12*   FIO2  --  100.0       24 Hour Results:  Recent Results (from the past 24 hour(s))   TROPONIN-HIGH SENSITIVITY    Collection Time: 12/10/21  2:26 PM   Result Value Ref Range    Troponin-High Sensitivity 1,040 (HH) 0 - 76 ng/L   CBC WITH AUTOMATED DIFF    Collection Time: 12/10/21  2:26 PM   Result Value Ref Range    WBC 22.9 (H) 4.1 - 11.1 K/uL    RBC 3.27 (L) 4.10 - 5.70 M/uL    HGB 9.4 (L) 12.1 - 17.0 g/dL    HCT 29.3 (L) 36.6 - 50.3 %    MCV 89.6 80.0 - 99.0 FL    MCH 28.7 26.0 - 34.0 PG    MCHC 32.1 30.0 - 36.5 g/dL    RDW 16.0 (H) 11.5 - 14.5 %    PLATELET 897 (L) 783 - 400 K/uL    NRBC 1.4 (H) 0.0  WBC    ABSOLUTE NRBC 0.31 (H) 0.00 - 0.01 K/uL    NEUTROPHILS 82 (H) 32 - 75 %    LYMPHOCYTES 12 12 - 49 %    MONOCYTES 6 5 - 13 %    EOSINOPHILS 0 0 - 7 %    BASOPHILS 0 0 - 1 %    IMMATURE GRANULOCYTES 0 %    ABS. NEUTROPHILS 18.8 (H) 1.8 - 8.0 K/UL    ABS. LYMPHOCYTES 2.7 0.8 - 3.5 K/UL    ABS. MONOCYTES 1.4 (H) 0.0 - 1.0 K/UL    ABS. EOSINOPHILS 0.0 0.0 - 0.4 K/UL    ABS. BASOPHILS 0.0 0.0 - 0.1 K/UL    ABS. IMM.  GRANS. 0.0 K/UL    RBC COMMENTS Ruchi cells  1+        DF Manual     METABOLIC PANEL, COMPREHENSIVE    Collection Time: 12/10/21  2:26 PM   Result Value Ref Range    Sodium 131 (L) 136 - 145 mmol/L    Potassium 6.4 (H) 3.5 - 5.1 mmol/L    Chloride 96 (L) 97 - 108 mmol/L    CO2 10 (LL) 21 - 32 mmol/L    Anion gap 25 (H) 5 - 15 mmol/L    Glucose 164 (H) 65 - 100 mg/dL    BUN 90 (H) 6 - 20 mg/dL    Creatinine 2.94 (H) 0.70 - 1.30 mg/dL    BUN/Creatinine ratio 31 (H) 12 - 20      GFR est AA 26 (L) >60 ml/min/1.73m2    GFR est non-AA 22 (L) >60 ml/min/1.73m2    Calcium 8.6 8.5 - 10.1 mg/dL    Bilirubin, total 2.4 (H) 0.2 - 1.0 mg/dL    AST (SGOT) 1,542 (H) 15 - 37 U/L    ALT (SGPT) 971 (H) 12 - 78 U/L    Alk.  phosphatase 497 (H) 45 - 117 U/L    Protein, total 7.2 6.4 - 8.2 g/dL    Albumin 2.6 (L) 3.5 - 5.0 g/dL    Globulin 4.6 (H) 2.0 - 4.0 g/dL    A-G Ratio 0.6 (L) 1.1 - 2.2     LACTIC ACID    Collection Time: 12/10/21  2:26 PM   Result Value Ref Range    Lactic acid 11.6 (HH) 0.4 - 2.0 mmol/L   COVID-19 RAPID TEST    Collection Time: 12/10/21  2:26 PM   Result Value Ref Range    Specimen source Nasopharyngeal      COVID-19 rapid test Not Detected Not Detected     URINALYSIS W/ REFLEX CULTURE    Collection Time: 12/10/21  2:26 PM    Specimen: Urine   Result Value Ref Range    Color Dark Yellow      Appearance Turbid (A) Clear      Specific gravity 1.018 1.003 - 1.030      pH (UA) 5.0 5.0 - 8.0      Protein >300 (A) Negative mg/dL    Glucose 50 (A) Negative mg/dL    Ketone 5 (A) Negative mg/dL    Bilirubin Negative Negative      Blood Large (A) Negative      Urobilinogen 2.0 (H) 0.1 - 1.0 EU/dL    Nitrites Negative Negative      Leukocyte Esterase Negative Negative      WBC 20-50 0 - 4 /hpf    RBC 5-10 0 - 5 /hpf    Bacteria Negative Negative /hpf    UA:UC IF INDICATED Urine Culture Ordered (A) Culture not indicated by UA result      Mucus Trace (A) Negative /lpf    Hyaline cast 2-5 0 - 5 /lpf   BLOOD GAS, ARTERIAL    Collection Time: 12/10/21  3:02 PM   Result Value Ref Range    pH 7.38 7.35 - 7.45      PCO2 11 (L) 35 - 45 mmHg    PO2 214 (H) 75 - 100 mmHg    O2  >95 %    BICARBONATE 12 (L) 22 - 26 mmol/L    BASE DEFICIT 16.6 (H) 0 - 2 mmol/L    O2 FLOW RATE 15.0 L/min    FIO2 100.0 %    EPAP/CPAP/PEEP 0      SITE Right Radial      LEROY'S TEST PASS     LACTIC ACID    Collection Time: 12/10/21  3:56 PM   Result Value Ref Range    Lactic acid 12.5 (HH) 0.4 - 2.0 mmol/L   METABOLIC PANEL, BASIC    Collection Time: 12/10/21  4:35 PM   Result Value Ref Range    Sodium 132 (L) 136 - 145 mmol/L    Potassium 6.6 (HH) 3.5 - 5.1 mmol/L    Chloride 96 (L) 97 - 108 mmol/L CO2 10 (LL) 21 - 32 mmol/L    Anion gap 26 (H) 5 - 15 mmol/L    Glucose 144 (H) 65 - 100 mg/dL    BUN 92 (H) 6 - 20 mg/dL    Creatinine 2.91 (H) 0.70 - 1.30 mg/dL    BUN/Creatinine ratio 32 (H) 12 - 20      GFR est AA 26 (L) >60 ml/min/1.73m2    GFR est non-AA 22 (L) >60 ml/min/1.73m2    Calcium 8.2 (L) 8.5 - 10.1 mg/dL   PROCALCITONIN    Collection Time: 12/10/21  5:03 PM   Result Value Ref Range    Procalcitonin 4.55 (H) 0 ng/mL   METABOLIC PANEL, BASIC    Collection Time: 12/10/21  7:17 PM   Result Value Ref Range    Sodium 134 (L) 136 - 145 mmol/L    Potassium 5.7 (H) 3.5 - 5.1 mmol/L    Chloride 100 97 - 108 mmol/L    CO2 12 (LL) 21 - 32 mmol/L    Anion gap 22 (H) 5 - 15 mmol/L    Glucose 148 (H) 65 - 100 mg/dL    BUN 93 (H) 6 - 20 mg/dL    Creatinine 2.98 (H) 0.70 - 1.30 mg/dL    BUN/Creatinine ratio 31 (H) 12 - 20      GFR est AA 26 (L) >60 ml/min/1.73m2    GFR est non-AA 21 (L) >60 ml/min/1.73m2    Calcium 7.1 (L) 8.5 - 10.1 mg/dL   TROPONIN-HIGH SENSITIVITY    Collection Time: 12/10/21  7:17 PM   Result Value Ref Range    Troponin-High Sensitivity 1,591 (HH) 0 - 76 ng/L   LACTIC ACID    Collection Time: 12/10/21  7:17 PM   Result Value Ref Range    Lactic acid 12.3 (HH) 0.4 - 2.0 mmol/L   MRSA SCREEN - PCR (NASAL)    Collection Time: 12/10/21  8:00 PM   Result Value Ref Range    MRSA by PCR, Nasal Not Detected Not Detected     PTT    Collection Time: 12/10/21 10:00 PM   Result Value Ref Range    aPTT 52.5 (H) 21.2 - 34.1 sec    aPTT, therapeutic range   82 - 109 sec   CBC WITH AUTOMATED DIFF    Collection Time: 12/10/21 10:00 PM   Result Value Ref Range    WBC 22.4 (H) 4.1 - 11.1 K/uL    RBC 2.81 (L) 4.10 - 5.70 M/uL    HGB 8.2 (L) 12.1 - 17.0 g/dL    HCT 25.6 (L) 36.6 - 50.3 %    MCV 91.1 80.0 - 99.0 FL    MCH 29.2 26.0 - 34.0 PG    MCHC 32.0 30.0 - 36.5 g/dL    RDW 15.9 (H) 11.5 - 14.5 %    PLATELET 085 (L) 859 - 400 K/uL    NRBC 1.3 (H) 0.0  WBC    ABSOLUTE NRBC 0.30 (H) 0.00 - 0.01 K/uL NEUTROPHILS 86 (H) 32 - 75 %    LYMPHOCYTES 7 (L) 12 - 49 %    MONOCYTES 7 5 - 13 %    EOSINOPHILS 0 0 - 7 %    BASOPHILS 0 0 - 1 %    IMMATURE GRANULOCYTES 0 %    ABS. NEUTROPHILS 19.2 (H) 1.8 - 8.0 K/UL    ABS. LYMPHOCYTES 1.6 0.8 - 3.5 K/UL    ABS. MONOCYTES 1.6 (H) 0.0 - 1.0 K/UL    ABS. EOSINOPHILS 0.0 0.0 - 0.4 K/UL    ABS. BASOPHILS 0.0 0.0 - 0.1 K/UL    ABS. IMM. GRANS. 0.0 K/UL    DF Manual      RBC COMMENTS Normocytic, Normochromic     GLUCOSE, POC    Collection Time: 12/11/21  1:31 AM   Result Value Ref Range    Glucose (POC) 170 (H) 65 - 117 mg/dL    Performed by Lenin [facility]    CBC WITH AUTOMATED DIFF    Collection Time: 12/11/21  2:50 AM   Result Value Ref Range    WBC 21.3 (H) 4.1 - 11.1 K/uL    RBC 2.80 (L) 4.10 - 5.70 M/uL    HGB 8.2 (L) 12.1 - 17.0 g/dL    HCT 24.5 (L) 36.6 - 50.3 %    MCV 87.5 80.0 - 99.0 FL    MCH 29.3 26.0 - 34.0 PG    MCHC 33.5 30.0 - 36.5 g/dL    RDW 15.7 (H) 11.5 - 14.5 %    PLATELET 735 (L) 801 - 400 K/uL    MPV 13.0 (H) 8.9 - 12.9 FL    NRBC 1.6 (H) 0.0  WBC    ABSOLUTE NRBC 0.34 (H) 0.00 - 0.01 K/uL    DF PENDING     NEUTROPHILS 84 (H) 32 - 75 %    LYMPHOCYTES 11 (L) 12 - 49 %    MONOCYTES 2 (L) 5 - 13 %    EOSINOPHILS 0 0 - 7 %    BASOPHILS 0 0 - 1 %    METAMYELOCYTES 2 (H) 0 %    MYELOCYTES 1 (H) 0 %    NRBC 3.0  WBC    IMMATURE GRANULOCYTES 0 %    ABS. NEUTROPHILS 18.6 (H) 1.8 - 8.0 K/UL    ABS. LYMPHOCYTES 2.3 0.8 - 3.5 K/UL    ABS. MONOCYTES 0.4 0.0 - 1.0 K/UL    ABS. EOSINOPHILS 0.0 0.0 - 0.4 K/UL    ABS. BASOPHILS 0.0 0.0 - 0.1 K/UL    ABSOLUTE NRBC 0.64 K/uL    ABS. IMM.  GRANS. 0.0 K/UL    PLATELET COMMENTS Large Platelets      RBC COMMENTS [place] cells  2+        RBC COMMENTS Polychromasia  1+       HEPATIC FUNCTION PANEL    Collection Time: 12/11/21  2:50 AM   Result Value Ref Range    Protein, total 5.6 (L) 6.4 - 8.2 g/dL    Albumin 1.9 (L) 3.5 - 5.0 g/dL    Globulin 3.7 2.0 - 4.0 g/dL    A-G Ratio 0.5 (L) 1.1 - 2.2      Bilirubin, total 2.2 (H) 0.2 - 1.0 mg/dL    Bilirubin, direct 1.5 (H) 0.0 - 0.2 mg/dL    Alk. phosphatase 369 (H) 45 - 117 U/L    AST (SGOT) 1,170 (H) 15 - 37 U/L    ALT (SGPT) 721 (H) 12 - 78 U/L   VANCOMYCIN, RANDOM    Collection Time: 12/11/21  2:50 AM   Result Value Ref Range    Vancomycin, random 15.7 ug/mL   RENAL FUNCTION PANEL    Collection Time: 12/11/21  2:50 AM   Result Value Ref Range    Sodium 132 (L) 136 - 145 mmol/L    Potassium 6.1 (H) 3.5 - 5.1 mmol/L    Chloride 96 (L) 97 - 108 mmol/L    CO2 16 (L) 21 - 32 mmol/L    Anion gap 20 (H) 5 - 15 mmol/L    Glucose 245 (H) 65 - 100 mg/dL     (H) 6 - 20 mg/dL    Creatinine 3.43 (H) 0.70 - 1.30 mg/dL    BUN/Creatinine ratio 29 (H) 12 - 20      GFR est AA 22 (L) >60 ml/min/1.73m2    GFR est non-AA 18 (L) >60 ml/min/1.73m2    Calcium 7.0 (L) 8.5 - 10.1 mg/dL    Phosphorus 5.9 (H) 2.6 - 4.7 mg/dL    Albumin 1.9 (L) 3.5 - 5.0 g/dL   MAGNESIUM    Collection Time: 12/11/21  2:50 AM   Result Value Ref Range    Magnesium 2.3 1.6 - 2.4 mg/dL   BLOOD GAS, ARTERIAL    Collection Time: 12/11/21  6:40 AM   Result Value Ref Range    pH 7.49 (H) 7.35 - 7.45      PCO2 17 (L) 35 - 45 mmHg    PO2 138 (H) 75 - 100 mmHg    O2  >95 %    BICARBONATE 17 (L) 22 - 26 mmol/L    BASE DEFICIT 9.1 (H) 0 - 2 mmol/L    O2 METHOD Nasal Cannula      O2 FLOW RATE 6.0 L/min    SITE Left Brachial      LEROY'S TEST PASS      Critical value read back  CALLED TO ICU ABBY PINEDA     GLUCOSE, POC    Collection Time: 12/11/21  9:22 AM   Result Value Ref Range    Glucose (POC) 292 (H) 65 - 117 mg/dL    Performed by Thaddeus Chou [place]      CT HEAD WO CONT   Final Result      Evolving large recent left MCA infarct. No acute intracranial bleed. Atherosclerosis. Follow-up as clinically indicated. CT HEAD WO CONT   Final Result   No change compared to CT head November 7, 2021. Same distribution   left cerebral encephalomalacia.       XR CHEST PORT   Final Result      US RETROPERITONEUM COMP    (Results Pending) Assessment/     Severe sepsis with septic shock, possibly due to UTI     Septic and metabolic encephalopathy     Acute left-sided MCA stroke  Acute kidney injury, likely ATN due to sepsis and hypotension              -Baseline creatinine 0.57      Shock liver due to hypotension     Severe lactic acidosis      Type II non-STEMI     Multiple organ dysfunction syndrome     Severe peripheral vascular disease with recent axillofemoral bypass due to infrarenal aortic occlusion     Hyperkalemia; received treatment in ED already     Diabetes mellitus type 2     Hypertension     Severe mitral insufficiency     Chronic systolic heart failure with EF 25%     Anemia of chronic disease      Plan:  Continue supportive care including vasopressor support  Monitor routine electrolytes  Attempted to reach daughterAna Luisa at [phone] to no avail  High chances of acute respiratory failure requiring intubation  Overall prognosis very poor    Care Plan discussed with: Nurse    Total time spent with patient: 30 minutes.     Elida White MD

## 2021-12-11 NOTE — PROGRESS NOTES
Vancomycin Note-12/11/2021    Admission date 121021   Attending Yoselin Craft   Indication Bacteremia        Height Ht Readings from Last 1 Encounters:   12/10/21 167.6 cm (66\")       Weight Wt Readings from Last 1 Encounters:   12/10/21 77.1 kg (170 lb)      IBW ideal body weight      SCr Creatinine   Date Value Ref Range Status   12/11/2021 3.43 (H) 0.70 - 1.30 mg/dL Final   12/10/2021 2.98 (H) 0.70 - 1.30 mg/dL Final   12/10/2021 2.91 (H) 0.70 - 1.30 mg/dL Final      CrCl (based on IBW) 19.1 ml/min       Load/ER dose 1000mg on 12/10   Current regimen  New start   Level Type / Date / Result random / 12/11 / 15.7   NEW regimen 750mg x 1   Level Scheduled for 12/12 @ 1000         Current Antimicrobial Therapy (168h ago, onward)       Ordered     Start Stop    12/11/21 1028  vancomycin (VANCOCIN) 750 mg in 0.9% sodium chloride 250 mL (VIAL-MATE)  750 mg,   IntraVENous,   ONCE        References:&nbsp;&nbsp;&nbsp;&nbsp; Lexicomp    12/11/21 1100 12/11/21 2259    12/10/21 1653  VANCOMYCIN INFORMATION NOTE  Other,   RX DOSING/MONITORING        References:&nbsp;&nbsp;&nbsp;&nbsp; Lexicomp    12/10/21 1652 --    12/10/21 1635  piperacillin-tazobactam (ZOSYN) 3.375 g in 0.9% sodium chloride (MBP/ADV) 100 mL MBP  3.375 g,   IntraVENous,   EVERY 8 HOURS        Note to Pharmacy: 4 hour extended infusion time. References:&nbsp;&nbsp;&nbsp;&nbsp; Lexicomp    12/10/21 1636 --    12/10/21 1635  levoFLOXacin (LEVAQUIN) 750 mg in D5W IVPB  750 mg,   IntraVENous,   EVERY 48 HOURS        References:&nbsp;&nbsp;&nbsp;&nbsp; Lexicomp    12/10/21 1636 --                      Submitted by:  Temo Potter, JENNIFERD

## 2021-12-11 NOTE — CONSULTS
CARDIOLOGY CONSULTATION      REASON FOR CONSULT: NSTEMI Type     REQUESTING PROVIDER: Dr. Sylvie Anna:  AMS    HISTORY OF PRESENT ILLNESS:  Sergei Menard is a 77y.o. year-old male with past medical history significant for peripheral arterial disease with recent axillofemoral bypass s/t abdominal aortic occlusion, left BKA, cardiomyopathy with EF 25%, diabetes mellitus, hypertension, possibly CABG per imaging who was evaluated today due to NSTEMI likely type 2. Patient was recently at [facility] and underwentaxillofemoral bypass. He was discharged to rehab and presented to the ED with AMS, hypotension, tachypnea. Troponin trending 1040 to 1591 with repeat pending. CT of the head this morning showing an evolving left MCA CVA. He had been initiated on a heparin gtt given NSTEMI but there is significant concern for progression to hemorrhagic CVA given acuity of cerebral ischemic event. EKG showing NSR with RBBB and no acute ischemic changes. He responds to verbal stimuli but is aphasic. YUNIOR on arrival with associated elevated LFTs likely due to shock. Given 1.5L IV fluid bolus with improvement in BP initially but later required Levophed at 3mcgs as well as bicarb gtt at 150mL. Echo from [facility] showing EF 25-30% and moderate to severe mitral valve/mild to moderate aortic regurgitation. Records from hospital admission course thus far reviewed. Telemetry reviewed. Remains in NSR. INPATIENT MEDICATIONS:  Home medications reviewed.     Current Facility-Administered Medications:     LORazepam (ATIVAN) injection 2 mg, 2 mg, IntraVENous, Q4H PRN, Tyrell Olivares MD    sodium chloride (NS) flush 5-10 mL, 5-10 mL, IntraVENous, PRN, Dinorah Montague MD    piperacillin-tazobactam (ZOSYN) 3.375 g in 0.9% sodium chloride (MBP/ADV) 100 mL MBP, 3.375 g, IntraVENous, Q8H, Dinorah Camejo MD, Last Rate: 25 mL/hr at 12/11/21 0923, 3.375 g at 12/11/21 0923   levoFLOXacin (LEVAQUIN) 750 mg in D5W IVPB, 750 mg, IntraVENous, Q48H, Abdoul Camejo MD, IV Completed at 12/10/21 1826    dextrose 5% 1,000 mL with sodium bicarbonate (8.4%) 100 mEq infusion, , IntraVENous, CONTINUOUS, Caroline Lynch NP, Last Rate: 50 mL/hr at 12/11/21 0900, Rate Change at 12/11/21 0900    VANCOMYCIN INFORMATION NOTE, , Other, Rx Dosing/Monitoring, Abdoul Camejo MD    NOREPINephrine (LEVOPHED) 8 mg in 5% dextrose 250mL (32 mcg/mL) infusion, 0.5-16 mcg/min, IntraVENous, TITRATE, Nelli Hermosillo MD, Last Rate: 7.5 mL/hr at 12/10/21 2337, 4 mcg/min at 12/10/21 2337    aspirin (ASA) suppository 300 mg, 300 mg, Rectal, DAILY, Ceci Jerez MD, 300 mg at 12/11/21 7848     ALLERGIES:  Allergies reviewed with the patient,No Known Allergies . FAMILY HISTORY:  Family history reviewed. SOCIAL HISTORY:  Notable for no recent tobacco use, no heavy alcohol or illicit drug use. REVIEW OF SYSTEMS:  Complete review of systems performed, pertinents noted above, all other systems are negative. PHYSICAL EXAMINATION:  Vital sign assessment reveal a blood pressure of  85/60  and pulse rate of 96. Sclerae icteric. Cardiovascular exam has a heart with a regular rate and rhythm, normal S1 and S2. Soft murmur present. There are no rubs or gallops. Good peripheral pulses. No jugular venous distension. No carotid bruits are present. Respiratory exam reveals diminished lung fields. No rales or rhonchi. Gastrointestinal exam has soft, nontender abdomen with normal bowel sounds. Lymphatic exam reveals moderate pedal edema to RLE. Left BKA. Flaccid to right side. Visit Vitals  BP (!) 89/71 (BP 1 Location: Left upper arm, BP Patient Position: At rest)   Pulse 96   Temp 99.9 °F (37.7 °C)   Resp 28   Ht 5' 6\" (1.676 m)   Wt 77.1 kg (170 lb)   SpO2 100%   BMI 27.44 kg/m²         Recent labs results and imaging reviewed.     Discussed case with Dr. Johnie Aviles and our impression and recommendations are as follows:  1. NSTEMI:  Appears to be Type 2 in the setting of shock, acute CVA. Heparin gtt discontinued due to significant concerns of hemorrhagic CVA in the setting of acute cerebral ischemia. Will continue asa suppository as neuro agrees to this as well. EKG with no acute ischemic changes. Will repeat a limited echo to assess IVC, valves, EF, and for pericardial effusion. Will not consider an ischemic evaluation presently given acute CVA. Will trend troponin to peak, however. 2. Cardiomyopathy:  Likely ischemic. Chest x-ray showing sternal wires. Echo with no evidence of previous valve surgery. Sternal wires possibly due to Hx of CABG. Avoid excessive IV hydration given EF of 25-30% with additional moderate to severe MVR. Will decrease bicarb gtt to 50mL/hr. Repeat limited echo pending as stated. Should urine output remain poor could consider transition to or addition of dobutamine gtt if blood pressure improves. 3. Moderate to severe mitral valve regurgitation:  Contributing to clinical presentation. Avoiding excessive IVF. Appreciate recs from renal re: YUNIOR, volume status. 4. Hyperkalemia:  Given insulin and D50 this morning. Repeat BMP to be done at 1100. Thank you for involving us in the care of this patient. Please do not hesitate to call me or Dr. Margaux Macias if additional questions arise.     Brady Davey NP  12/11/2021

## 2021-12-11 NOTE — PROGRESS NOTES
12/10/21 2130 spoke with Dr. Layo Clement regarding elevated Troponin. Orders received. He will see patient in the AM.    12/11/21 [id number] witnessed seizure, twitching of mouth mainly on right side. Patient now has drooping on left side of mouth. Dr. Zeny Michelle notified, STAT Ct of head, and neuro consult ordered. Await CT results prior to starting Heparin. 12/11/21 1675 Dr. Zeny Michelle notified of CT results, orders received. Message left for Dr. Alex Lal with results.

## 2021-12-11 NOTE — PROGRESS NOTES
Nocturnist note  -Cardiology was consulted for elevated troponin this morning and they have ordered heparin drip. RN reported patient had facial twitching and requested CT head prior to starting heparin. CT head obtained now results showing evolving large recent left MCA territory infarct. No intracranial bleed. MRI brain is ordered. Neurology consult.

## 2021-12-12 NOTE — PROGRESS NOTES
Spiritual Care Assessment/Progress Note  [facility]      NAME: Eldon Gaspar MRN: [id number]  AGE: 77 y.o.  SEX: male  Muslim Affiliation: No preference   Language: English     2021     Total Time (in minutes): 79     Spiritual Assessment begun in [facility] CCU through conversation with:         []Patient        [x] Family    [] Friend(s)        Reason for Consult: Death, Inpatient, /memorial planning     Spiritual beliefs: (Please include comment if needed)     [] Identifies with a adebayo tradition:         [] Supported by a adebayo community:            [] Claims no spiritual orientation:           [] Seeking spiritual identity:                [] Adheres to an individual form of spirituality:           [x] Not able to assess:                           Identified resources for coping:      [] Prayer                               [] Music                  [] Guided Imagery     [] Family/friends                 [] Pet visits     [] Devotional reading                         [x] Unknown     [] Other:                                               Interventions offered during this visit: (See comments for more details)          Family/Friend(s): Bridging, Life review/legacy, /memorial planning, Normalization of emotional/spiritual concerns, Catharsis/review of pertinent events in supportive environment     Plan of Care:     [] Support spiritual and/or cultural needs    [] Support AMD and/or advance care planning process      [] Support grieving process   [] Coordinate Rites and/or Rituals    [] Coordination with community clergy   [] No spiritual needs identified at this time   [] Detailed Plan of Care below (See Comments)  [] Make referral to Music Therapy  [] Make referral to Pet Therapy     [] Make referral to Addiction services  [] Make referral to [facility]  [] Make referral to Spiritual Care Partner  [] No future visits requested        [x] Contact Spiritual Care for further referrals     Comments: Talked with family member, Temi Gordon [phone]. Family was asking good questions about planning a  and next steps. We looked up different recourse for the family. They will call supervisor when they have made a decision. Provided inforation on next step, family support and actively listened for needs. Advised nurse to [facility] for any further referrals.     [address], [facility]    Please [facility]  in order to get in touch with  for any Spiritual Care Needs   [phone]   OR   Reach out to us on [facility]

## 2021-12-12 NOTE — PROGRESS NOTES
General Daily Progress Note          Patient Name:   Marga Juan       YOB: 1955       Age:  77 y.o. Admit Date: 12/10/2021      Subjective:     Patient is seen for follow-up. Marga Juan is a 76 yo male with severe peripheral vascular disease, systolic heart failure, diabetes and hypertension. In mid November he was found to have an infrarenal abdominal aortic occlusion and underwent a right axillofemoral bypass procedure. Echo during that stay showed an EF of 25% along with severe mitral insufficiency and moderate aortic insufficiency. He was discharge to a nursing home on 11/18. Patient presented to the ED from [facility] with hypoxia and altered mental status. Pressure was 85/60 on admission with a respiratory rate of 33 and a heart rate of 80. He received 1.5 L    ----  Today, patient in ICU, seen at bedside, opens his eyes but unable to verbalize. He is unable to move his right arm when asked. Left above knee amputation noted on exam.    Glucose stable at 170, WBC remains elevated at 21,900, HGB remains low at 8.2, Urinalysis showed >300 protein, glucose, ketones, urobilinogen, WBC, trace mucus. PTT was elevated at 52.5 yesterday. Sodium is low at 132, potassium remains elevated at 6.1, bicarb improved at 16, anion gap improving at 20, creatinine increasing at 3.43, calcium decreasing at 7.0, elevated total bilirubin at 2.2, low total protein at 5.6, elevated AST at 1,170, elevated ALT at 721, elevated alk phos at 369. Lactic acid was elevated at 12.3 and troponin was elevated at 1,591 yesterday. Following blood and urine cultures. CXR negative for pneumothorax or pleural effusion. CT head showed evolving large recent left MCA infarct, atherosclerosis, no acute intracranial bleed. ECHO on 11/08/2021 showed LVEF 25-30%. Patient seen in follow-up. CT brain showed a large left-sided evolving acute MCA stroke. Patient is completely flaccid on the right side. Systolic blood pressure remains in the 80s and 90s on Levophed    Objective:     Visit Vitals  BP (!) 23/13   Pulse 70   Temp (!) 95.5 °F (35.3 °C)   Resp 22   Ht 5' 6\" (1.676 m)   Wt 170 lb (77.1 kg)   SpO2 (!) 74%   BMI 27.44 kg/m²        Recent Results (from the past 24 hour(s))   GLUCOSE, POC    Collection Time: 12/11/21  9:22 AM   Result Value Ref Range    Glucose (POC) 292 (H) 65 - 117 mg/dL    Performed by [address], BASIC    Collection Time: 12/11/21  1:15 PM   Result Value Ref Range    Sodium 132 (L) 136 - 145 mmol/L    Potassium 6.4 (H) 3.5 - 5.1 mmol/L    Chloride 94 (L) 97 - 108 mmol/L    CO2 15 (LL) 21 - 32 mmol/L    Anion gap 23 (H) 5 - 15 mmol/L    Glucose 298 (H) 65 - 100 mg/dL     (H) 6 - 20 mg/dL    Creatinine 3.93 (H) 0.70 - 1.30 mg/dL    BUN/Creatinine ratio 27 (H) 12 - 20      GFR est AA 19 (L) >60 ml/min/1.73m2    GFR est non-AA 15 (L) >60 ml/min/1.73m2    Calcium 6.8 (L) 8.5 - 10.1 mg/dL   MAGNESIUM    Collection Time: 12/11/21  1:15 PM   Result Value Ref Range    Magnesium 2.3 1.6 - 2.4 mg/dL   TROPONIN-HIGH SENSITIVITY    Collection Time: 12/11/21  1:15 PM   Result Value Ref Range    Troponin-High Sensitivity 4,093 (HH) 0 - 76 ng/L   GLUCOSE, POC    Collection Time: 12/11/21  2:44 PM   Result Value Ref Range    Glucose (POC) 266 (H) 65 - 117 mg/dL    Performed by SHARMILA SALVADOR    ECHO ADULT FOLLOW-UP OR LIMITED    Collection Time: 12/11/21  2:53 PM   Result Value Ref Range    Aortic Regurgitant Pressure Half-time 361.00 ms    AR Max Rich 374.00 cm/s    Ao Root D 2.90 cm    IVSd 1.28 (A) 0.6 - 1.0 cm    LVIDd 5.72 4.2 - 5.9 cm    LVIDs 5.10 cm    LVPWd 1.21 (A) 0.6 - 1.0 cm    LV ED Vol A2C 187.00 cm3    LV ES Vol A2C 133.00 cm3    Left Atrium Major Axis 4.50 cm    Left Atrium Major Axis 4.50 cm    Est. RA Pressure 8.00 mmHg    RVIDd 4.26 cm    RVSP 53.00 mmHg    Tricuspid Valve Max Velocity 334.00 cm/s    Triscuspid Valve Regurgitation Peak Gradient 45.00 mmHg    Right Atrial Area 4C 22.21 cm2    LV Mass .3 88 - 224 g    LV Mass AL Index 163.3 49 - 115 g/m2   EKG, 12 LEAD, INITIAL    Collection Time: 12/11/21  3:56 PM   Result Value Ref Range    Ventricular Rate 92 BPM    Atrial Rate 92 BPM    P-R Interval 164 ms    QRS Duration 154 ms    Q-T Interval 442 ms    QTC Calculation (Bezet) 546 ms    Calculated P Axis 62 degrees    Calculated R Axis -82 degrees    Calculated T Axis 91 degrees    Diagnosis       Normal sinus rhythm  Left axis deviation  Right bundle branch block  Abnormal ECG  When compared with ECG of 10-DEC-2021 14:11, (Unconfirmed)  No significant change was found  Confirmed by Iona Galan MD, [facility] ([id number]) on 12/11/2021 11:46:52 PM     GLUCOSE, POC    Collection Time: 12/11/21  6:14 PM   Result Value Ref Range    Glucose (POC) 290 (H) 65 - 117 mg/dL    Performed by Ml SALVADOR    HGB & HCT    Collection Time: 12/11/21 11:35 PM   Result Value Ref Range    HGB 8.7 (L) 12.1 - 17.0 g/dL    HCT 26.5 (L) 36.6 - 10.8 %   METABOLIC PANEL, BASIC    Collection Time: 12/11/21 11:35 PM   Result Value Ref Range    Sodium 130 (L) 136 - 145 mmol/L    Potassium 6.6 (HH) 3.5 - 5.1 mmol/L    Chloride 92 (L) 97 - 108 mmol/L    CO2 14 (LL) 21 - 32 mmol/L    Anion gap 24 (H) 5 - 15 mmol/L    Glucose 299 (H) 65 - 100 mg/dL     (H) 6 - 20 mg/dL    Creatinine 4.47 (H) 0.70 - 1.30 mg/dL    BUN/Creatinine ratio 25 (H) 12 - 20      GFR est AA 16 (L) >60 ml/min/1.73m2    GFR est non-AA 13 (L) >60 ml/min/1.73m2    Calcium 6.0 (LL) 8.5 - 10.1 mg/dL   GLUCOSE, POC    Collection Time: 12/12/21  1:06 AM   Result Value Ref Range    Glucose (POC) 93 65 - 117 mg/dL    Performed by Tere Portillo, POC    Collection Time: 12/12/21  3:07 AM   Result Value Ref Range    Glucose (POC) 181 (H) 65 - 117 mg/dL    Performed by [facility]    BLOOD GAS, ARTERIAL    Collection Time: 12/12/21  3:15 AM   Result Value Ref Range    pH 7.41 7.35 - 7.45      PCO2 27 (L) 35 - 45 mmHg    PO2 488 (H) 75 - 100 mmHg    O2 SAT >101 >95 %    BICARBONATE 17 (L) 22 - 26 mmol/L    BASE DEFICIT 6.9 (H) 0 - 2 mmol/L    O2 METHOD VENT      FIO2 100.0 %    MODE Assist Control/Volume Control      Tidal volume 420      SET RATE 14      IPAP/PIP 0      EPAP/CPAP/PEEP 6.0      SITE Right Brachial      LEROY'S TEST PASS       [unfilled]      Review of Systems    Constitutional: Negative for chills and fever. HENT: Negative. Eyes: Negative. Respiratory: Negative. Cardiovascular: Negative. Gastrointestinal: Negative for abdominal pain and nausea. Skin: Negative. Neurological: Negative. Physical Exam:      Constitutional: pt is oriented to person, place, and time. HENT:   Head: Normocephalic and atraumatic. Eyes: Pupils are equal, round, and reactive to light. EOM are normal.   Cardiovascular: Normal rate, regular rhythm and normal heart sounds. Pulmonary/Chest: Breath sounds normal. No wheezes. No rales. Exhibits no tenderness. Abdominal: Soft. Bowel sounds are normal. There is no abdominal tenderness. There is no rebound and no guarding. Musculoskeletal: Normal range of motion. Neurological: pt is alert and oriented to person, place, and time. XR CHEST PORT   Final Result      CT HEAD WO CONT   Final Result      Evolving large recent left MCA infarct. No acute intracranial bleed. Atherosclerosis. Follow-up as clinically indicated. CT HEAD WO CONT   Final Result   No change compared to CT head November 7, 2021. Same distribution   left cerebral encephalomalacia.       XR CHEST PORT   Final Result      US RETROPERITONEUM COMP    (Results Pending)        Recent Results (from the past 24 hour(s))   GLUCOSE, POC    Collection Time: 12/11/21  9:22 AM   Result Value Ref Range    Glucose (POC) 292 (H) 65 - 117 mg/dL    Performed by [address], BASIC    Collection Time: 12/11/21  1:15 PM   Result Value Ref Range    Sodium 132 (L) 136 - 145 mmol/L Potassium 6.4 (H) 3.5 - 5.1 mmol/L    Chloride 94 (L) 97 - 108 mmol/L    CO2 15 (LL) 21 - 32 mmol/L    Anion gap 23 (H) 5 - 15 mmol/L    Glucose 298 (H) 65 - 100 mg/dL     (H) 6 - 20 mg/dL    Creatinine 3.93 (H) 0.70 - 1.30 mg/dL    BUN/Creatinine ratio 27 (H) 12 - 20      GFR est AA 19 (L) >60 ml/min/1.73m2    GFR est non-AA 15 (L) >60 ml/min/1.73m2    Calcium 6.8 (L) 8.5 - 10.1 mg/dL   MAGNESIUM    Collection Time: 12/11/21  1:15 PM   Result Value Ref Range    Magnesium 2.3 1.6 - 2.4 mg/dL   TROPONIN-HIGH SENSITIVITY    Collection Time: 12/11/21  1:15 PM   Result Value Ref Range    Troponin-High Sensitivity 4,093 (HH) 0 - 76 ng/L   GLUCOSE, POC    Collection Time: 12/11/21  2:44 PM   Result Value Ref Range    Glucose (POC) 266 (H) 65 - 117 mg/dL    Performed by SHARMILA SALVADOR    ECHO ADULT FOLLOW-UP OR LIMITED    Collection Time: 12/11/21  2:53 PM   Result Value Ref Range    Aortic Regurgitant Pressure Half-time 361.00 ms    AR Max Rich 374.00 cm/s    Ao Root D 2.90 cm    IVSd 1.28 (A) 0.6 - 1.0 cm    LVIDd 5.72 4.2 - 5.9 cm    LVIDs 5.10 cm    LVPWd 1.21 (A) 0.6 - 1.0 cm    LV ED Vol A2C 187.00 cm3    LV ES Vol A2C 133.00 cm3    Left Atrium Major Axis 4.50 cm    Left Atrium Major Axis 4.50 cm    Est. RA Pressure 8.00 mmHg    RVIDd 4.26 cm    RVSP 53.00 mmHg    Tricuspid Valve Max Velocity 334.00 cm/s    Triscuspid Valve Regurgitation Peak Gradient 45.00 mmHg    Right Atrial Area 4C 22.21 cm2    LV Mass .3 88 - 224 g    LV Mass AL Index 163.3 49 - 115 g/m2   EKG, 12 LEAD, INITIAL    Collection Time: 12/11/21  3:56 PM   Result Value Ref Range    Ventricular Rate 92 BPM    Atrial Rate 92 BPM    P-R Interval 164 ms    QRS Duration 154 ms    Q-T Interval 442 ms    QTC Calculation (Bezet) 546 ms    Calculated P Axis 62 degrees    Calculated R Axis -82 degrees    Calculated T Axis 91 degrees    Diagnosis       Normal sinus rhythm  Left axis deviation  Right bundle branch block  Abnormal ECG  When compared with ECG of 10-DEC-2021 14:11, (Unconfirmed)  No significant change was found  Confirmed by Prosper Gill MD, [facility] ([id number]) on 12/11/2021 11:46:52 PM     GLUCOSE, POC    Collection Time: 12/11/21  6:14 PM   Result Value Ref Range    Glucose (POC) 290 (H) 65 - 117 mg/dL    Performed by Dominik Lindsey    HGB & HCT    Collection Time: 12/11/21 11:35 PM   Result Value Ref Range    HGB 8.7 (L) 12.1 - 17.0 g/dL    HCT 26.5 (L) 36.6 - 45.9 %   METABOLIC PANEL, BASIC    Collection Time: 12/11/21 11:35 PM   Result Value Ref Range    Sodium 130 (L) 136 - 145 mmol/L    Potassium 6.6 (HH) 3.5 - 5.1 mmol/L    Chloride 92 (L) 97 - 108 mmol/L    CO2 14 (LL) 21 - 32 mmol/L    Anion gap 24 (H) 5 - 15 mmol/L    Glucose 299 (H) 65 - 100 mg/dL     (H) 6 - 20 mg/dL    Creatinine 4.47 (H) 0.70 - 1.30 mg/dL    BUN/Creatinine ratio 25 (H) 12 - 20      GFR est AA 16 (L) >60 ml/min/1.73m2    GFR est non-AA 13 (L) >60 ml/min/1.73m2    Calcium 6.0 (LL) 8.5 - 10.1 mg/dL   GLUCOSE, POC    Collection Time: 12/12/21  1:06 AM   Result Value Ref Range    Glucose (POC) 93 65 - 117 mg/dL    Performed by JEANIE GONZALEZ    GLUCOSE, POC    Collection Time: 12/12/21  3:07 AM   Result Value Ref Range    Glucose (POC) 181 (H) 65 - 117 mg/dL    Performed by [facility]    BLOOD GAS, ARTERIAL    Collection Time: 12/12/21  3:15 AM   Result Value Ref Range    pH 7.41 7.35 - 7.45      PCO2 27 (L) 35 - 45 mmHg    PO2 488 (H) 75 - 100 mmHg    O2 SAT >101 >95 %    BICARBONATE 17 (L) 22 - 26 mmol/L    BASE DEFICIT 6.9 (H) 0 - 2 mmol/L    O2 METHOD VENT      FIO2 100.0 %    MODE Assist Control/Volume Control      Tidal volume 420      SET RATE 14      IPAP/PIP 0      EPAP/CPAP/PEEP 6.0      SITE Right Brachial      LEROY'S TEST PASS         Results     Procedure Component Value Units Date/Time    MRSA SCREEN - PCR (NASAL) [[id number]] Collected: 12/10/21 2000    Order Status: Completed Specimen: Swab Updated: 12/11/21 0343     MRSA by PCR, Nasal Not Detected       CULTURE, BLOOD, PAIRED [[id number]] Collected: 12/10/21 1426    Order Status: Completed Specimen: Blood Updated: 12/11/21 1537     Special Requests: No Special Requests        Culture result: No growth after 4 hours       COVID-19 RAPID TEST [[id number]] Collected: 12/10/21 1426    Order Status: Completed Specimen: Nasopharyngeal Updated: 12/10/21 1533     Specimen source Nasopharyngeal        COVID-19 rapid test Not Detected        Comment: Rapid Abbott ID Now   Rapid NAAT:  The specimen is NEGATIVE for SARS-CoV-2, the novel coronavirus associated with COVID-19. Negative results should be treated as presumptive and, if inconsistent with clinical signs and symptoms or necessary for patient management, should be tested with an alternative molecular assay. Negative results do not preclude SARS-CoV-2 infection and should not be used as the sole basis for patient management decisions. This test has been authorized by the FDA under   an Emergency Use Authorization (EUA) for use by authorized laboratories. Fact sheet for Healthcare Providers: ConventionTurnStardate.co.nz Fact sheet for Patients: [employer]TurnStardate.co.nz   Methodology: Isothermal Nucleic Acid Amplification         CULTURE, URINE [[id number]] Collected: 12/10/21 1426    Order Status: Completed Specimen: Urine Updated: 12/12/21 0744     Special Requests: --        No Special Requests  Reflexed from [id number]       Culture result:       No significant growth, <10,000 CFU/mL                 Labs:     Recent Labs     12/11/21  2335 12/11/21  0250 12/10/21  2200 12/10/21  2200   WBC  --  21.3*  --  22.4*   HGB 8.7* 8.2*   < > 8.2*   HCT 26.5* 24.5*   < > 25.6*   PLT  --  113*  --  113*    < > = values in this interval not displayed.      Recent Labs     12/11/21  2335 12/11/21  1315 12/11/21  0250   * 132* 132*   K 6.6* 6.4* 6.1*   CL 92* 94* 96*   CO2 14* 15* 16*   * 107* 100*   CREA 4.47* 3.93* 3.43*   * 298* 245*   CA 6. 0* 6.8* 7.0*   MG  --  2.3 2.3   PHOS  --   --  5.9*     Recent Labs     12/11/21  0250 12/10/21  1426   * 971*   * 497*   TBILI 2.2* 2.4*   TP 5.6* 7.2   ALB 1.9*  1.9* 2.6*   GLOB 3.7 4.6*     Recent Labs     12/10/21  2200   APTT 52.5*      No results for input(s): FE, TIBC, PSAT, FERR in the last 72 hours. No results found for: FOL, RBCF   Recent Labs     12/12/21  0315 12/11/21  0640   PH 7.41 7.49*   PCO2 27* 17*   PO2 488* 138*     No results for input(s): CPK, CKNDX, TROIQ in the last 72 hours.     No lab exists for component: CPKMB  Lab Results   Component Value Date/Time    Cholesterol, total 165 11/08/2021 02:55 AM    HDL Cholesterol 37 11/08/2021 02:55 AM    LDL, calculated 110.8 (H) 11/08/2021 02:55 AM    Triglyceride 86 11/08/2021 02:55 AM    CHOL/HDL Ratio 4.5 11/08/2021 02:55 AM     Lab Results   Component Value Date/Time    Glucose (POC) 181 (H) 12/12/2021 03:07 AM    Glucose (POC) 93 12/12/2021 01:06 AM    Glucose (POC) 290 (H) 12/11/2021 06:14 PM    Glucose (POC) 266 (H) 12/11/2021 02:44 PM    Glucose (POC) 292 (H) 12/11/2021 09:22 AM     Lab Results   Component Value Date/Time    Color Dark Yellow 12/10/2021 02:26 PM    Appearance Turbid (A) 12/10/2021 02:26 PM    Specific gravity 1.018 12/10/2021 02:26 PM    pH (UA) 5.0 12/10/2021 02:26 PM    Protein >300 (A) 12/10/2021 02:26 PM    Glucose 50 (A) 12/10/2021 02:26 PM    Ketone 5 (A) 12/10/2021 02:26 PM    Bilirubin Negative 12/10/2021 02:26 PM    Urobilinogen 2.0 (H) 12/10/2021 02:26 PM    Nitrites Negative 12/10/2021 02:26 PM    Leukocyte Esterase Negative 12/10/2021 02:26 PM    Bacteria Negative 12/10/2021 02:26 PM    WBC 20-50 12/10/2021 02:26 PM    RBC 5-10 12/10/2021 02:26 PM         Assessment:    Severe sepsis with septic shock, possibly due to UTI     Septic and metabolic encephalopathy     Acute kidney injury, likely ischemic ATN due to sepsis and hypotension  - Baseline creatinine 0.57      Shock liver due to hypotension     Severe lactic acidosis, 12.3     Type II non-STEMI     Multiple organ dysfunction syndrome     Severe peripheral vascular disease with recent axillofemoral bypass due to infrarenal aortic occlusion     Hyperkalemia; received treatment in ED already    Diabetes mellitus type 2     Hypertension     Severe mitral insufficiency    Chronic systolic heart failure with EF 25%    Anemia of chronic disease      Plan:    Continue supportive care including vasopressor support  Monitor routine electrolytes  Attempted to reach daughterSloane at [phone] to no avail  High chances of acute respiratory failure requiring intubation  Overall prognosis very poor     Home medications were reviewed      Current Facility-Administered Medications:     propofoL (DIPRIVAN) 10 mg/mL injection, , , ,     EPINEPHrine (ADRENALIN) 0.1 mg/mL 0.1 mg/mL syringe, , , ,     morphine injection 2 mg, 2 mg, IntraVENous, Q4H PRN, Tyrell Olivares MD, 2 mg at 12/12/21 0410    hydrocortisone Sod Succ (PF) (SOLU-CORTEF) injection 100 mg, 100 mg, IntraVENous, Q8H, Tyrell Olivares MD, 100 mg at 12/12/21 0244    PHENYLephrine (PHI-SYNEPHRINE) 30 mg in 0.9% sodium chloride 250 mL infusion,  mcg/min, IntraVENous, TITRATE, Tyrell Olivares MD, Last Rate: 150 mL/hr at 12/12/21 0653, 300 mcg/min at 12/12/21 0653    NOREPINephrine (LEVOPHED) 8 mg in 5% dextrose 250mL (32 mcg/mL) infusion, 0.5-30 mcg/min, IntraVENous, TITRATE, Tyrell Olivares MD, Last Rate: 225 mL/hr at 12/12/21 0605, 120 mcg/min at 12/12/21 0605    EPINEPHrine (ADRENALIN) 10 mg in 0.9% sodium chloride 250 mL infusion, 0-10 mcg/min, IntraVENous, TITRATE, Tyrell Olivares MD    LORazepam (ATIVAN) injection 2 mg, 2 mg, IntraVENous, Q4H PRN, Tyrell Olivares MD    sodium bicarbonate (8.4%) 150 mEq in dextrose 5% 1,000 mL infusion, , IntraVENous, CONTINUOUS, Noe JESUS MD, Last Rate: 100 mL/hr at 12/11/21 1915, Rate Verify at 12/11/21 1915    sodium chloride (NS) flush 5-10 mL, 5-10 mL, IntraVENous, PRN, Aisha Rojas MD    piperacillin-tazobactam (ZOSYN) 3.375 g in 0.9% sodium chloride (MBP/ADV) 100 mL MBP, 3.375 g, IntraVENous, Q8H, Aisha Camejo MD, Last Rate: 25 mL/hr at 12/11/21 1915, Rate Verify at 12/11/21 1915    levoFLOXacin (LEVAQUIN) 750 mg in D5W IVPB, 750 mg, IntraVENous, Q48H, Aisha Camejo MD, IV Completed at 12/10/21 1826    VANCOMYCIN INFORMATION NOTE, , Other, Rx Dosing/Monitoring, Aisha Camejo MD    aspirin (ASA) suppository 300 mg, 300 mg, Rectal, DAILY, Jodi Gitelman, MD, 300 mg at 12/11/21 9629

## 2021-12-12 NOTE — PROGRESS NOTES
During bedside shift change report pt declined further. At 0735 NO BP reading or end tidal CO2 could be obtained upon further assessment no heart sounds could be auscultated and no pulses could be palpated, no response to verbal or tactile stimuli, pupils fixed and dilated. Pt code status DNR. Dr. Karla Byrne called to pt bedside. Patients time of death was pronounced by Dr. Karla Byrne at 0654. Pt's daughter Kristan Spears [phone] and primary nurse Endy Vaughn RN) present at bedside at [facility]. Spoke with Rhianna Jeff at [phone] [facility]. Pt is NOT a candidate for donation and the body can be released. Per pt daughter and paperwork from the transferring facility pt first name is Jose Art (Earlinekilo Kevin). Primary nurse called registration to have name corrected. Death form completed with daughter Jacky. No personal belongings found to be with pt. Post mortem care has been provided and waiting on transportation to transport patient to the [facility].

## 2021-12-12 NOTE — PROGRESS NOTES
Nocturnist cross cover note    Patient in the hospital with acute stroke, acute renal failure, septic shock. Called at the bedside to evaluate patient as he has been doing well. He is in acute distress, gasping breathing noted. Patient is full code. Decision made to intubate patient. He is severely acidotic, on bicarb drip, additional boluses of bicarb given. On Levophed still hypotensive, Chris-Synephrine added. Hydrocortisone IV added. Treatment given for hyperkalemia. IV calcium gluconate, magnesium sulfate given. Contacted family and spoke with his children. Son Estella MURCIAGGELAN([phone]) and daughter Danie Pan ([phone])  Daughter and her aunt have arrived to the ICU. Support was provided to family. Family is understandably in a lot of shock. They did not know patient was in the hospital.  Initially daughter wanted to continue with full code status later she has stated she does not want her father to suffer. Daughter instructs to change CODE STATUS to DNR. Continues to be intubated for the time being. Prognosis is grim. Discussed possibility of CRRT/dialysis later today as per nephrology note. Daughter instructs against dialysis.

## 2021-12-12 NOTE — DISCHARGE SUMMARY
Physician Discharge Summary     Patient ID:    Ghazala Murillo  [id number]  77 y.o.  1955    Admit date: 12/10/2021    Discharge date : 2021    Chronic Diagnoses:    Problem List as of 2021 Date Reviewed: 2021          Codes Class Noted - Resolved    Septic shock ([facility].) ICD-10-CM: A41.9, R65.21  ICD-9-CM: 038.9, 785.52, 995.92  12/10/2021 - Present        PAD (peripheral artery disease) ([facility].) ICD-10-CM: I73.9  ICD-9-CM: 443.9  2021 - Present          22    Final Diagnoses:   Septic shock ([facility].) [A41.9, R65.21]  Multiorgan failure  Acute kidney injury  Acute left MCA infarct  Non-ST elevation myocardial infarction    Reason for Hospitalization:    Alteration in mentation    Hospital Course:   77 y. o. male with severe peripheral vascular disease, systolic heart failure, diabetes and hypertension. Suzie Mariano was admitted to [facility] in mid November and found to have an infrarenal abdominal aortic occlusion and ended up undergoing a right axillofemoral bypass procedure.  Echo during that stay showed an EF of 25% along with severe mitral insufficiency and moderate aortic insufficiency.  He was discharged to a nursing home on      Patient presents to the ED today from [facility] with hypoxia and altered mental status.   Pressure was 85/60 on admission with a respiratory rate of 33 and a heart rate of 80. CT brain showed a large left-sided evolving acute MCA stroke. Patient is completely flaccid on the right side. Systolic blood pressure remains in the 80s and 90s on Levophed. Overall condition deteriorated during ICU stay and family made him a DNR.  on the morning of  at 8:02 AM. Daughter at bedside                 Discharge Medications:   Current Discharge Medication List            Follow up Care:    1. None in 1-2 weeks. Please call to set up an appointment shortly after discharge.       Diet:     Disposition:  regan. Advanced Directive:   FULL    DNR x     Discharge Exam:  Visit Vitals  BP (!) 39/17 (BP 1 Location: Right leg, BP Patient Position: At rest)   Pulse 65   Temp (!) 95.5 °F (35.3 °C)   Resp 12   Ht 5' 6\" (1.676 m)   Wt 77.1 kg (170 lb)   SpO2 (!) 63%   BMI 27.44 kg/m²    O2 Flow Rate (L/min): 6 l/min O2 Device: Nasal cannula    Temp (24hrs), Av.3 °F (36.8 °C), Min:95.5 °F (35.3 °C), Max:99.7 °F (37.6 °C)    No intake/output data recorded. 12/10 1901 -  0700  In: 0572 [I.V.:1663]  Out: 0     General:  unresponsive   Lungs:   No breath sounds   Chest wall:  No tenderness or deformity. Heart:  No heart sounds   Abdomen:   No bowel sounds               Neurologic: unresponsive         CONSULTATIONS: Pulmonary/Intensive care    Significant Diagnostic Studies:   12/10/2021: BUN 90 mg/dL (H; Ref range: 6 - 20 mg/dL); BUN 92 mg/dL (H; Ref range: 6 - 20 mg/dL); BUN 93 mg/dL (H; Ref range: 6 - 20 mg/dL); Calcium 8.6 mg/dL (Ref range: 8.5 - 10.1 mg/dL); Calcium 8.2 mg/dL (L; Ref range: 8.5 - 10.1 mg/dL); Calcium 7.1 mg/dL (L; Ref range: 8.5 - 10.1 mg/dL); CO2 10 mmol/L (LL; Ref range: 21 - 32 mmol/L); CO2 10 mmol/L (LL; Ref range: 21 - 32 mmol/L); CO2 12 mmol/L (LL; Ref range: 21 - 32 mmol/L); Creatinine 2.94 mg/dL (H; Ref range: 0.70 - 1.30 mg/dL); Creatinine 2.91 mg/dL (H; Ref range: 0.70 - 1.30 mg/dL); Creatinine 2.98 mg/dL (H; Ref range: 0.70 - 1.30 mg/dL); Glucose 164 mg/dL (H; Ref range: 65 - 100 mg/dL); Glucose 144 mg/dL (H; Ref range: 65 - 100 mg/dL); Glucose 148 mg/dL (H; Ref range: 65 - 100 mg/dL); HCT 29.3 % (L; Ref range: 36.6 - 50.3 %); HCT 25.6 % (L; Ref range: 36.6 - 50.3 %); HGB 9.4 g/dL (L; Ref range: 12.1 - 17.0 g/dL); HGB 8.2 g/dL (L; Ref range: 12.1 - 17.0 g/dL); Potassium 6.4 mmol/L (H; Ref range: 3.5 - 5.1 mmol/L); Potassium 6.6 mmol/L (HH; Ref range: 3.5 - 5.1 mmol/L); Potassium 5.7 mmol/L (H; Ref range: 3.5 - 5.1 mmol/L);  Sodium 131 mmol/L (L; Ref range: 136 - 145 mmol/L); Sodium 132 mmol/L (L; Ref range: 136 - 145 mmol/L); Sodium 134 mmol/L (L; Ref range: 136 - 145 mmol/L)  12/11/2021:  mg/dL (H; Ref range: 6 - 20 mg/dL);  mg/dL (H; Ref range: 6 - 20 mg/dL);  mg/dL (H; Ref range: 6 - 20 mg/dL); Calcium 7.0 mg/dL (L; Ref range: 8.5 - 10.1 mg/dL); Calcium 6.8 mg/dL (L; Ref range: 8.5 - 10.1 mg/dL); Calcium 6.0 mg/dL (LL; Ref range: 8.5 - 10.1 mg/dL); CO2 16 mmol/L (L; Ref range: 21 - 32 mmol/L); CO2 15 mmol/L (LL; Ref range: 21 - 32 mmol/L); CO2 14 mmol/L (LL; Ref range: 21 - 32 mmol/L); Creatinine 3.43 mg/dL (H; Ref range: 0.70 - 1.30 mg/dL); Creatinine 3.93 mg/dL (H; Ref range: 0.70 - 1.30 mg/dL); Creatinine 4.47 mg/dL (H; Ref range: 0.70 - 1.30 mg/dL); Glucose 245 mg/dL (H; Ref range: 65 - 100 mg/dL); Glucose 298 mg/dL (H; Ref range: 65 - 100 mg/dL); Glucose 299 mg/dL (H; Ref range: 65 - 100 mg/dL); HCT 24.5 % (L; Ref range: 36.6 - 50.3 %); HCT 26.5 % (L; Ref range: 36.6 - 50.3 %); HGB 8.2 g/dL (L; Ref range: 12.1 - 17.0 g/dL); HGB 8.7 g/dL (L; Ref range: 12.1 - 17.0 g/dL); Potassium 6.1 mmol/L (H; Ref range: 3.5 - 5.1 mmol/L); Potassium 6.4 mmol/L (H; Ref range: 3.5 - 5.1 mmol/L); Potassium 6.6 mmol/L (HH; Ref range: 3.5 - 5.1 mmol/L); Sodium 132 mmol/L (L; Ref range: 136 - 145 mmol/L); Sodium 132 mmol/L (L; Ref range: 136 - 145 mmol/L); Sodium 130 mmol/L (L; Ref range: 136 - 145 mmol/L)  Recent Labs     12/11/21  2335 12/11/21  0250 12/10/21  2200 12/10/21  2200   WBC  --  21.3*  --  22.4*   HGB 8.7* 8.2*   < > 8.2*   HCT 26.5* 24.5*   < > 25.6*   PLT  --  113*  --  113*    < > = values in this interval not displayed.      Recent Labs     12/11/21 2335 12/11/21  1315 12/11/21 0250   * 132* 132*   K 6.6* 6.4* 6.1*   CL 92* 94* 96*   CO2 14* 15* 16*   * 107* 100*   CREA 4.47* 3.93* 3.43*   * 298* 245*   CA 6.0* 6.8* 7.0*   MG  --  2.3 2.3   PHOS  --   --  5.9*     Recent Labs     12/11/21  0250 12/10/21  1426   * 971*   * 497*   TBILI 2.2* 2.4*   TP 5.6* 7.2   ALB 1.9*  1.9* 2.6*   GLOB 3.7 4.6*     Recent Labs     12/10/21  2200   APTT 52.5*      No results for input(s): FE, TIBC, PSAT, FERR in the last 72 hours. Recent Labs     12/12/21  0315 12/11/21  0640   PH 7.41 7.49*   PCO2 27* 17*   PO2 488* 138*     No results for input(s): CPK, CKMB in the last 72 hours.     No lab exists for component: TROPONINI  Lab Results   Component Value Date/Time    Glucose (POC) 181 (H) 12/12/2021 03:07 AM    Glucose (POC) 93 12/12/2021 01:06 AM    Glucose (POC) 290 (H) 12/11/2021 06:14 PM    Glucose (POC) 266 (H) 12/11/2021 02:44 PM    Glucose (POC) 292 (H) 12/11/2021 09:22 AM       Total Time: 35 minutes    Signed:  Aylin Gibson MD  12/12/2021  9:53 AM

## 2021-12-13 LAB
ATRIAL RATE: 74 BPM
ATRIAL RATE: 79 BPM
BACTERIA SPEC CULT: NORMAL
BACTERIA SPEC CULT: NORMAL
CALCULATED P AXIS, ECG09: 68 DEGREES
CALCULATED P AXIS, ECG09: 68 DEGREES
CALCULATED R AXIS, ECG10: -84 DEGREES
CALCULATED R AXIS, ECG10: -85 DEGREES
CALCULATED T AXIS, ECG11: 71 DEGREES
CALCULATED T AXIS, ECG11: 84 DEGREES
DIAGNOSIS, 93000: NORMAL
DIAGNOSIS, 93000: NORMAL
P-R INTERVAL, ECG05: 162 MS
P-R INTERVAL, ECG05: 172 MS
Q-T INTERVAL, ECG07: 474 MS
Q-T INTERVAL, ECG07: 510 MS
QRS DURATION, ECG06: 150 MS
QRS DURATION, ECG06: 156 MS
QTC CALCULATION (BEZET), ECG08: 543 MS
QTC CALCULATION (BEZET), ECG08: 566 MS
SPECIAL REQUESTS,SREQ: NORMAL
VENTRICULAR RATE, ECG03: 74 BPM
VENTRICULAR RATE, ECG03: 79 BPM
